# Patient Record
Sex: MALE | Race: WHITE | URBAN - METROPOLITAN AREA
[De-identification: names, ages, dates, MRNs, and addresses within clinical notes are randomized per-mention and may not be internally consistent; named-entity substitution may affect disease eponyms.]

---

## 2023-01-09 ENCOUNTER — OFFICE VISIT (OUTPATIENT)
Dept: FAMILY MEDICINE CLINIC | Age: 63
End: 2023-01-09
Payer: MEDICAID

## 2023-01-09 VITALS
SYSTOLIC BLOOD PRESSURE: 130 MMHG | RESPIRATION RATE: 17 BRPM | OXYGEN SATURATION: 97 % | HEART RATE: 62 BPM | HEIGHT: 72 IN | BODY MASS INDEX: 41.66 KG/M2 | DIASTOLIC BLOOD PRESSURE: 78 MMHG | WEIGHT: 307.6 LBS | TEMPERATURE: 98.2 F

## 2023-01-09 DIAGNOSIS — I48.0 PAF (PAROXYSMAL ATRIAL FIBRILLATION) (HCC): ICD-10-CM

## 2023-01-09 DIAGNOSIS — F51.01 PRIMARY INSOMNIA: ICD-10-CM

## 2023-01-09 DIAGNOSIS — Z76.89 ENCOUNTER TO ESTABLISH CARE: Primary | ICD-10-CM

## 2023-01-09 DIAGNOSIS — L97.509 TYPE 2 DIABETES MELLITUS WITH FOOT ULCER, WITHOUT LONG-TERM CURRENT USE OF INSULIN (HCC): ICD-10-CM

## 2023-01-09 DIAGNOSIS — I10 PRIMARY HYPERTENSION: ICD-10-CM

## 2023-01-09 DIAGNOSIS — E78.2 MIXED HYPERLIPIDEMIA: ICD-10-CM

## 2023-01-09 DIAGNOSIS — R26.9 ABNORMALITY OF GAIT AND MOBILITY: ICD-10-CM

## 2023-01-09 DIAGNOSIS — G62.9 NEUROPATHY: ICD-10-CM

## 2023-01-09 DIAGNOSIS — I50.33 ACUTE ON CHRONIC DIASTOLIC CONGESTIVE HEART FAILURE (HCC): ICD-10-CM

## 2023-01-09 DIAGNOSIS — E11.621 TYPE 2 DIABETES MELLITUS WITH FOOT ULCER, WITHOUT LONG-TERM CURRENT USE OF INSULIN (HCC): ICD-10-CM

## 2023-01-09 DIAGNOSIS — Z12.5 PROSTATE CANCER SCREENING: ICD-10-CM

## 2023-01-09 DIAGNOSIS — Z11.59 ENCOUNTER FOR HEPATITIS C SCREENING TEST FOR LOW RISK PATIENT: ICD-10-CM

## 2023-01-09 DIAGNOSIS — F10.10 ALCOHOL ABUSE: ICD-10-CM

## 2023-01-09 LAB — HBA1C MFR BLD HPLC: 7.5 %

## 2023-01-09 PROCEDURE — 99205 OFFICE O/P NEW HI 60 MIN: CPT

## 2023-01-09 PROCEDURE — 36415 COLL VENOUS BLD VENIPUNCTURE: CPT

## 2023-01-09 PROCEDURE — 3051F HG A1C>EQUAL 7.0%<8.0%: CPT

## 2023-01-09 PROCEDURE — 3078F DIAST BP <80 MM HG: CPT

## 2023-01-09 PROCEDURE — 83036 HEMOGLOBIN GLYCOSYLATED A1C: CPT

## 2023-01-09 PROCEDURE — 3075F SYST BP GE 130 - 139MM HG: CPT

## 2023-01-09 RX ORDER — PANTOPRAZOLE SODIUM 40 MG/1
40 TABLET, DELAYED RELEASE ORAL DAILY
COMMUNITY

## 2023-01-09 RX ORDER — ATORVASTATIN CALCIUM 40 MG/1
40 TABLET, FILM COATED ORAL DAILY
Qty: 90 TABLET | Refills: 0 | Status: SHIPPED | OUTPATIENT
Start: 2023-01-09

## 2023-01-09 RX ORDER — GABAPENTIN 300 MG/1
300 CAPSULE ORAL 3 TIMES DAILY
COMMUNITY
End: 2023-01-09 | Stop reason: SDUPTHER

## 2023-01-09 RX ORDER — GUAIFENESIN 600 MG/1
600 TABLET, EXTENDED RELEASE ORAL 2 TIMES DAILY
COMMUNITY

## 2023-01-09 RX ORDER — GABAPENTIN 300 MG/1
600 CAPSULE ORAL 3 TIMES DAILY
Qty: 540 CAPSULE | Refills: 0 | Status: SHIPPED | OUTPATIENT
Start: 2023-01-09

## 2023-01-09 RX ORDER — AMIODARONE HYDROCHLORIDE 200 MG/1
TABLET ORAL
COMMUNITY
End: 2023-01-09 | Stop reason: SDUPTHER

## 2023-01-09 RX ORDER — LORATADINE 10 MG/1
10 TABLET ORAL
COMMUNITY

## 2023-01-09 RX ORDER — LANOLIN ALCOHOL/MO/W.PET/CERES
CREAM (GRAM) TOPICAL DAILY
COMMUNITY

## 2023-01-09 RX ORDER — FLUTICASONE FUROATE AND VILANTEROL 200; 25 UG/1; UG/1
1 POWDER RESPIRATORY (INHALATION) DAILY
COMMUNITY

## 2023-01-09 RX ORDER — BENZONATATE 100 MG/1
100 CAPSULE ORAL
COMMUNITY

## 2023-01-09 RX ORDER — FOLIC ACID 1 MG/1
1 TABLET ORAL DAILY
Qty: 90 TABLET | Refills: 0 | Status: SHIPPED | OUTPATIENT
Start: 2023-01-09

## 2023-01-09 RX ORDER — FUROSEMIDE 40 MG/1
TABLET ORAL DAILY
COMMUNITY
End: 2023-01-09 | Stop reason: SDUPTHER

## 2023-01-09 RX ORDER — FOLIC ACID 1 MG/1
TABLET ORAL DAILY
COMMUNITY
End: 2023-01-09 | Stop reason: SDUPTHER

## 2023-01-09 RX ORDER — FUROSEMIDE 40 MG/1
40 TABLET ORAL DAILY
Qty: 90 TABLET | Refills: 0 | Status: SHIPPED | OUTPATIENT
Start: 2023-01-09

## 2023-01-09 RX ORDER — TRAZODONE HYDROCHLORIDE 50 MG/1
50 TABLET ORAL
Qty: 90 TABLET | Refills: 0 | Status: SHIPPED | OUTPATIENT
Start: 2023-01-09

## 2023-01-09 RX ORDER — LANOLIN ALCOHOL/MO/W.PET/CERES
400 CREAM (GRAM) TOPICAL DAILY
COMMUNITY

## 2023-01-09 RX ORDER — SACUBITRIL AND VALSARTAN 24; 26 MG/1; MG/1
1 TABLET, FILM COATED ORAL 2 TIMES DAILY
COMMUNITY

## 2023-01-09 RX ORDER — ATORVASTATIN CALCIUM 40 MG/1
TABLET, FILM COATED ORAL DAILY
COMMUNITY
End: 2023-01-09 | Stop reason: SDUPTHER

## 2023-01-09 RX ORDER — AMIODARONE HYDROCHLORIDE 200 MG/1
200 TABLET ORAL DAILY
Qty: 90 TABLET | Refills: 0 | Status: SHIPPED | OUTPATIENT
Start: 2023-01-09

## 2023-01-09 NOTE — LETTER
1/9/2023 4:57 PM    Mr. Sylvia Jones [address] [place] [address]      RE:  Nicky Barron   1960      Dear Mandy Short is a patient of [facility] 50 with Minh Kumar NP. Please fax this patient's most recent H&P, labs, discharge summary, consult notes so that we may update the patient's records for continuity of care. Our office number is [phone] if you should have any additional questions.      Our fax number: [phone]                    Sincerely,      Diana Green NP

## 2023-01-09 NOTE — PROGRESS NOTES
1. \"Have you been to the ER, urgent care clinic since your last visit? Hospitalized since your last visit? \" Yes Where: [facility]    2. \"Have you seen or consulted any other health care providers outside of the [address] [place] [address] since your last visit? \" Yes Where: [facility]      3. For patients aged 39-70: Has the patient had a colonoscopy / FIT/ Cologuard? No      If the patient is female:    4. For patients aged 41-77: Has the patient had a mammogram within the past 2 years? No      5. For patients aged 21-65: Has the patient had a pap smear?  NA - based on age or sex

## 2023-01-09 NOTE — PROGRESS NOTES
Chief Complaint   Patient presents with    [facility]       HPI:     is a 57 y.o. male who presents to establish care. He moved to this area to live with his son following hospitalization at [facility] in [place], and then later [address] [place] [address] in [place]. PAF:  Rate controlled on amiodarone, on Eliquis; denies palpitations. HF:  Acute on chronic diastolic HF; TTE 81/3237 with EF 55-60% and severe aortic stenosis. Is on 2 liter fluid restriction, daily weights and monitoring per [address] [place] [address] cardiology. Has appt in 2 weeks for cardiac cath; he was told that he may need TAVR. HLD:  Compliant with atorvastatin; per records, recent LDL 36. History of bilateral ICA stenosis S/P left endarterectomy. Hyperglycemia:  Requiring insulin during recent admission, but no diagnosis of DM. A1C 5.3%. GERD:  EGD 11/2022 showing non-bleeding gastric ulcers; remains on daily pantoprazole. Neuropathy issues:  Longtime difficulty with ambulation due to idiopathic neuropathy; seems worse over the last several months. Legs are very painful, especially at night; has some difficulty falling asleep which is exacerbated by this pain. Has been taking extra of his gabapentin to help with pain at bedtime. No Known Allergies    Current Outpatient Medications   Medication Sig    benzonatate (TESSALON) 100 mg capsule Take 100 mg by mouth three (3) times daily as needed for Cough. fluticasone furoate-vilanteroL (BREO ELLIPTA) 200-25 mcg/dose inhaler Take 1 Puff by inhalation daily. guaiFENesin ER (Mucinex) 600 mg ER tablet Take 600 mg by mouth two (2) times a day. loratadine (CLARITIN) 10 mg tablet Take 10 mg by mouth.    magnesium oxide (MAG-OX) 400 mg tablet Take 400 mg by mouth daily. pantoprazole (PROTONIX) 40 mg tablet Take 40 mg by mouth daily. thiamine HCL (B-1) 100 mg tablet Take  by mouth daily. sacubitriL-valsartan (Entresto) 24-26 mg tablet Take 1 Tablet by mouth two (2) times a day. thiamine HCL (Vitamin B-1) 100 mg tablet Take  by mouth daily. gabapentin (NEURONTIN) 300 mg capsule Take 2 Capsules by mouth three (3) times daily. Max Daily Amount: 1,800 mg.    amiodarone (CORDARONE) 200 mg tablet Take 1 Tablet by mouth daily. apixaban (Eliquis) 5 mg tablet Take 1 Tablet by mouth two (2) times a day. folic acid (FOLVITE) 1 mg tablet Take 1 Tablet by mouth daily. atorvastatin (LIPITOR) 40 mg tablet Take 1 Tablet by mouth daily. furosemide (LASIX) 40 mg tablet Take 1 Tablet by mouth daily. traZODone (DESYREL) 50 mg tablet Take 1 Tablet by mouth nightly. No current facility-administered medications for this visit. Past Medical History:   Diagnosis Date    Diabetes ([facility] [address]     Hypertension        Family History   Problem Relation Age of Onset    Cancer Mother     Cancer Sister     Cancer Brother        Review of Systems   Constitutional: Negative. Negative for chills, fever and malaise/fatigue. HENT: Negative. Eyes: Negative. Respiratory: Negative. Negative for cough and shortness of breath. Cardiovascular: Negative. Negative for chest pain, palpitations and leg swelling. Gastrointestinal: Negative. Negative for abdominal pain, nausea and vomiting. Genitourinary: Negative. Musculoskeletal: Negative. Skin: Negative. Neurological:  Positive for sensory change. Negative for dizziness and headaches. Endo/Heme/Allergies: Negative. Psychiatric/Behavioral:  Negative for depression. The patient has insomnia. The patient is not nervous/anxious.        /78 (BP 1 Location: Right arm, BP Patient Position: Sitting, BP Cuff Size: Large adult)   Pulse 62   Temp 98.2 °F (36.8 °C) (Temporal)   Resp 17   Ht 6' (1.829 m)   Wt 307 lb 9.6 oz (139.5 kg)   SpO2 97%   BMI 41.72 kg/m²     Wt Readings from Last 3 Encounters:   01/09/23 307 lb 9.6 oz (139.5 kg)       3 most recent PHQ Screens 1/9/2023   Little interest or pleasure in doing things Not at all   Feeling down, depressed, irritable, or hopeless Not at all   Total Score PHQ 2 0       Physical Exam  Vitals and nursing note reviewed. Constitutional:       General: He is not in acute distress. Appearance: Normal appearance. He is morbidly obese. HENT:      Head: Normocephalic and atraumatic. Mouth/Throat:      Mouth: Mucous membranes are moist.      Pharynx: Oropharynx is clear. Eyes:      Extraocular Movements: Extraocular movements intact. Conjunctiva/sclera: Conjunctivae normal.      Pupils: Pupils are equal, round, and reactive to light. Neck:      Vascular: No carotid bruit. Cardiovascular:      Rate and Rhythm: Normal rate and regular rhythm. Pulses: Normal pulses. Heart sounds: Murmur heard. Systolic murmur is present with a grade of 3/6. No friction rub. No gallop. Pulmonary:      Effort: Pulmonary effort is normal.      Breath sounds: Normal breath sounds. No wheezing, rhonchi or rales. Abdominal:      General: Bowel sounds are normal.      Palpations: Abdomen is soft. Musculoskeletal:         General: Normal range of motion. Cervical back: Normal range of motion and neck supple. Right lower le+ Pitting Edema present. Left lower le+ Pitting Edema present. Lymphadenopathy:      Cervical: No cervical adenopathy. Skin:     General: Skin is warm and dry. Comments: Pretibial stasis dermatitis of BLE   Neurological:      General: No focal deficit present. Mental Status: He is alert and oriented to person, place, and time. Psychiatric:         Mood and Affect: Mood normal.         Behavior: Behavior normal.         Thought Content: Thought content normal.         Judgment: Judgment normal.       ASSESSMENT AND PLAN:       ICD-10-CM ICD-9-CM    1. Encounter to establish care  Z76.89 V65.8       2.  Primary hypertension  I10 401.9 [place] COLLECTION VENOUS BLOOD VENIPUNCTURE      CBC WITH AUTOMATED DIFF      METABOLIC PANEL, COMPREHENSIVE      TSH 3RD GENERATION      furosemide (LASIX) 40 mg tablet      TSH 3RD GENERATION      METABOLIC PANEL, COMPREHENSIVE      CBC WITH AUTOMATED DIFF      3. Mixed hyperlipidemia  E78.2 272.2 W[place] COLLECTION VENOUS BLOOD VENIPUNCTURE      LIPID PANEL      atorvastatin (LIPITOR) 40 mg tablet      LIPID PANEL      4. Acute on chronic diastolic congestive heart failure (HCC)  I50.33 428.33 W[facility] COLLECTION VENOUS BLOOD VENIPUNCTURE     428.0 furosemide (LASIX) 40 mg tablet      5. PAF (paroxysmal atrial fibrillation) (HCC)  I48.0 427.31 W[place] COLLECTION VENOUS BLOOD VENIPUNCTURE      CBC WITH AUTOMATED DIFF      METABOLIC PANEL, COMPREHENSIVE      amiodarone (CORDARONE) 200 mg tablet      apixaban (Eliquis) 5 mg tablet      METABOLIC PANEL, COMPREHENSIVE      CBC WITH AUTOMATED DIFF      6. Type 2 diabetes mellitus with foot ulcer, without long-term current use of insulin ([facility])  E11.621 250.80 W[place] COLLECTION VENOUS BLOOD VENIPUNCTURE    L97.509 707.15 AMB POC HEMOGLOBIN A1C      COMPLIANCE DRUG SCREEN/PRESCRIPTION MONITORING      gabapentin (NEURONTIN) 300 mg capsule      COMPLIANCE DRUG SCREEN/PRESCRIPTION MONITORING      HEMOGLOBIN A1C WITH EAG      7. Prostate cancer screening  Z12.5 V76.44 [facility] COLLECTION VENOUS BLOOD VENIPUNCTURE      PSA SCREENING (SCREENING)      PSA SCREENING (SCREENING)      8. Encounter for hepatitis C screening test for low risk patient  Z11.59 V73.89 W[facility] COLLECTION VENOUS BLOOD VENIPUNCTURE      HEPATITIS PANEL, ACUTE      HEPATITIS PANEL, ACUTE      9. Neuropathy  G62.9 355.9 COMPLIANCE DRUG SCREEN/PRESCRIPTION MONITORING      gabapentin (NEURONTIN) 300 mg capsule      COMPLIANCE DRUG SCREEN/PRESCRIPTION MONITORING      10. Alcohol abuse  P13.08 359.00 folic acid (FOLVITE) 1 mg tablet      11. Primary insomnia  F51.01 307.42 traZODone (DESYREL) 50 mg tablet      12.  Abnormality of gait and mobility  R26.9 781.2 REFERRAL TO PHYSICAL THERAPY          Unable to view records from recent admissions or recent cardiology appt; will attempt to obtain these records. Normotensive today; continue current regimen. Discussed use of gabapentin, must take as prescribed; increase dosage and add trazodone for sleep. Controlled substance agreement reviewed and signed by patient; compliance drug screen collected today. Medication Side Effects and Warnings were discussed with patient:  yes  Patient Labs were reviewed:  yes  Patient Past Records were reviewed:  yes      Patient aware of plan of care and verbalized understanding. Questions answered. RTC 4 weeks or sooner if needed. On this date 01/09/2023 I have spent 45 minutes reviewing previous notes, test results and face to face with the patient discussing the diagnosis and importance of compliance with the treatment plan as well as documenting on the day of the visit.     Linda Tellez, NP

## 2023-01-11 DIAGNOSIS — I10 PRIMARY HYPERTENSION: ICD-10-CM

## 2023-01-11 DIAGNOSIS — N18.32 STAGE 3B CHRONIC KIDNEY DISEASE (HCC): Primary | ICD-10-CM

## 2023-01-11 LAB
ALBUMIN SERPL-MCNC: 3.6 G/DL (ref 3.5–5)
ALBUMIN/GLOB SERPL: 0.9 (ref 1.1–2.2)
ALP SERPL-CCNC: 110 U/L (ref 45–117)
ALT SERPL-CCNC: 10 U/L (ref 12–78)
ANION GAP SERPL CALC-SCNC: 7 MMOL/L (ref 5–15)
AST SERPL-CCNC: 16 U/L (ref 15–37)
BASOPHILS # BLD: 0.1 K/UL (ref 0–0.1)
BASOPHILS NFR BLD: 1 % (ref 0–1)
BILIRUB SERPL-MCNC: 0.4 MG/DL (ref 0.2–1)
BUN SERPL-MCNC: 44 MG/DL (ref 6–20)
BUN/CREAT SERPL: 21 (ref 12–20)
CALCIUM SERPL-MCNC: 9.1 MG/DL (ref 8.5–10.1)
CHLORIDE SERPL-SCNC: 104 MMOL/L (ref 97–108)
CHOLEST SERPL-MCNC: 95 MG/DL
CO2 SERPL-SCNC: 27 MMOL/L (ref 21–32)
CREAT SERPL-MCNC: 2.08 MG/DL (ref 0.7–1.3)
DIFFERENTIAL METHOD BLD: ABNORMAL
EOSINOPHIL # BLD: 0.2 K/UL (ref 0–0.4)
EOSINOPHIL NFR BLD: 2 % (ref 0–7)
ERYTHROCYTE [DISTWIDTH] IN BLOOD BY AUTOMATED COUNT: 17.1 % (ref 11.5–14.5)
GLOBULIN SER CALC-MCNC: 4.2 G/DL (ref 2–4)
GLUCOSE SERPL-MCNC: 159 MG/DL (ref 65–100)
HAV IGM SER QL: NONREACTIVE
HBV CORE IGM SER QL: NONREACTIVE
HBV SURFACE AG SER QL: <0.1 INDEX
HBV SURFACE AG SER QL: NEGATIVE
HCT VFR BLD AUTO: 37 % (ref 36.6–50.3)
HCV AB SERPL QL IA: NONREACTIVE
HDLC SERPL-MCNC: 35 MG/DL
HDLC SERPL: 2.7 (ref 0–5)
HGB BLD-MCNC: 10.7 G/DL (ref 12.1–17)
IMM GRANULOCYTES # BLD AUTO: 0 K/UL (ref 0–0.04)
IMM GRANULOCYTES NFR BLD AUTO: 0 % (ref 0–0.5)
LDLC SERPL CALC-MCNC: 39.6 MG/DL (ref 0–100)
LYMPHOCYTES # BLD: 1.5 K/UL (ref 0.8–3.5)
LYMPHOCYTES NFR BLD: 19 % (ref 12–49)
MCH RBC QN AUTO: 29.6 PG (ref 26–34)
MCHC RBC AUTO-ENTMCNC: 28.9 G/DL (ref 30–36.5)
MCV RBC AUTO: 102.2 FL (ref 80–99)
MONOCYTES # BLD: 0.5 K/UL (ref 0–1)
MONOCYTES NFR BLD: 7 % (ref 5–13)
NEUTS SEG # BLD: 5.4 K/UL (ref 1.8–8)
NEUTS SEG NFR BLD: 71 % (ref 32–75)
NRBC # BLD: 0 K/UL (ref 0–0.01)
NRBC BLD-RTO: 0 PER 100 WBC
PLATELET # BLD AUTO: 202 K/UL (ref 150–400)
PMV BLD AUTO: 12.2 FL (ref 8.9–12.9)
POTASSIUM SERPL-SCNC: 4.2 MMOL/L (ref 3.5–5.1)
PROT SERPL-MCNC: 7.8 G/DL (ref 6.4–8.2)
PSA SERPL-MCNC: 0.2 NG/ML (ref 0.01–4)
RBC # BLD AUTO: 3.62 M/UL (ref 4.1–5.7)
RBC MORPH BLD: ABNORMAL
SODIUM SERPL-SCNC: 138 MMOL/L (ref 136–145)
SP1: NORMAL
SP2: NORMAL
SP3: NORMAL
TRIGL SERPL-MCNC: 102 MG/DL (ref ?–150)
TSH SERPL DL<=0.05 MIU/L-ACNC: 3.11 UIU/ML (ref 0.36–3.74)
VLDLC SERPL CALC-MCNC: 20.4 MG/DL
WBC # BLD AUTO: 7.7 K/UL (ref 4.1–11.1)

## 2023-01-11 NOTE — PROGRESS NOTES
Spoke to son and gave results.  He does want a referral to a Kidney specialist. His phone is [phone] to use for referral.

## 2023-01-11 NOTE — PROGRESS NOTES
Your kidney function is decreased; I am going to refer you to a kidney specialist for further assessment and to prevent further damage. Your glucose is also a little elevated; be sure to follow a diet low in concentrated sweets and we will continue to evaluate this. Your other labs are stable.

## 2023-01-14 LAB — DRUGS UR: NORMAL

## 2023-01-17 ENCOUNTER — TELEPHONE (OUTPATIENT)
Dept: FAMILY MEDICINE CLINIC | Age: 63
End: 2023-01-17

## 2023-01-17 NOTE — TELEPHONE ENCOUNTER
MANAV Zuluaga S to confirm if patient will have to have the lab redrawn. He stated, the specimen was not on site the day of the request for the add on. They did get in touch with client services regarding it. The specimen would be too old at this point, but would been [id number] [address] on the original add on date. P[facility]ha was informed, stated OK and thanks.

## 2023-02-02 ENCOUNTER — TRANSCRIBE ORDER (OUTPATIENT)
Dept: SCHEDULING | Age: 63
End: 2023-02-02

## 2023-02-02 DIAGNOSIS — N18.32 CHRONIC KIDNEY DISEASE, STAGE 3B (HCC): Primary | ICD-10-CM

## 2023-02-05 DIAGNOSIS — I10 PRIMARY HYPERTENSION: ICD-10-CM

## 2023-02-05 DIAGNOSIS — I50.33 ACUTE ON CHRONIC DIASTOLIC CONGESTIVE HEART FAILURE (HCC): ICD-10-CM

## 2023-02-06 RX ORDER — FUROSEMIDE 40 MG/1
TABLET ORAL
Qty: 90 TABLET | Refills: 0 | Status: SHIPPED | OUTPATIENT
Start: 2023-02-06

## 2023-02-17 RX ORDER — LANOLIN ALCOHOL/MO/W.PET/CERES
100 CREAM (GRAM) TOPICAL DAILY
Qty: 90 TABLET | Refills: 3 | Status: SHIPPED | OUTPATIENT
Start: 2023-02-17

## 2023-03-30 DIAGNOSIS — F51.01 PRIMARY INSOMNIA: ICD-10-CM

## 2023-03-31 RX ORDER — TRAZODONE HYDROCHLORIDE 50 MG/1
50 TABLET ORAL
Qty: 30 TABLET | Refills: 2 | Status: SHIPPED | OUTPATIENT
Start: 2023-03-31

## 2023-04-03 ENCOUNTER — TELEPHONE (OUTPATIENT)
Dept: FAMILY MEDICINE CLINIC | Age: 63
End: 2023-04-03

## 2023-04-03 NOTE — TELEPHONE ENCOUNTER
----- Message from Belem Huynh sent at 3/31/2023  4:58 PM EDT -----  Subject: Message to Provider    QUESTIONS  Information for Provider? Randall from [employer] calling on behalf of   patient regarding multiple prescriptions. They are sending a request for   these prescriptions to be filled and would like for the doctor to review   and fax them back. Their contact number is [phone]. Their fax number   is [phone]  ---------------------------------------------------------------------------  --------------  [address]  [phone]; OK to leave message on voicemail  ---------------------------------------------------------------------------  --------------  SCRIPT ANSWERS  Relationship to Patient?  Self

## 2023-04-04 RX ORDER — AMIODARONE HYDROCHLORIDE 200 MG/1
200 TABLET ORAL DAILY
Qty: 90 TABLET | Refills: 0 | Status: SHIPPED
Start: 2023-04-04

## 2023-04-04 RX ORDER — FOLIC ACID 1 MG/1
1 TABLET ORAL DAILY
Qty: 90 TABLET | Refills: 0 | Status: SHIPPED
Start: 2023-04-04

## 2023-04-04 RX ORDER — GABAPENTIN 300 MG/1
600 CAPSULE ORAL 3 TIMES DAILY
Qty: 540 CAPSULE | Refills: 0 | Status: SHIPPED
Start: 2023-04-04

## 2023-04-04 RX ORDER — ATORVASTATIN CALCIUM 40 MG/1
40 TABLET, FILM COATED ORAL DAILY
Qty: 90 TABLET | Refills: 0 | Status: SHIPPED
Start: 2023-04-04

## 2023-04-04 NOTE — TELEPHONE ENCOUNTER
ADELIA Williamson to confirm if trazodone refill 03/31/2023 had be picked up by pt, request from other pharmacy? Per [facility] pt has already picked up, so request to soon.

## 2023-04-04 NOTE — TELEPHONE ENCOUNTER
Form sent to P[facility]ha confirming pt has changed pharmacy & refills requested. Per Nellie Graves and thanks.

## 2023-04-22 DIAGNOSIS — N18.32 CHRONIC KIDNEY DISEASE, STAGE 3B (HCC): Primary | ICD-10-CM

## 2023-06-27 ENCOUNTER — TELEPHONE (OUTPATIENT)
Age: 63
End: 2023-06-27

## 2023-06-27 DIAGNOSIS — E11.621 TYPE 2 DIABETES MELLITUS WITH FOOT ULCER (CODE) (HCC): ICD-10-CM

## 2023-06-27 DIAGNOSIS — G62.9 POLYNEUROPATHY, UNSPECIFIED: ICD-10-CM

## 2023-06-27 DIAGNOSIS — E78.2 MIXED HYPERLIPIDEMIA: ICD-10-CM

## 2023-06-27 DIAGNOSIS — F10.10 ALCOHOL ABUSE, UNCOMPLICATED: ICD-10-CM

## 2023-06-27 RX ORDER — GABAPENTIN 300 MG/1
CAPSULE ORAL
Qty: 540 CAPSULE | Refills: 0 | OUTPATIENT
Start: 2023-06-27

## 2023-06-27 RX ORDER — FOLIC ACID 1 MG/1
TABLET ORAL
Qty: 90 TABLET | Refills: 0 | OUTPATIENT
Start: 2023-06-27

## 2023-06-27 RX ORDER — ATORVASTATIN CALCIUM 40 MG/1
TABLET, FILM COATED ORAL
Qty: 90 TABLET | Refills: 0 | OUTPATIENT
Start: 2023-06-27

## 2023-07-05 DIAGNOSIS — F51.01 PRIMARY INSOMNIA: ICD-10-CM

## 2023-07-05 RX ORDER — TRAZODONE HYDROCHLORIDE 50 MG/1
TABLET ORAL
Qty: 30 TABLET | Refills: 2 | OUTPATIENT
Start: 2023-07-05

## 2023-07-10 DIAGNOSIS — I48.0 PAROXYSMAL ATRIAL FIBRILLATION (HCC): ICD-10-CM

## 2023-07-10 RX ORDER — AMIODARONE HYDROCHLORIDE 200 MG/1
TABLET ORAL
Qty: 90 TABLET | Refills: 3 | Status: SHIPPED | OUTPATIENT
Start: 2023-07-10

## 2023-07-10 NOTE — TELEPHONE ENCOUNTER
Medication Refill Request    Craig Tammi Jessenia is requesting a refill of the following medication(s):   gabapentin (NEURONTIN) 300 MG capsule  Please send refill to:      [facility]/PHARMACY [address] Jayne Kumar, [address] [place] [address] [phone] - F [phone]

## 2023-07-10 NOTE — TELEPHONE ENCOUNTER
Patient requesting refill on     Requested Prescriptions     Pending Prescriptions Disp Refills    amiodarone (CORDARONE) 200 MG tablet [Pharmacy Med Name: amiodarone 200 mg tablet] 90 tablet 11     Sig: TAKE ONE TABLET BY MOUTH DAILY AT 9AM         Last OV 1/9/23

## 2023-07-11 RX ORDER — GABAPENTIN 300 MG/1
600 CAPSULE ORAL 3 TIMES DAILY
Qty: 90 CAPSULE | Refills: 3 | OUTPATIENT
Start: 2023-07-11 | End: 2023-10-09

## 2023-07-18 ENCOUNTER — OFFICE VISIT (OUTPATIENT)
Age: 63
End: 2023-07-18
Payer: MEDICAID

## 2023-07-18 VITALS
SYSTOLIC BLOOD PRESSURE: 118 MMHG | HEART RATE: 68 BPM | HEIGHT: 72 IN | BODY MASS INDEX: 38.28 KG/M2 | WEIGHT: 282.6 LBS | OXYGEN SATURATION: 97 % | DIASTOLIC BLOOD PRESSURE: 68 MMHG | RESPIRATION RATE: 16 BRPM

## 2023-07-18 DIAGNOSIS — E66.01 CLASS 2 SEVERE OBESITY DUE TO EXCESS CALORIES WITH SERIOUS COMORBIDITY AND BODY MASS INDEX (BMI) OF 38.0 TO 38.9 IN ADULT (HCC): ICD-10-CM

## 2023-07-18 DIAGNOSIS — G62.9 POLYNEUROPATHY, UNSPECIFIED: ICD-10-CM

## 2023-07-18 DIAGNOSIS — I50.32 CHRONIC HEART FAILURE WITH PRESERVED EJECTION FRACTION (HCC): ICD-10-CM

## 2023-07-18 DIAGNOSIS — I35.0 SEVERE AORTIC STENOSIS: ICD-10-CM

## 2023-07-18 DIAGNOSIS — N18.32 CHRONIC KIDNEY DISEASE, STAGE 3B (HCC): ICD-10-CM

## 2023-07-18 DIAGNOSIS — Z12.11 COLON CANCER SCREENING: ICD-10-CM

## 2023-07-18 DIAGNOSIS — E11.621 TYPE 2 DIABETES MELLITUS WITH FOOT ULCER (CODE) (HCC): Primary | ICD-10-CM

## 2023-07-18 DIAGNOSIS — I10 ESSENTIAL (PRIMARY) HYPERTENSION: ICD-10-CM

## 2023-07-18 LAB — HBA1C MFR BLD: 6.3 %

## 2023-07-18 PROCEDURE — 3078F DIAST BP <80 MM HG: CPT

## 2023-07-18 PROCEDURE — 99214 OFFICE O/P EST MOD 30 MIN: CPT

## 2023-07-18 PROCEDURE — 1123F ACP DISCUSS/DSCN MKR DOCD: CPT

## 2023-07-18 PROCEDURE — 36415 COLL VENOUS BLD VENIPUNCTURE: CPT

## 2023-07-18 PROCEDURE — 83036 HEMOGLOBIN GLYCOSYLATED A1C: CPT

## 2023-07-18 PROCEDURE — 3074F SYST BP LT 130 MM HG: CPT

## 2023-07-18 RX ORDER — GABAPENTIN 300 MG/1
600 CAPSULE ORAL 3 TIMES DAILY
Qty: 540 CAPSULE | Refills: 1 | Status: SHIPPED | OUTPATIENT
Start: 2023-07-18 | End: 2024-01-14

## 2023-07-18 SDOH — ECONOMIC STABILITY: INCOME INSECURITY: IN THE LAST 12 MONTHS, WAS THERE A TIME WHEN YOU WERE NOT ABLE TO PAY THE MORTGAGE OR RENT ON TIME?: NO

## 2023-07-18 SDOH — ECONOMIC STABILITY: HOUSING INSECURITY
IN THE LAST 12 MONTHS, WAS THERE A TIME WHEN YOU DID NOT HAVE A STEADY PLACE TO SLEEP OR SLEPT IN A SHELTER (INCLUDING NOW)?: NO

## 2023-07-18 SDOH — ECONOMIC STABILITY: TRANSPORTATION INSECURITY
IN THE PAST 12 MONTHS, HAS LACK OF TRANSPORTATION KEPT YOU FROM MEETINGS, WORK, OR FROM GETTING THINGS NEEDED FOR DAILY LIVING?: NO

## 2023-07-18 SDOH — ECONOMIC STABILITY: TRANSPORTATION INSECURITY
IN THE PAST 12 MONTHS, HAS THE LACK OF TRANSPORTATION KEPT YOU FROM MEDICAL APPOINTMENTS OR FROM GETTING MEDICATIONS?: NO

## 2023-07-18 SDOH — HEALTH STABILITY: PHYSICAL HEALTH: ON AVERAGE, HOW MANY DAYS PER WEEK DO YOU ENGAGE IN MODERATE TO STRENUOUS EXERCISE (LIKE A BRISK WALK)?: 7 DAYS

## 2023-07-18 SDOH — HEALTH STABILITY: PHYSICAL HEALTH: ON AVERAGE, HOW MANY MINUTES DO YOU ENGAGE IN EXERCISE AT THIS LEVEL?: 120 MIN

## 2023-07-18 ASSESSMENT — ENCOUNTER SYMPTOMS
EYES NEGATIVE: 1
CONSTIPATION: 0
RESPIRATORY NEGATIVE: 1
WHEEZING: 0
ALLERGIC/IMMUNOLOGIC NEGATIVE: 1
DIARRHEA: 0
BLOOD IN STOOL: 0
SHORTNESS OF BREATH: 0
COUGH: 0

## 2023-07-18 ASSESSMENT — PATIENT HEALTH QUESTIONNAIRE - PHQ9
SUM OF ALL RESPONSES TO PHQ9 QUESTIONS 1 & 2: 0
SUM OF ALL RESPONSES TO PHQ QUESTIONS 1-9: 0
SUM OF ALL RESPONSES TO PHQ QUESTIONS 1-9: 0
2. FEELING DOWN, DEPRESSED OR HOPELESS: 0
SUM OF ALL RESPONSES TO PHQ QUESTIONS 1-9: 0
1. LITTLE INTEREST OR PLEASURE IN DOING THINGS: 0
SUM OF ALL RESPONSES TO PHQ QUESTIONS 1-9: 0

## 2023-07-18 ASSESSMENT — LIFESTYLE VARIABLES
HOW OFTEN DO YOU HAVE A DRINK CONTAINING ALCOHOL: NEVER
HOW MANY STANDARD DRINKS CONTAINING ALCOHOL DO YOU HAVE ON A TYPICAL DAY: PATIENT DOES NOT DRINK

## 2023-07-18 NOTE — PROGRESS NOTES
Chief Complaint   Patient presents with    Follow-up     gabapentin    Shortness of Breath     2-3 weeks comes and goes       HPI:     is a 70 y.o. male who presents for chronic follow-up. PAF:  Rate controlled on amiodarone, on Eliquis; denies palpitations. HF:  TTE 12/19/22:  Severe concentric left ventricular hypertrophy with normal cavity size and systolic function. LV ejection fraction = 55-60%. Thickened, calcified aortic valve with severe stenosis. Mitral annular calcification. The mitral valve leaflets are not well visualized. There is likely mild mitral stenosis and trivial mitral regurgitation. According to cardiology notes, TAVR is indicated but it is unclear as to why this has not yet been scheduled; Mr. Shameka Zhong has f/u scheduled with Dr. Enrique Pickard next month. HLD:  Compliant with atorvastatin; LDL 39. History of bilateral ICA stenosis S/P left endarterectomy. Hyperglycemia:  A1C 7.5%; weight is down 25 pounds in the past 6 months. GERD:  EGD 11/2022 showing non-bleeding gastric ulcers; remains on daily pantoprazole. Neuropathy issues:   Legs are very painful, especially at night; has some difficulty falling asleep which is exacerbated by this pain. Gabapentin is helping with this. Alcoholism:  Was a daily drinker until about 8 months ago, up to 1/5 liquor and 6-8 beers daily. No Known Allergies    Current Outpatient Medications   Medication Sig Dispense Refill    gabapentin (NEURONTIN) 300 MG capsule Take 2 capsules by mouth 3 times daily for 180 days.  Max Daily Amount: 1,800 mg 540 capsule 1    amiodarone (CORDARONE) 200 MG tablet TAKE ONE TABLET BY MOUTH DAILY AT 9AM 90 tablet 3    apixaban (ELIQUIS) 5 MG TABS tablet Take 1 tablet by mouth 2 times daily      atorvastatin (LIPITOR) 40 MG tablet Take 1 tablet by mouth daily      benzonatate (TESSALON) 100 MG capsule Take 1 capsule by mouth 3 times daily as needed      fluticasone furoate-vilanterol (BREO ELLIPTA) 200-25

## 2023-07-18 NOTE — PROGRESS NOTES
1. \"Have you been to the ER, urgent care clinic since your last visit? Hospitalized since your last visit? \" No    2. \"Have you seen or consulted any other health care providers outside of the [address] [place] [address] since your last visit? \" No     3. For patients aged 43-73: Has the patient had a colonoscopy / FIT/ Cologuard? No       If the patient is female:    4. For patients aged 43-66: Has the patient had a mammogram within the past 2 years? NA - based on age    11. For patients aged 21-65: Has the patient had a pap smear? NA - based on age    Chief Complaint   Patient presents with    Follow-up     gabapentin    Shortness of Breath     2-3 weeks comes and goes       Vitals:    07/18/23 1400   BP: 118/68   Pulse: 68   Resp: 16   SpO2: 97%     Labs drawn in left hand per Rayna's orders. Patient tolerated well.

## 2023-07-19 LAB
ANION GAP SERPL CALC-SCNC: 8 MMOL/L (ref 5–15)
BASOPHILS # BLD: 0.1 K/UL (ref 0–0.1)
BASOPHILS NFR BLD: 1 % (ref 0–1)
BUN SERPL-MCNC: 28 MG/DL (ref 6–20)
BUN/CREAT SERPL: 17 (ref 12–20)
CALCIUM SERPL-MCNC: 9.2 MG/DL (ref 8.5–10.1)
CHLORIDE SERPL-SCNC: 100 MMOL/L (ref 97–108)
CO2 SERPL-SCNC: 28 MMOL/L (ref 21–32)
COMMENT:: NORMAL
CREAT SERPL-MCNC: 1.64 MG/DL (ref 0.7–1.3)
CREAT UR-MCNC: 66.1 MG/DL
DIFFERENTIAL METHOD BLD: ABNORMAL
EOSINOPHIL # BLD: 0.1 K/UL (ref 0–0.4)
EOSINOPHIL NFR BLD: 1 % (ref 0–7)
ERYTHROCYTE [DISTWIDTH] IN BLOOD BY AUTOMATED COUNT: 15.1 % (ref 11.5–14.5)
GLUCOSE SERPL-MCNC: 109 MG/DL (ref 65–100)
HCT VFR BLD AUTO: 44.2 % (ref 36.6–50.3)
HGB BLD-MCNC: 13.9 G/DL (ref 12.1–17)
IMM GRANULOCYTES # BLD AUTO: 0 K/UL (ref 0–0.04)
IMM GRANULOCYTES NFR BLD AUTO: 0 % (ref 0–0.5)
LYMPHOCYTES # BLD: 1.6 K/UL (ref 0.8–3.5)
LYMPHOCYTES NFR BLD: 19 % (ref 12–49)
MCH RBC QN AUTO: 30 PG (ref 26–34)
MCHC RBC AUTO-ENTMCNC: 31.4 G/DL (ref 30–36.5)
MCV RBC AUTO: 95.5 FL (ref 80–99)
MICROALBUMIN UR-MCNC: 4.28 MG/DL
MICROALBUMIN/CREAT UR-RTO: 65 MG/G (ref 0–30)
MONOCYTES # BLD: 0.6 K/UL (ref 0–1)
MONOCYTES NFR BLD: 6 % (ref 5–13)
NEUTS SEG # BLD: 6.3 K/UL (ref 1.8–8)
NEUTS SEG NFR BLD: 73 % (ref 32–75)
NRBC # BLD: 0 K/UL (ref 0–0.01)
NRBC BLD-RTO: 0 PER 100 WBC
PLATELET # BLD AUTO: 165 K/UL (ref 150–400)
PMV BLD AUTO: 11.1 FL (ref 8.9–12.9)
POTASSIUM SERPL-SCNC: 3.7 MMOL/L (ref 3.5–5.1)
RBC # BLD AUTO: 4.63 M/UL (ref 4.1–5.7)
SODIUM SERPL-SCNC: 136 MMOL/L (ref 136–145)
SPECIMEN HOLD: NORMAL
WBC # BLD AUTO: 8.7 K/UL (ref 4.1–11.1)

## 2023-07-28 DIAGNOSIS — F51.01 PRIMARY INSOMNIA: ICD-10-CM

## 2023-07-28 DIAGNOSIS — F10.10 ALCOHOL ABUSE, UNCOMPLICATED: ICD-10-CM

## 2023-07-28 DIAGNOSIS — G62.9 POLYNEUROPATHY, UNSPECIFIED: ICD-10-CM

## 2023-07-28 DIAGNOSIS — E78.2 MIXED HYPERLIPIDEMIA: ICD-10-CM

## 2023-07-28 RX ORDER — TRAZODONE HYDROCHLORIDE 50 MG/1
TABLET ORAL
Qty: 90 TABLET | Refills: 1 | Status: SHIPPED | OUTPATIENT
Start: 2023-07-28

## 2023-07-28 RX ORDER — FOLIC ACID 1 MG/1
TABLET ORAL
Qty: 90 TABLET | Refills: 1 | Status: SHIPPED | OUTPATIENT
Start: 2023-07-28

## 2023-07-28 RX ORDER — ATORVASTATIN CALCIUM 40 MG/1
TABLET, FILM COATED ORAL
Qty: 90 TABLET | Refills: 1 | Status: SHIPPED | OUTPATIENT
Start: 2023-07-28

## 2023-07-28 RX ORDER — GABAPENTIN 300 MG/1
CAPSULE ORAL
Qty: 540 CAPSULE | Refills: 0 | OUTPATIENT
Start: 2023-07-28

## 2023-08-21 ENCOUNTER — HOSPITAL ENCOUNTER (EMERGENCY)
Facility: HOSPITAL | Age: 63
Discharge: ANOTHER HOSPITAL | End: 2023-08-21
Attending: EMERGENCY MEDICINE
Payer: MEDICARE

## 2023-08-21 ENCOUNTER — APPOINTMENT (OUTPATIENT)
Facility: HOSPITAL | Age: 63
End: 2023-08-21
Payer: MEDICARE

## 2023-08-21 VITALS
RESPIRATION RATE: 14 BRPM | SYSTOLIC BLOOD PRESSURE: 156 MMHG | HEIGHT: 72 IN | OXYGEN SATURATION: 96 % | TEMPERATURE: 98 F | HEART RATE: 65 BPM | DIASTOLIC BLOOD PRESSURE: 69 MMHG | WEIGHT: 280 LBS | BODY MASS INDEX: 37.93 KG/M2

## 2023-08-21 DIAGNOSIS — R07.9 CHEST PAIN, UNSPECIFIED TYPE: Primary | ICD-10-CM

## 2023-08-21 LAB
ALBUMIN SERPL-MCNC: 4 G/DL (ref 3.5–5)
ALBUMIN/GLOB SERPL: 1.2 (ref 1.1–2.2)
ALP SERPL-CCNC: 89 U/L (ref 45–117)
ALT SERPL-CCNC: 16 U/L (ref 12–78)
ANION GAP SERPL CALC-SCNC: 9 MMOL/L (ref 5–15)
AST SERPL-CCNC: 19 U/L (ref 15–37)
BASOPHILS # BLD: 0 K/UL (ref 0–0.1)
BASOPHILS NFR BLD: 0 % (ref 0–1)
BILIRUB SERPL-MCNC: 1.1 MG/DL (ref 0.2–1)
BUN SERPL-MCNC: 26 MG/DL (ref 6–20)
BUN/CREAT SERPL: 15 (ref 12–20)
CALCIUM SERPL-MCNC: 9.1 MG/DL (ref 8.5–10.1)
CHLORIDE SERPL-SCNC: 102 MMOL/L (ref 97–108)
CO2 SERPL-SCNC: 29 MMOL/L (ref 21–32)
CREAT SERPL-MCNC: 1.76 MG/DL (ref 0.7–1.3)
D DIMER PPP FEU-MCNC: 0.42 MG/L FEU (ref 0–0.65)
DIFFERENTIAL METHOD BLD: ABNORMAL
EOSINOPHIL # BLD: 0.1 K/UL (ref 0–0.4)
EOSINOPHIL NFR BLD: 1 % (ref 0–7)
ERYTHROCYTE [DISTWIDTH] IN BLOOD BY AUTOMATED COUNT: 15.1 % (ref 11.5–14.5)
FLUAV RNA SPEC QL NAA+PROBE: NOT DETECTED
FLUBV RNA SPEC QL NAA+PROBE: NOT DETECTED
GLOBULIN SER CALC-MCNC: 3.4 G/DL (ref 2–4)
GLUCOSE SERPL-MCNC: 124 MG/DL (ref 65–100)
HCT VFR BLD AUTO: 40.6 % (ref 36.6–50.3)
HGB BLD-MCNC: 13.2 G/DL (ref 12.1–17)
IMM GRANULOCYTES # BLD AUTO: 0 K/UL (ref 0–0.04)
IMM GRANULOCYTES NFR BLD AUTO: 1 % (ref 0–0.5)
LIPASE SERPL-CCNC: 47 U/L (ref 73–393)
LYMPHOCYTES # BLD: 1.1 K/UL (ref 0.8–3.5)
LYMPHOCYTES NFR BLD: 14 % (ref 12–49)
MAGNESIUM SERPL-MCNC: 1.9 MG/DL (ref 1.6–2.4)
MCH RBC QN AUTO: 30.3 PG (ref 26–34)
MCHC RBC AUTO-ENTMCNC: 32.5 G/DL (ref 30–36.5)
MCV RBC AUTO: 93.3 FL (ref 80–99)
MONOCYTES # BLD: 0.4 K/UL (ref 0–1)
MONOCYTES NFR BLD: 5 % (ref 5–13)
NEUTS SEG # BLD: 6.3 K/UL (ref 1.8–8)
NEUTS SEG NFR BLD: 79 % (ref 32–75)
NRBC # BLD: 0 K/UL (ref 0–0.01)
NRBC BLD-RTO: 0 PER 100 WBC
PLATELET # BLD AUTO: 174 K/UL (ref 150–400)
PMV BLD AUTO: 10.5 FL (ref 8.9–12.9)
POTASSIUM SERPL-SCNC: 4.5 MMOL/L (ref 3.5–5.1)
PROT SERPL-MCNC: 7.4 G/DL (ref 6.4–8.2)
RBC # BLD AUTO: 4.35 M/UL (ref 4.1–5.7)
SARS-COV-2 RNA RESP QL NAA+PROBE: NOT DETECTED
SODIUM SERPL-SCNC: 140 MMOL/L (ref 136–145)
TROPONIN I SERPL HS-MCNC: 101 NG/L (ref 0–76)
TROPONIN I SERPL HS-MCNC: 95 NG/L (ref 0–76)
WBC # BLD AUTO: 7.9 K/UL (ref 4.1–11.1)

## 2023-08-21 PROCEDURE — 6360000004 HC RX CONTRAST MEDICATION: Performed by: EMERGENCY MEDICINE

## 2023-08-21 PROCEDURE — 80053 COMPREHEN METABOLIC PANEL: CPT

## 2023-08-21 PROCEDURE — 6360000002 HC RX W HCPCS: Performed by: EMERGENCY MEDICINE

## 2023-08-21 PROCEDURE — 99285 EMERGENCY DEPT VISIT HI MDM: CPT

## 2023-08-21 PROCEDURE — 85379 FIBRIN DEGRADATION QUANT: CPT

## 2023-08-21 PROCEDURE — 83735 ASSAY OF MAGNESIUM: CPT

## 2023-08-21 PROCEDURE — 96374 THER/PROPH/DIAG INJ IV PUSH: CPT

## 2023-08-21 PROCEDURE — 96372 THER/PROPH/DIAG INJ SC/IM: CPT

## 2023-08-21 PROCEDURE — 36415 COLL VENOUS BLD VENIPUNCTURE: CPT

## 2023-08-21 PROCEDURE — 6370000000 HC RX 637 (ALT 250 FOR IP): Performed by: EMERGENCY MEDICINE

## 2023-08-21 PROCEDURE — 84484 ASSAY OF TROPONIN QUANT: CPT

## 2023-08-21 PROCEDURE — 83690 ASSAY OF LIPASE: CPT

## 2023-08-21 PROCEDURE — 71046 X-RAY EXAM CHEST 2 VIEWS: CPT

## 2023-08-21 PROCEDURE — 85025 COMPLETE CBC W/AUTO DIFF WBC: CPT

## 2023-08-21 PROCEDURE — 87636 SARSCOV2 & INF A&B AMP PRB: CPT

## 2023-08-21 PROCEDURE — 93005 ELECTROCARDIOGRAM TRACING: CPT | Performed by: EMERGENCY MEDICINE

## 2023-08-21 PROCEDURE — 71275 CT ANGIOGRAPHY CHEST: CPT

## 2023-08-21 RX ORDER — LOSARTAN POTASSIUM 25 MG/1
50 TABLET ORAL
Status: COMPLETED | OUTPATIENT
Start: 2023-08-21 | End: 2023-08-21

## 2023-08-21 RX ORDER — GABAPENTIN 300 MG/1
600 CAPSULE ORAL
Status: COMPLETED | OUTPATIENT
Start: 2023-08-21 | End: 2023-08-21

## 2023-08-21 RX ORDER — AMIODARONE HYDROCHLORIDE 200 MG/1
400 TABLET ORAL DAILY
Status: DISCONTINUED | OUTPATIENT
Start: 2023-08-22 | End: 2023-08-22 | Stop reason: HOSPADM

## 2023-08-21 RX ORDER — GABAPENTIN 300 MG/1
300 CAPSULE ORAL 3 TIMES DAILY
Status: DISCONTINUED | OUTPATIENT
Start: 2023-08-21 | End: 2023-08-21

## 2023-08-21 RX ORDER — TRAZODONE HYDROCHLORIDE 50 MG/1
50 TABLET ORAL
Status: DISCONTINUED | OUTPATIENT
Start: 2023-08-21 | End: 2023-08-22 | Stop reason: HOSPADM

## 2023-08-21 RX ORDER — PANTOPRAZOLE SODIUM 40 MG/1
40 TABLET, DELAYED RELEASE ORAL
Status: DISCONTINUED | OUTPATIENT
Start: 2023-08-21 | End: 2023-08-22 | Stop reason: HOSPADM

## 2023-08-21 RX ORDER — GABAPENTIN 300 MG/1
600 CAPSULE ORAL 3 TIMES DAILY
Status: DISCONTINUED | OUTPATIENT
Start: 2023-08-21 | End: 2023-08-21

## 2023-08-21 RX ORDER — TRAZODONE HYDROCHLORIDE 50 MG/1
50 TABLET ORAL NIGHTLY
Status: DISCONTINUED | OUTPATIENT
Start: 2023-08-21 | End: 2023-08-21

## 2023-08-21 RX ORDER — LIDOCAINE HYDROCHLORIDE 20 MG/ML
15 SOLUTION OROPHARYNGEAL
Status: COMPLETED | OUTPATIENT
Start: 2023-08-21 | End: 2023-08-21

## 2023-08-21 RX ORDER — ENOXAPARIN SODIUM 150 MG/ML
1 INJECTION SUBCUTANEOUS
Status: COMPLETED | OUTPATIENT
Start: 2023-08-21 | End: 2023-08-21

## 2023-08-21 RX ORDER — GABAPENTIN 300 MG/1
300 CAPSULE ORAL ONCE
Status: COMPLETED | OUTPATIENT
Start: 2023-08-21 | End: 2023-08-21

## 2023-08-21 RX ORDER — FENTANYL CITRATE 0.05 MG/ML
50 INJECTION, SOLUTION INTRAMUSCULAR; INTRAVENOUS ONCE
Status: COMPLETED | OUTPATIENT
Start: 2023-08-21 | End: 2023-08-21

## 2023-08-21 RX ORDER — MAGNESIUM HYDROXIDE/ALUMINUM HYDROXICE/SIMETHICONE 120; 1200; 1200 MG/30ML; MG/30ML; MG/30ML
30 SUSPENSION ORAL EVERY 6 HOURS PRN
Status: DISCONTINUED | OUTPATIENT
Start: 2023-08-21 | End: 2023-08-22 | Stop reason: HOSPADM

## 2023-08-21 RX ADMIN — ENOXAPARIN SODIUM 120 MG: 150 INJECTION SUBCUTANEOUS at 15:32

## 2023-08-21 RX ADMIN — GABAPENTIN 300 MG: 300 CAPSULE ORAL at 19:44

## 2023-08-21 RX ADMIN — LOSARTAN POTASSIUM 50 MG: 25 TABLET, FILM COATED ORAL at 19:53

## 2023-08-21 RX ADMIN — LIDOCAINE HYDROCHLORIDE 15 ML: 20 SOLUTION ORAL; TOPICAL at 09:23

## 2023-08-21 RX ADMIN — GABAPENTIN 300 MG: 300 CAPSULE ORAL at 19:25

## 2023-08-21 RX ADMIN — ALUMINUM HYDROXIDE, MAGNESIUM HYDROXIDE, AND SIMETHICONE 30 ML: 200; 200; 20 SUSPENSION ORAL at 09:23

## 2023-08-21 RX ADMIN — IOPAMIDOL 100 ML: 755 INJECTION, SOLUTION INTRAVENOUS at 11:33

## 2023-08-21 RX ADMIN — FENTANYL CITRATE 50 MCG: 0.05 INJECTION, SOLUTION INTRAMUSCULAR; INTRAVENOUS at 09:23

## 2023-08-21 ASSESSMENT — PAIN DESCRIPTION - LOCATION
LOCATION: CHEST
LOCATION: CHEST

## 2023-08-21 ASSESSMENT — PAIN DESCRIPTION - DESCRIPTORS: DESCRIPTORS: ACHING

## 2023-08-21 ASSESSMENT — PAIN - FUNCTIONAL ASSESSMENT: PAIN_FUNCTIONAL_ASSESSMENT: 0-10

## 2023-08-21 ASSESSMENT — PAIN SCALES - GENERAL
PAINLEVEL_OUTOF10: 7
PAINLEVEL_OUTOF10: 7

## 2023-08-21 ASSESSMENT — PAIN DESCRIPTION - PAIN TYPE: TYPE: ACUTE PAIN

## 2023-08-21 NOTE — ED NOTES
Patient educated on the medication(s) he/she is going to receive, allergies reviewed/verified and patient medicated as ordered. Side rails up and call light within reach. Will continue to monitor.   Pt to xray at this time     Ernst Fish RN  08/21/23 0817

## 2023-08-21 NOTE — ED PROVIDER NOTES
disease. Normal aorta. XR CHEST (2 VW)   Final Result   No acute cardiopulmonary abnormalities. Medical Decision Making       I reviewed the vital signs, available nursing notes, past medical history, past surgical history, family history and social history. Vital Signs-Reviewed the patient's vital signs. No data found. EKG interpretation: (Preliminary)  Rhythm: Sinus rhythm with first-degree block. Rate (approx.): 76; Axis: normal; P wave: normal; QRS interval: normal ; ST/T wave: normal;  was interpreted by Jan Wynn MD,ED Provider. Provider Notes (Medical Decision Making): On presentation the patient is well appearing, in no acute distress with reassurnig vital signs. Based on the history and exam the differential diagnosis for this patient includes  STEMI, NSTEMI, Angina, PE, Aortic Pathology, Chest Wall Pain, Pleurisy, Pneumonia, GERD/esophagitis, Anxiety. Work-up in the ED was pertinent for negative troponin x2, EKG without any acute ischemic changes, CT angiogram with no acute findings on my preliminary interpretation. Patient's work-up thus far has been reassuring he does have a concerning history for unstable angina, additionally has critical aortic stenosis and reports multiple episodes over the last 2 days of previous syncope so feel the patient will be admitted, for continuity of care and surgical planning feel this is best done at [facility]. Patient does agree with this and is requesting transfer to [facility]. ED Course:   Initial assessment performed. The patients presenting problems have been discussed, and they are in agreement with the care plan formulated and outlined with them. I have encouraged them to ask questions as they arise throughout their visit.     Patient was given the following medications:  Medications   fentaNYL (SUBLIMAZE) injection 50 mcg (50 mcg IntraVENous Given 8/21/23 0923)   lidocaine viscous hcl (XYLOCAINE) 2 % solution 15 mL (15 mLs Mouth/Throat Given 8/21/23 0923)   iopamidol (ISOVUE-370) 76 % injection 100 mL (100 mLs IntraVENous Given 8/21/23 1133)   enoxaparin (LOVENOX) injection 120 mg (120 mg SubCUTAneous Given 8/21/23 1532)   gabapentin (NEURONTIN) capsule 300 mg (300 mg Oral Given 8/21/23 1925)   gabapentin (NEURONTIN) capsule 600 mg (300 mg Oral Given 8/21/23 1944)   losartan (COZAAR) tablet 50 mg (50 mg Oral Given 8/21/23 1953)       ED Course as of 08/26/23 1447   Mon Aug 21, 2023   0915 Pt signed out to me by  Dr. Loida Jenkins. [facility] [address] [place] [address]. [VG]      ED Course User Index  [VG] Vivian Sainz MD             PROGRESS  Job Jessenia's  results have been reviewed with him. He has been counseled regarding his diagnosis. Critical Care Note      NOTES   :  I have provided a total of 35 minutes of critical time not including time spent on separately documented procedures. The reason for providing this level of medical care for this critically ill patient was due to a critical illness that impaired one or more vital organ systems such that there was a high probability of imminent or life threatening deterioration in the patients condition. This care involved high complexity decision making to assess, manipulate, and support vital system functions,  lab review, consultations with specialist, family decision- making, bedside attention and documentation. During this entire length of time I was immediately available to the patient    Disposition:  transfer    PLAN:  1. transfer      Diagnosis     Clinical Impression:   1. Chest pain, unspecified type          I, Delgado Barahona MD am the first provider for this patient and am the attending of record for this patient encounter. Delgado Barahona MD        Please note that this dictation was completed with Dragon, computer voice recognition software.   Quite often unanticipated grammatical, syntax, homophones, and other interpretive errors are

## 2023-08-21 NOTE — ED NOTES
Pt accepted to [address] [place] [address]. ([phone]). NS made aware to make ACLS transport.       Roberto Coaets RN  08/21/23 2020

## 2023-08-21 NOTE — ED TRIAGE NOTES
Pt arrived by POV for chest pain. Per EMS pt complains of chest pain with radiation down right arm and into shoulder blades and nausea for 3 days. Pt reports increased pain with coughing and deep breath. Pt was given  mg and Nitro X 2 with pain from 9/10 down to 6/10. Pt arrived awake alert and oriented X4,  pt educated on ER fl ow.   No IV established or blood sugar obtained

## 2023-08-22 NOTE — CARE COORDINATION
TRANSPORTATION AUTH FOR SERVICE DATE: 8/21/23      PATIENT TRANSFERRED TO HIGHER LEVEL OF CARE VIA [facility]. PRIOR AUTH DOES NOT HAVE TO BE OBTAINED.

## 2023-08-23 LAB
EKG ATRIAL RATE: 76 BPM
EKG DIAGNOSIS: NORMAL
EKG P AXIS: -21 DEGREES
EKG P-R INTERVAL: 216 MS
EKG Q-T INTERVAL: 422 MS
EKG QRS DURATION: 108 MS
EKG QTC CALCULATION (BAZETT): 474 MS
EKG R AXIS: -31 DEGREES
EKG T AXIS: 45 DEGREES
EKG VENTRICULAR RATE: 76 BPM

## 2023-08-26 DIAGNOSIS — G62.9 POLYNEUROPATHY, UNSPECIFIED: ICD-10-CM

## 2023-08-28 RX ORDER — GABAPENTIN 300 MG/1
600 CAPSULE ORAL 3 TIMES DAILY
Qty: 540 CAPSULE | Refills: 0 | Status: SHIPPED | OUTPATIENT
Start: 2023-09-17 | End: 2023-12-16

## 2023-08-30 ENCOUNTER — APPOINTMENT (OUTPATIENT)
Facility: HOSPITAL | Age: 63
End: 2023-08-30
Payer: MEDICARE

## 2023-08-30 ENCOUNTER — HOSPITAL ENCOUNTER (EMERGENCY)
Facility: HOSPITAL | Age: 63
Discharge: HOME | End: 2023-08-30
Attending: EMERGENCY MEDICINE
Payer: MEDICARE

## 2023-08-30 VITALS
DIASTOLIC BLOOD PRESSURE: 61 MMHG | OXYGEN SATURATION: 97 % | HEART RATE: 66 BPM | WEIGHT: 294 LBS | TEMPERATURE: 99.2 F | HEIGHT: 72 IN | RESPIRATION RATE: 15 BRPM | SYSTOLIC BLOOD PRESSURE: 112 MMHG | BODY MASS INDEX: 39.82 KG/M2

## 2023-08-30 DIAGNOSIS — U07.1 COVID-19: Primary | ICD-10-CM

## 2023-08-30 LAB
FLUAV RNA SPEC QL NAA+PROBE: NOT DETECTED
FLUBV RNA SPEC QL NAA+PROBE: NOT DETECTED
SARS-COV-2 RNA RESP QL NAA+PROBE: DETECTED

## 2023-08-30 PROCEDURE — 87636 SARSCOV2 & INF A&B AMP PRB: CPT

## 2023-08-30 PROCEDURE — 99285 EMERGENCY DEPT VISIT HI MDM: CPT

## 2023-08-30 PROCEDURE — 71045 X-RAY EXAM CHEST 1 VIEW: CPT

## 2023-08-30 RX ORDER — BENZONATATE 100 MG/1
100 CAPSULE ORAL 3 TIMES DAILY PRN
Qty: 30 CAPSULE | Refills: 0 | Status: SHIPPED | OUTPATIENT
Start: 2023-08-30 | End: 2023-09-09

## 2023-08-30 RX ORDER — GUAIFENESIN 600 MG/1
600 TABLET, EXTENDED RELEASE ORAL 2 TIMES DAILY
Qty: 30 TABLET | Refills: 0 | Status: SHIPPED | OUTPATIENT
Start: 2023-08-30 | End: 2023-09-14

## 2023-08-30 ASSESSMENT — LIFESTYLE VARIABLES
HOW MANY STANDARD DRINKS CONTAINING ALCOHOL DO YOU HAVE ON A TYPICAL DAY: PATIENT DOES NOT DRINK
HOW OFTEN DO YOU HAVE A DRINK CONTAINING ALCOHOL: NEVER

## 2023-08-30 ASSESSMENT — PAIN DESCRIPTION - LOCATION: LOCATION: RIB CAGE

## 2023-08-30 ASSESSMENT — PAIN - FUNCTIONAL ASSESSMENT: PAIN_FUNCTIONAL_ASSESSMENT: 0-10

## 2023-08-30 ASSESSMENT — PAIN DESCRIPTION - ORIENTATION: ORIENTATION: RIGHT;LEFT

## 2023-08-30 ASSESSMENT — PAIN SCALES - GENERAL
PAINLEVEL_OUTOF10: 8
PAINLEVEL_OUTOF10: 0

## 2023-08-30 NOTE — ED NOTES
Patients family refusing to come  patient due to COVID diagnosis. Pt and family member on phone educated scarcity of transportation options in area. Patient and family member both verbally angry with this writer. NS made aware of conversation.        Anitra Davila RN  08/30/23 1404

## 2023-08-30 NOTE — ED NOTES
Discharge instructions and medications reviewed with Pt. Pt states he will call medicare taxi and attempt to arrange transport.       Kristina Fraire RN  08/30/23 0027

## 2023-08-30 NOTE — ED TRIAGE NOTES
Pt arrived with c/o cough, congestion, h/a fever that started 2 days ago. Pt family at home have all tested positive for covid.

## 2023-08-30 NOTE — ED NOTES
Medicare taxi arranged for Pt to be picked up at [address] [place] [address].       Arline Frey RN  08/30/23 0291

## 2023-09-02 LAB
EKG ATRIAL RATE: 67 BPM
EKG DIAGNOSIS: NORMAL
EKG P-R INTERVAL: 216 MS
EKG Q-T INTERVAL: 434 MS
EKG QRS DURATION: 106 MS
EKG QTC CALCULATION (BAZETT): 458 MS
EKG R AXIS: -10 DEGREES
EKG T AXIS: 33 DEGREES
EKG VENTRICULAR RATE: 67 BPM

## 2023-09-06 ENCOUNTER — OFFICE VISIT (OUTPATIENT)
Age: 63
End: 2023-09-06
Payer: MEDICAID

## 2023-09-06 VITALS
WEIGHT: 289.8 LBS | TEMPERATURE: 98.6 F | DIASTOLIC BLOOD PRESSURE: 60 MMHG | OXYGEN SATURATION: 96 % | HEIGHT: 72 IN | HEART RATE: 67 BPM | RESPIRATION RATE: 17 BRPM | SYSTOLIC BLOOD PRESSURE: 108 MMHG | BODY MASS INDEX: 39.25 KG/M2

## 2023-09-06 DIAGNOSIS — U07.1 COVID-19: Primary | ICD-10-CM

## 2023-09-06 LAB
LOT EXPIRE DATE: ABNORMAL
LOT KIT NUMBER: ABNORMAL
SARS-COV-2, POC: DETECTED
VALID INTERNAL CONTROL: YES
VENDOR AND KIT NAME POC: ABNORMAL

## 2023-09-06 PROCEDURE — G8417 CALC BMI ABV UP PARAM F/U: HCPCS

## 2023-09-06 PROCEDURE — 87426 SARSCOV CORONAVIRUS AG IA: CPT

## 2023-09-06 PROCEDURE — 99213 OFFICE O/P EST LOW 20 MIN: CPT

## 2023-09-06 PROCEDURE — 1123F ACP DISCUSS/DSCN MKR DOCD: CPT

## 2023-09-06 PROCEDURE — 1036F TOBACCO NON-USER: CPT

## 2023-09-06 PROCEDURE — G8427 DOCREV CUR MEDS BY ELIG CLIN: HCPCS

## 2023-09-06 PROCEDURE — 3017F COLORECTAL CA SCREEN DOC REV: CPT

## 2023-09-06 ASSESSMENT — ENCOUNTER SYMPTOMS
COUGH: 1
SHORTNESS OF BREATH: 0
WHEEZING: 0

## 2023-09-06 ASSESSMENT — PATIENT HEALTH QUESTIONNAIRE - PHQ9
SUM OF ALL RESPONSES TO PHQ QUESTIONS 1-9: 0
1. LITTLE INTEREST OR PLEASURE IN DOING THINGS: 0
2. FEELING DOWN, DEPRESSED OR HOPELESS: 0
SUM OF ALL RESPONSES TO PHQ QUESTIONS 1-9: 0
SUM OF ALL RESPONSES TO PHQ9 QUESTIONS 1 & 2: 0

## 2023-09-06 NOTE — PROGRESS NOTES
Chief Complaint   Patient presents with    Post-COVID Symptoms     Wants tested to make such he's not covid positive       HPI:     is a 70 y.o. male who presents for an acute visit. He endorses nasal congestion, cough, and chest tightness that began about 10 days ago; he was seen on 8/30 for these symptoms an tested positive for COVID. His symptoms have improved and nearly resolved, though he continues to have occasional cough. He is scheduled for TAVR on 9/13 and requests repeat COVID testing to determine if he can procedure with surgery. Allergies   Allergen Reactions    Ceftriaxone Dermatitis    Vancomycin Dermatitis       Current Outpatient Medications   Medication Sig Dispense Refill    benzonatate (TESSALON) 100 MG capsule Take 1 capsule by mouth 3 times daily as needed for Cough 30 capsule 0    guaiFENesin (MUCINEX) 600 MG extended release tablet Take 1 tablet by mouth 2 times daily for 15 days 30 tablet 0    [START ON 9/17/2023] gabapentin (NEURONTIN) 300 MG capsule Take 2 capsules by mouth 3 times daily for 90 days.  Max Daily Amount: 1,800 mg 540 capsule 0    atorvastatin (LIPITOR) 40 MG tablet TAKE ONE TABLET BY MOUTH DAILY AT 5PM 90 tablet 1    traZODone (DESYREL) 50 MG tablet TAKE ONE TABLET BY MOUTH DAILY AT 9PM NIGHTLY 90 tablet 1    amiodarone (CORDARONE) 200 MG tablet TAKE ONE TABLET BY MOUTH DAILY AT 9AM 90 tablet 3    apixaban (ELIQUIS) 5 MG TABS tablet Take 1 tablet by mouth 2 times daily      fluticasone furoate-vilanterol (BREO ELLIPTA) 200-25 MCG/ACT AEPB inhaler Inhale 1 puff into the lungs daily      furosemide (LASIX) 40 MG tablet Take 1 tablet by mouth daily      loratadine (CLARITIN) 10 MG tablet Take 1 tablet by mouth      magnesium oxide (MAG-OX) 400 MG tablet Take 1 tablet by mouth daily      pantoprazole (PROTONIX) 40 MG tablet Take 1 tablet by mouth daily      sacubitril-valsartan (ENTRESTO) 24-26 MG per tablet Take 1 tablet by mouth 2 times daily      thiamine 100

## 2023-09-11 ENCOUNTER — NURSE ONLY (OUTPATIENT)
Age: 63
End: 2023-09-11
Payer: MEDICAID

## 2023-09-11 DIAGNOSIS — Z13.9 SCREENING DUE: Primary | ICD-10-CM

## 2023-09-11 LAB
EXP DATE SOLUTION: ABNORMAL
EXP DATE SWAB: ABNORMAL
EXPIRATION DATE: ABNORMAL
LOT NUMBER POC: ABNORMAL
LOT NUMBER SOLUTION: ABNORMAL
LOT NUMBER SWAB: ABNORMAL
SARS-COV-2 RNA, POC: NEGATIVE

## 2023-09-11 PROCEDURE — 87635 SARS-COV-2 COVID-19 AMP PRB: CPT

## 2023-09-14 ENCOUNTER — TELEPHONE (OUTPATIENT)
Age: 63
End: 2023-09-14

## 2023-09-14 NOTE — TELEPHONE ENCOUNTER
Confirmed with patient their upcoming appt for 09/18/2023. He also states he has no symptoms of COVID.

## 2023-10-20 ENCOUNTER — OFFICE VISIT (OUTPATIENT)
Age: 63
End: 2023-10-20

## 2023-10-20 VITALS
SYSTOLIC BLOOD PRESSURE: 135 MMHG | OXYGEN SATURATION: 96 % | WEIGHT: 290.13 LBS | RESPIRATION RATE: 24 BRPM | BODY MASS INDEX: 39.3 KG/M2 | HEIGHT: 72 IN | DIASTOLIC BLOOD PRESSURE: 82 MMHG | HEART RATE: 70 BPM | TEMPERATURE: 98.4 F

## 2023-10-20 DIAGNOSIS — R42 DIZZINESS: ICD-10-CM

## 2023-10-20 DIAGNOSIS — Z95.2 S/P TAVR (TRANSCATHETER AORTIC VALVE REPLACEMENT): ICD-10-CM

## 2023-10-20 DIAGNOSIS — G89.29 CHRONIC RIGHT SHOULDER PAIN: ICD-10-CM

## 2023-10-20 DIAGNOSIS — Z09 HOSPITAL DISCHARGE FOLLOW-UP: Primary | ICD-10-CM

## 2023-10-20 DIAGNOSIS — Z23 FLU VACCINE NEED: ICD-10-CM

## 2023-10-20 DIAGNOSIS — I10 ESSENTIAL (PRIMARY) HYPERTENSION: ICD-10-CM

## 2023-10-20 DIAGNOSIS — M72.0 DUPUYTREN'S CONTRACTURE OF BOTH HANDS: ICD-10-CM

## 2023-10-20 DIAGNOSIS — Z91.81 AT HIGH RISK FOR FALLS: ICD-10-CM

## 2023-10-20 DIAGNOSIS — I48.0 PAF (PAROXYSMAL ATRIAL FIBRILLATION) (HCC): ICD-10-CM

## 2023-10-20 DIAGNOSIS — M54.50 CHRONIC MIDLINE LOW BACK PAIN WITHOUT SCIATICA: ICD-10-CM

## 2023-10-20 DIAGNOSIS — G89.29 CHRONIC MIDLINE LOW BACK PAIN WITHOUT SCIATICA: ICD-10-CM

## 2023-10-20 DIAGNOSIS — M25.511 CHRONIC RIGHT SHOULDER PAIN: ICD-10-CM

## 2023-10-20 RX ORDER — CLINDAMYCIN HYDROCHLORIDE 300 MG/1
CAPSULE ORAL
COMMUNITY
Start: 2023-10-14

## 2023-10-20 SDOH — ECONOMIC STABILITY: FOOD INSECURITY: WITHIN THE PAST 12 MONTHS, YOU WORRIED THAT YOUR FOOD WOULD RUN OUT BEFORE YOU GOT MONEY TO BUY MORE.: NEVER TRUE

## 2023-10-20 SDOH — ECONOMIC STABILITY: INCOME INSECURITY: HOW HARD IS IT FOR YOU TO PAY FOR THE VERY BASICS LIKE FOOD, HOUSING, MEDICAL CARE, AND HEATING?: NOT HARD AT ALL

## 2023-10-20 SDOH — ECONOMIC STABILITY: FOOD INSECURITY: WITHIN THE PAST 12 MONTHS, THE FOOD YOU BOUGHT JUST DIDN'T LAST AND YOU DIDN'T HAVE MONEY TO GET MORE.: NEVER TRUE

## 2023-10-20 ASSESSMENT — ENCOUNTER SYMPTOMS
BLOOD IN STOOL: 0
EYES NEGATIVE: 1
SHORTNESS OF BREATH: 0
COUGH: 0
RESPIRATORY NEGATIVE: 1
CONSTIPATION: 0
DIARRHEA: 0
WHEEZING: 0
ALLERGIC/IMMUNOLOGIC NEGATIVE: 1

## 2023-10-20 ASSESSMENT — PATIENT HEALTH QUESTIONNAIRE - PHQ9
SUM OF ALL RESPONSES TO PHQ QUESTIONS 1-9: 0
SUM OF ALL RESPONSES TO PHQ QUESTIONS 1-9: 0
1. LITTLE INTEREST OR PLEASURE IN DOING THINGS: 0
SUM OF ALL RESPONSES TO PHQ QUESTIONS 1-9: 0
2. FEELING DOWN, DEPRESSED OR HOPELESS: 0
SUM OF ALL RESPONSES TO PHQ9 QUESTIONS 1 & 2: 0
SUM OF ALL RESPONSES TO PHQ QUESTIONS 1-9: 0

## 2023-11-01 DIAGNOSIS — I48.0 PAROXYSMAL ATRIAL FIBRILLATION (HCC): ICD-10-CM

## 2023-11-01 RX ORDER — AMIODARONE HYDROCHLORIDE 200 MG/1
TABLET ORAL
Qty: 90 TABLET | Refills: 1 | Status: SHIPPED | OUTPATIENT
Start: 2023-11-01

## 2023-11-01 NOTE — TELEPHONE ENCOUNTER
Medication Refill Request    Ayn Irbyen Jessenia is requesting a refill of the following medication(s):   amiodarone (CORDARONE) 200 MG tablet  Please send refill to:     [facility] [place] - [address], [place] - [address] [facility] [phone]  [address] [address]  Phone: [phone] Fax: [phone]

## 2023-11-13 ENCOUNTER — NURSE TRIAGE (OUTPATIENT)
Dept: OTHER | Facility: CLINIC | Age: 63
End: 2023-11-13

## 2023-11-13 ENCOUNTER — OFFICE VISIT (OUTPATIENT)
Age: 63
End: 2023-11-13
Payer: MEDICAID

## 2023-11-13 VITALS
HEART RATE: 106 BPM | SYSTOLIC BLOOD PRESSURE: 110 MMHG | TEMPERATURE: 98.2 F | BODY MASS INDEX: 39.35 KG/M2 | HEIGHT: 72 IN | DIASTOLIC BLOOD PRESSURE: 70 MMHG | RESPIRATION RATE: 24 BRPM

## 2023-11-13 DIAGNOSIS — J18.9 COMMUNITY ACQUIRED PNEUMONIA OF RIGHT UPPER LOBE OF LUNG: Primary | ICD-10-CM

## 2023-11-13 LAB
RSV RNA, POC: NEGATIVE
VALID INTERNAL CONTROL, POC: YES

## 2023-11-13 PROCEDURE — 87634 RSV DNA/RNA AMP PROBE: CPT

## 2023-11-13 PROCEDURE — 99214 OFFICE O/P EST MOD 30 MIN: CPT

## 2023-11-13 PROCEDURE — 1123F ACP DISCUSS/DSCN MKR DOCD: CPT

## 2023-11-13 RX ORDER — BENZONATATE 100 MG/1
100 CAPSULE ORAL 3 TIMES DAILY PRN
Qty: 30 CAPSULE | Refills: 0 | Status: SHIPPED | OUTPATIENT
Start: 2023-11-13 | End: 2023-11-23

## 2023-11-13 RX ORDER — AZITHROMYCIN 250 MG/1
250 TABLET, FILM COATED ORAL SEE ADMIN INSTRUCTIONS
Qty: 6 TABLET | Refills: 0 | Status: SHIPPED | OUTPATIENT
Start: 2023-11-13 | End: 2023-11-18

## 2023-11-13 RX ORDER — METHYLPREDNISOLONE 4 MG/1
TABLET ORAL
Qty: 1 KIT | Refills: 0 | Status: SHIPPED | OUTPATIENT
Start: 2023-11-13 | End: 2023-11-19

## 2023-11-13 RX ORDER — ALBUTEROL SULFATE 90 UG/1
2 AEROSOL, METERED RESPIRATORY (INHALATION) EVERY 6 HOURS PRN
Qty: 18 G | Refills: 3 | Status: SHIPPED | OUTPATIENT
Start: 2023-11-13

## 2023-11-13 RX ORDER — AMOXICILLIN AND CLAVULANATE POTASSIUM 875; 125 MG/1; MG/1
1 TABLET, FILM COATED ORAL 2 TIMES DAILY
Qty: 20 TABLET | Refills: 0 | Status: SHIPPED | OUTPATIENT
Start: 2023-11-13 | End: 2023-11-23

## 2023-11-13 ASSESSMENT — PATIENT HEALTH QUESTIONNAIRE - PHQ9
SUM OF ALL RESPONSES TO PHQ QUESTIONS 1-9: 0
SUM OF ALL RESPONSES TO PHQ QUESTIONS 1-9: 0
SUM OF ALL RESPONSES TO PHQ9 QUESTIONS 1 & 2: 0
SUM OF ALL RESPONSES TO PHQ QUESTIONS 1-9: 0
2. FEELING DOWN, DEPRESSED OR HOPELESS: 0
SUM OF ALL RESPONSES TO PHQ QUESTIONS 1-9: 0
1. LITTLE INTEREST OR PLEASURE IN DOING THINGS: 0

## 2023-11-13 ASSESSMENT — ENCOUNTER SYMPTOMS
WHEEZING: 1
GASTROINTESTINAL NEGATIVE: 1
COUGH: 1
SHORTNESS OF BREATH: 1
CHEST TIGHTNESS: 1
SORE THROAT: 0

## 2023-11-13 NOTE — PROGRESS NOTES
Chief Complaint   Patient presents with    Cough     And chest congestion    Nasal Congestion     Started Saturday       HPI:     is a 70 y.o. male who presents for an acute visit. He endorses cough, SOB, wheezing that began about three days ago; he also notes some right anterior chest tightness when taking a deep breath. He denies fever, chills, dizziness, syncope. Allergies   Allergen Reactions    Soap Hives     Patient is allergic to Sween body lotion, applied on face twice during hospital stay, with extensive erythema on face. Improving with topical steroids. Patient is allergic to Sween body lotion, applied on face twice during hospital stay, with extensive erythema on face. Improving with topical steroids. Sulfamethoxazole-Trimethoprim Rash     Pt states rash on left arm developed, \"pretty severe\". Pt states Dr told him not to take this anymore. Ceftriaxone Dermatitis    Vancomycin Dermatitis       Current Outpatient Medications   Medication Sig Dispense Refill    azithromycin (ZITHROMAX) 250 MG tablet Take 1 tablet by mouth See Admin Instructions for 5 days 500mg on day 1 followed by 250mg on days 2 - 5 6 tablet 0    amoxicillin-clavulanate (AUGMENTIN) 875-125 MG per tablet Take 1 tablet by mouth 2 times daily for 10 days 20 tablet 0    albuterol sulfate HFA (PROVENTIL HFA) 108 (90 Base) MCG/ACT inhaler Inhale 2 puffs into the lungs every 6 hours as needed for Wheezing 18 g 3    methylPREDNISolone (MEDROL DOSEPACK) 4 MG tablet Take by mouth. 1 kit 0    benzonatate (TESSALON) 100 MG capsule Take 1 capsule by mouth 3 times daily as needed for Cough 30 capsule 0    amiodarone (CORDARONE) 200 MG tablet TAKE ONE TABLET BY MOUTH DAILY AT 9AM 90 tablet 1    gabapentin (NEURONTIN) 300 MG capsule Take 2 capsules by mouth 3 times daily for 90 days.  Max Daily Amount: 1,800 mg 540 capsule 0    atorvastatin (LIPITOR) 40 MG tablet TAKE ONE TABLET BY MOUTH DAILY AT 5PM 90 tablet 1    traZODone

## 2023-11-13 NOTE — TELEPHONE ENCOUNTER
[place]    Received call from [address] at [facility] with [employer] Complaint. Subjective: Caller states \"I have sinus congestion, wheezing and chest discomfort from cough\"   I feel my heart out of rhythm. Recently diagnosed with afib. Current Symptoms:     Onset: 1 day ago;     Associated Symptoms:     Pain Severity: 2/10; aching; with deep breath and cough    Temperature:      What has been tried: nothing    LMP: NA Pregnant: NA    Recommended disposition: Go to office now    Care advice provided, patient verbalizes understanding; denies any other questions or concerns; instructed to call back for any new or worsening symptoms. Patient/Caller agrees with recommended disposition; writer provided warm transfer to [facility] at [facility] for appointment scheduling    Attention Provider: Thank you for allowing me to participate in the care of your patient. The patient was connected to triage in response to information provided to the ECC/PSC. Please do not respond through this encounter as the response is not directed to a shared pool.     Reason for Disposition   Patient wants to be seen   Patient wants to be seen   Wheezing is present    Protocols used: Common Cold-ADULT-[place], Breathing Difficulty-ADULT-[place], Cough-ADULT-[place]

## 2024-02-06 DIAGNOSIS — G62.9 POLYNEUROPATHY, UNSPECIFIED: ICD-10-CM

## 2024-02-06 RX ORDER — GABAPENTIN 300 MG/1
600 CAPSULE ORAL 3 TIMES DAILY
Qty: 540 CAPSULE | Refills: 0 | Status: SHIPPED | OUTPATIENT
Start: 2024-02-06 | End: 2024-05-06

## 2024-02-06 NOTE — TELEPHONE ENCOUNTER
Medication Refill Request    Job Jessenia is requesting a refill of the following medication(s):   gabapentin (NEURONTIN) 300 MG capsule   Please send refill to:     [facility]/pharmacy #[id number] - [place], [place] - [address] [facility] [phone] - F [phone]  [address] [facility] [address]  Phone: [phone] Fax: [phone]    Has appointment on 2/9

## 2024-02-09 ENCOUNTER — OFFICE VISIT (OUTPATIENT)
Age: 64
End: 2024-02-09
Payer: MEDICAID

## 2024-02-09 VITALS
HEIGHT: 72 IN | WEIGHT: 281.6 LBS | HEART RATE: 78 BPM | OXYGEN SATURATION: 99 % | DIASTOLIC BLOOD PRESSURE: 64 MMHG | TEMPERATURE: 98 F | BODY MASS INDEX: 38.14 KG/M2 | RESPIRATION RATE: 20 BRPM | SYSTOLIC BLOOD PRESSURE: 118 MMHG

## 2024-02-09 DIAGNOSIS — E78.2 MIXED HYPERLIPIDEMIA: ICD-10-CM

## 2024-02-09 DIAGNOSIS — B86 SCABIES: ICD-10-CM

## 2024-02-09 DIAGNOSIS — G89.29 CHRONIC RIGHT SHOULDER PAIN: ICD-10-CM

## 2024-02-09 DIAGNOSIS — I48.0 PAF (PAROXYSMAL ATRIAL FIBRILLATION) (HCC): ICD-10-CM

## 2024-02-09 DIAGNOSIS — R07.89 CHEST TIGHTNESS: Primary | ICD-10-CM

## 2024-02-09 DIAGNOSIS — Z95.2 S/P TAVR (TRANSCATHETER AORTIC VALVE REPLACEMENT): ICD-10-CM

## 2024-02-09 DIAGNOSIS — Z12.5 ENCOUNTER FOR SCREENING FOR MALIGNANT NEOPLASM OF PROSTATE: ICD-10-CM

## 2024-02-09 DIAGNOSIS — E11.42 TYPE 2 DIABETES MELLITUS WITH DIABETIC POLYNEUROPATHY, WITHOUT LONG-TERM CURRENT USE OF INSULIN (HCC): ICD-10-CM

## 2024-02-09 DIAGNOSIS — M25.511 CHRONIC RIGHT SHOULDER PAIN: ICD-10-CM

## 2024-02-09 LAB — HBA1C MFR BLD: 8.7 %

## 2024-02-09 PROCEDURE — 36415 COLL VENOUS BLD VENIPUNCTURE: CPT

## 2024-02-09 PROCEDURE — 99214 OFFICE O/P EST MOD 30 MIN: CPT

## 2024-02-09 PROCEDURE — 1123F ACP DISCUSS/DSCN MKR DOCD: CPT

## 2024-02-09 PROCEDURE — 93000 ELECTROCARDIOGRAM COMPLETE: CPT

## 2024-02-09 PROCEDURE — 83036 HEMOGLOBIN GLYCOSYLATED A1C: CPT

## 2024-02-09 RX ORDER — PERMETHRIN 50 MG/G
CREAM TOPICAL
Qty: 60 G | Refills: 0 | Status: SHIPPED | OUTPATIENT
Start: 2024-02-09

## 2024-02-09 RX ORDER — VALSARTAN 80 MG/1
80 TABLET ORAL 2 TIMES DAILY
COMMUNITY
Start: 2024-01-25 | End: 2025-01-24

## 2024-02-09 NOTE — PROGRESS NOTES
\"Have you been to the ER, urgent care clinic since your last visit?  Hospitalized since your last visit?\"    NO    “Have you seen or consulted any other health care providers outside of [facility] since your last visit?”    NO    “Have you had a colorectal cancer screening such as a colonoscopy/FIT/Cologuard?    NO       Chief Complaint   Patient presents with    Fatigue     X 2 wks     Chest Tightness     X 2 wks     Rash     Itching from \"mange\"       Vitals:    02/09/24 1512   BP: 118/64   Pulse: 78   Resp: 20   Temp: 98 °F (36.7 °C)   SpO2: 99%       Labs drawn from right hand  per Rayna Trimble NP's orders. Patient tolerated well.

## 2024-02-10 LAB
ANION GAP SERPL CALC-SCNC: 3 MMOL/L (ref 5–15)
BUN SERPL-MCNC: 34 MG/DL (ref 6–20)
BUN/CREAT SERPL: 15 (ref 12–20)
CALCIUM SERPL-MCNC: 9.3 MG/DL (ref 8.5–10.1)
CHLORIDE SERPL-SCNC: 101 MMOL/L (ref 97–108)
CO2 SERPL-SCNC: 30 MMOL/L (ref 21–32)
CREAT SERPL-MCNC: 2.32 MG/DL (ref 0.7–1.3)
GLUCOSE SERPL-MCNC: 221 MG/DL (ref 65–100)
MAGNESIUM SERPL-MCNC: 2.2 MG/DL (ref 1.6–2.4)
NT PRO BNP: 148 PG/ML
POTASSIUM SERPL-SCNC: 4.3 MMOL/L (ref 3.5–5.1)
SODIUM SERPL-SCNC: 134 MMOL/L (ref 136–145)

## 2024-02-12 NOTE — PROGRESS NOTES
Chief Complaint   Patient presents with    Fatigue     X 2 wks     Chest Tightness     X 2 wks     Rash     Itching from \"mange\"       HPI:     is a 63 y.o. male who presents for chronic follow-up.      Cardiac: PAF, rate controlled on amiodarone, on Eliquis; denies palpitations.  Severe aortic stenosis, s/p TAVR 10/4/23.  Cath 08/2023 showed severe nonobstructive CAD; HFpEF, compliant with Lasix with minimal lower extremity edema, notes a significant drop in weight in the past few weeks.  Carotid stenosis s/p left endarterectomy, not currently on antiplatelet therapy.  He endorses occasional chest pain over the past couple of months for which he saw his cardiologist last month.     HLD:  Compliant with atorvastatin; LDL 39.  History of bilateral ICA stenosis S/P left endarterectomy.     DM:  Diet controlled; A1C 7.5-->6.2%.     GERD:  EGD 11/2022 showing non-bleeding gastric ulcers; remains on daily pantoprazole.      New issues:  He got a new dog in the home who had mange and developed red, itchy rash; this was treated with topical permethrin but he continues to notice new rash.  He has some right shoulder pain that is chronic, but wonders what he can take for discomfort.    Allergies   Allergen Reactions    Soap Hives     Patient is allergic to Sween body lotion, applied on face twice during hospital stay, with extensive erythema on face. Improving with topical steroids.   Patient is allergic to Sween body lotion, applied on face twice during hospital stay, with extensive erythema on face. Improving with topical steroids.     Sulfamethoxazole-Trimethoprim Rash     Pt states rash on left arm developed, \"pretty severe\". Pt states Dr told him not to take this anymore.    Ceftriaxone Dermatitis    Vancomycin Dermatitis       Current Outpatient Medications   Medication Sig Dispense Refill    empagliflozin (JARDIANCE) 25 MG tablet Take 1 tablet by mouth daily 90 tablet 0    valsartan (DIOVAN) 80 MG tablet Take 1

## 2024-02-29 DIAGNOSIS — G62.9 POLYNEUROPATHY, UNSPECIFIED: ICD-10-CM

## 2024-03-01 RX ORDER — GABAPENTIN 300 MG/1
600 CAPSULE ORAL 3 TIMES DAILY
Qty: 540 CAPSULE | Refills: 0 | OUTPATIENT
Start: 2024-03-01 | End: 2024-05-30

## 2024-04-02 DIAGNOSIS — F51.01 PRIMARY INSOMNIA: ICD-10-CM

## 2024-04-02 NOTE — TELEPHONE ENCOUNTER
Patient requesting refill on     Requested Prescriptions     Pending Prescriptions Disp Refills    traZODone (DESYREL) 50 MG tablet [Pharmacy Med Name: trazodone 50 mg tablet] 30 tablet 11     Sig: TAKE ONE TABLET BY MOUTH DAILY AT 9PM NIGHTLY        Last OV 2/9/2024

## 2024-04-03 RX ORDER — TRAZODONE HYDROCHLORIDE 50 MG/1
TABLET ORAL
Qty: 30 TABLET | Refills: 11 | Status: SHIPPED | OUTPATIENT
Start: 2024-04-03

## 2024-04-23 DIAGNOSIS — E78.2 MIXED HYPERLIPIDEMIA: ICD-10-CM

## 2024-04-23 RX ORDER — ATORVASTATIN CALCIUM 40 MG/1
TABLET, FILM COATED ORAL
Qty: 90 TABLET | Refills: 1 | Status: SHIPPED | OUTPATIENT
Start: 2024-04-23

## 2024-04-23 NOTE — TELEPHONE ENCOUNTER
Patient requesting refill on     Requested Prescriptions     Pending Prescriptions Disp Refills    atorvastatin (LIPITOR) 40 MG tablet [Pharmacy Med Name: atorvastatin 40 mg tablet] 90 tablet 3     Sig: TAKE ONE TABLET BY MOUTH DAILY AT 5PM        Last OV 2/9/2024

## 2024-04-26 ENCOUNTER — HOSPITAL ENCOUNTER (EMERGENCY)
Facility: HOSPITAL | Age: 64
Discharge: HOME | End: 2024-04-26
Attending: EMERGENCY MEDICINE
Payer: MEDICARE

## 2024-04-26 VITALS
DIASTOLIC BLOOD PRESSURE: 68 MMHG | HEART RATE: 64 BPM | WEIGHT: 283 LBS | TEMPERATURE: 98.1 F | SYSTOLIC BLOOD PRESSURE: 156 MMHG | BODY MASS INDEX: 38.33 KG/M2 | RESPIRATION RATE: 18 BRPM | HEIGHT: 72 IN | OXYGEN SATURATION: 99 %

## 2024-04-26 DIAGNOSIS — I73.9 PVD (PERIPHERAL VASCULAR DISEASE) (HCC): Primary | ICD-10-CM

## 2024-04-26 PROCEDURE — 99283 EMERGENCY DEPT VISIT LOW MDM: CPT

## 2024-04-26 ASSESSMENT — PAIN - FUNCTIONAL ASSESSMENT: PAIN_FUNCTIONAL_ASSESSMENT: NONE - DENIES PAIN

## 2024-04-26 ASSESSMENT — ENCOUNTER SYMPTOMS
NAUSEA: 0
EYE REDNESS: 0
VOMITING: 0
ABDOMINAL PAIN: 0
DIARRHEA: 0
SHORTNESS OF BREATH: 0
COUGH: 0
SORE THROAT: 0

## 2024-04-26 NOTE — FLOWSHEET NOTE
Written and verbal discharge instructions reviewed with patient. All discharge medications reviewed and explained. Understanding verbalized, all questions answered. To lobby in  to wait for ride home

## 2024-04-26 NOTE — ED PROVIDER NOTES
EMERGENCY DEPARTMENT HISTORY AND PHYSICAL EXAM      Date: (Not on file)  Patient Name: Job Ruelas    History of Presenting Illness     No chief complaint on file.      History Provided By: Patient    HPI: Job Ruelas, 63 y.o. male with past medical history listed below, presents via private vehicle to the ED with cc of poor circulation in his legs.  Patient has known PVD with hyperpigmentation in his lower extremities.  He reports over the past month the skin changes of risen up his lower legs up to his knee area.  He denies any pain.  He has been ambulating without difficulty.  He reports he called his insurance company today and they recommended he come to the ER for evaluation.  He is otherwise without complaints.  No chest pain or shortness of breath.        There are no other complaints, changes, or physical findings at this time.    PCP: Rayna Trimble APRN - NP    No current facility-administered medications on file prior to encounter.     Current Outpatient Medications on File Prior to Encounter   Medication Sig Dispense Refill    atorvastatin (LIPITOR) 40 MG tablet TAKE ONE TABLET BY MOUTH DAILY AT 5PM 90 tablet 1    traZODone (DESYREL) 50 MG tablet TAKE ONE TABLET BY MOUTH DAILY AT 9PM NIGHTLY 30 tablet 11    empagliflozin (JARDIANCE) 25 MG tablet Take 1 tablet by mouth daily 90 tablet 0    valsartan (DIOVAN) 80 MG tablet Take 1 tablet by mouth 2 times daily      permethrin (ELIMITE) 5 % cream Apply topically as directed 60 g 0    diclofenac sodium (VOLTAREN) 1 % GEL Apply 2 g topically 4 times daily 350 g 1    gabapentin (NEURONTIN) 300 MG capsule Take 2 capsules by mouth 3 times daily for 90 days. Max Daily Amount: 1,800 mg 540 capsule 0    albuterol sulfate HFA (PROVENTIL HFA) 108 (90 Base) MCG/ACT inhaler Inhale 2 puffs into the lungs every 6 hours as needed for Wheezing 18 g 3    amiodarone (CORDARONE) 200 MG tablet TAKE ONE TABLET BY MOUTH DAILY AT 9AM 90 tablet 1    clindamycin (CLEOCIN) 300 MG

## 2024-05-06 DIAGNOSIS — G62.9 POLYNEUROPATHY, UNSPECIFIED: ICD-10-CM

## 2024-05-06 NOTE — TELEPHONE ENCOUNTER
----- Message from Imelda Henry sent at 5/6/2024 11:00 AM EDT -----  Subject: Refill Request    QUESTIONS  Name of Medication? gabapentin (NEURONTIN) 300 MG capsule  Patient-reported dosage and instructions? 300 mg  How many days do you have left? 2  Preferred Pharmacy? [id number]  Pharmacy phone number (if available)? [phone]  ---------------------------------------------------------------------------  --------------  CALL BACK INFO  What is the best way for the office to contact you? OK to leave message on   voicemail  Preferred Call Back Phone Number? [phone]  ---------------------------------------------------------------------------  --------------  SCRIPT ANSWERS  Relationship to Patient? Self

## 2024-05-07 DIAGNOSIS — G62.9 POLYNEUROPATHY, UNSPECIFIED: ICD-10-CM

## 2024-05-07 NOTE — TELEPHONE ENCOUNTER
Patient requesting refill on     Requested Prescriptions     Pending Prescriptions Disp Refills    gabapentin (NEURONTIN) 300 MG capsule 540 capsule 0     Sig: Take 2 capsules by mouth 3 times daily for 90 days. Max Daily Amount: 1,800 mg        Last OV 2/9/2024

## 2024-05-07 NOTE — TELEPHONE ENCOUNTER
Pt calling to check on the states of his gabapentin refill. Please advise. Pt would like call back.

## 2024-05-08 NOTE — TELEPHONE ENCOUNTER
Patient requesting refill on     Requested Prescriptions     Pending Prescriptions Disp Refills    gabapentin (NEURONTIN) 300 MG capsule [Pharmacy Med Name: GABAPENTIN 300 MG CAPSULE] 540 capsule 0     Sig: Take 2 capsules by mouth 3 times daily for 90 days. Max Daily Amount: 1,800 mg        Last OV 2/9/2024

## 2024-05-09 RX ORDER — GABAPENTIN 300 MG/1
600 CAPSULE ORAL 3 TIMES DAILY
Qty: 540 CAPSULE | Refills: 0 | Status: SHIPPED | OUTPATIENT
Start: 2024-05-09 | End: 2024-08-07

## 2024-05-09 RX ORDER — GABAPENTIN 300 MG/1
600 CAPSULE ORAL 3 TIMES DAILY
Qty: 540 CAPSULE | Refills: 0 | OUTPATIENT
Start: 2024-05-09 | End: 2024-08-07

## 2024-06-04 ENCOUNTER — TELEPHONE (OUTPATIENT)
Age: 64
End: 2024-06-04

## 2024-06-04 DIAGNOSIS — E11.42 TYPE 2 DIABETES MELLITUS WITH DIABETIC POLYNEUROPATHY, WITHOUT LONG-TERM CURRENT USE OF INSULIN (HCC): ICD-10-CM

## 2024-06-04 NOTE — TELEPHONE ENCOUNTER
Medication Refill Request    Job Ruelas is requesting a refill of the following medication(s):   empagliflozin (JARDIANCE) 25 MG tablet   Please send refill to:     [facility]/pharmacy #[id number] - [place], [place] - [address] [facility] [phone] - F [phone]  [address] [facility] [address]  Phone: [phone] Fax: [phone]

## 2024-06-04 NOTE — TELEPHONE ENCOUNTER
Patient requesting refill on     Requested Prescriptions     Pending Prescriptions Disp Refills    empagliflozin (JARDIANCE) 25 MG tablet 90 tablet 0     Sig: Take 1 tablet by mouth daily        Last OV 2/9/2024

## 2024-06-04 NOTE — TELEPHONE ENCOUNTER
LM for pt to call the office. The RX for Jardiance was sent in. He needs to make an appt for an OV and check A1C.

## 2024-07-01 DIAGNOSIS — I48.0 PAROXYSMAL ATRIAL FIBRILLATION (HCC): ICD-10-CM

## 2024-07-01 DIAGNOSIS — E11.42 TYPE 2 DIABETES MELLITUS WITH DIABETIC POLYNEUROPATHY, WITHOUT LONG-TERM CURRENT USE OF INSULIN (HCC): ICD-10-CM

## 2024-07-01 DIAGNOSIS — E78.2 MIXED HYPERLIPIDEMIA: ICD-10-CM

## 2024-07-01 RX ORDER — EMPAGLIFLOZIN 25 MG/1
25 TABLET, FILM COATED ORAL DAILY
Qty: 90 TABLET | Refills: 0 | Status: SHIPPED | OUTPATIENT
Start: 2024-07-01

## 2024-07-01 RX ORDER — ATORVASTATIN CALCIUM 40 MG/1
40 TABLET, FILM COATED ORAL DAILY
Qty: 90 TABLET | Refills: 1 | Status: SHIPPED | OUTPATIENT
Start: 2024-07-01

## 2024-07-01 RX ORDER — AMIODARONE HYDROCHLORIDE 200 MG/1
TABLET ORAL
Qty: 90 TABLET | Refills: 1 | Status: SHIPPED | OUTPATIENT
Start: 2024-07-01

## 2024-07-01 NOTE — TELEPHONE ENCOUNTER
Patient requesting refill on     Requested Prescriptions     Pending Prescriptions Disp Refills    atorvastatin (LIPITOR) 40 MG tablet [Pharmacy Med Name: ATORVASTATIN 40 MG TABLET] 90 tablet 1     Sig: TAKE 1 TABLET BY MOUTH EVERY DAY    JARDIANCE 25 MG tablet [Pharmacy Med Name: JARDIANCE 25 MG TABLET] 90 tablet 0     Sig: TAKE 1 TABLET BY MOUTH EVERY DAY        Last OV 2/9/2024

## 2024-07-01 NOTE — TELEPHONE ENCOUNTER
Medication Refill Request    Job Jessenia is requesting a refill of the following medication(s):   amiodarone (CORDARONE) 200 MG tablet   Please send refill to:     [facility]/pharmacy #[id number] - [place], [place] - [address] [facility] [phone] - F [phone]  [address] [facility] [address]  Phone: [phone] Fax: [phone]

## 2024-07-09 ENCOUNTER — TELEPHONE (OUTPATIENT)
Age: 64
End: 2024-07-09

## 2024-07-09 NOTE — TELEPHONE ENCOUNTER
Job needs a colonoscopy done and wants to know if Rayna can put in a referral to anyone or would he need to come in and see her first. Please advise.

## 2024-07-22 DIAGNOSIS — I48.0 PAROXYSMAL ATRIAL FIBRILLATION (HCC): ICD-10-CM

## 2024-07-22 RX ORDER — AMIODARONE HYDROCHLORIDE 200 MG/1
TABLET ORAL
Qty: 90 TABLET | Refills: 3 | OUTPATIENT
Start: 2024-07-22

## 2024-07-31 ENCOUNTER — APPOINTMENT (OUTPATIENT)
Facility: HOSPITAL | Age: 64
End: 2024-07-31
Payer: MEDICARE

## 2024-07-31 ENCOUNTER — HOSPITAL ENCOUNTER (EMERGENCY)
Facility: HOSPITAL | Age: 64
Discharge: HOME | End: 2024-08-01
Attending: FAMILY MEDICINE | Admitting: FAMILY MEDICINE
Payer: MEDICARE

## 2024-07-31 DIAGNOSIS — I95.9 HYPOTENSION, UNSPECIFIED HYPOTENSION TYPE: Primary | ICD-10-CM

## 2024-07-31 PROCEDURE — 71045 X-RAY EXAM CHEST 1 VIEW: CPT

## 2024-07-31 PROCEDURE — 99285 EMERGENCY DEPT VISIT HI MDM: CPT

## 2024-07-31 ASSESSMENT — PAIN - FUNCTIONAL ASSESSMENT: PAIN_FUNCTIONAL_ASSESSMENT: NONE - DENIES PAIN

## 2024-07-31 ASSESSMENT — LIFESTYLE VARIABLES
HOW MANY STANDARD DRINKS CONTAINING ALCOHOL DO YOU HAVE ON A TYPICAL DAY: 1 OR 2
HOW OFTEN DO YOU HAVE A DRINK CONTAINING ALCOHOL: MONTHLY OR LESS

## 2024-08-01 VITALS
BODY MASS INDEX: 38.06 KG/M2 | WEIGHT: 281 LBS | TEMPERATURE: 98.2 F | DIASTOLIC BLOOD PRESSURE: 64 MMHG | HEIGHT: 72 IN | RESPIRATION RATE: 15 BRPM | HEART RATE: 70 BPM | OXYGEN SATURATION: 96 % | SYSTOLIC BLOOD PRESSURE: 123 MMHG

## 2024-08-01 LAB
ALBUMIN SERPL-MCNC: 3.9 G/DL (ref 3.5–5)
ALBUMIN/GLOB SERPL: 1.3 (ref 1.1–2.2)
ALP SERPL-CCNC: 109 U/L (ref 45–117)
ALT SERPL-CCNC: 19 U/L (ref 12–78)
ANION GAP SERPL CALC-SCNC: 9 MMOL/L (ref 5–15)
AST SERPL-CCNC: 11 U/L (ref 15–37)
BASOPHILS # BLD: 0.1 K/UL (ref 0–0.1)
BASOPHILS NFR BLD: 1 % (ref 0–1)
BILIRUB SERPL-MCNC: 1.1 MG/DL (ref 0.2–1)
BUN SERPL-MCNC: 37 MG/DL (ref 6–20)
BUN/CREAT SERPL: 12 (ref 12–20)
CALCIUM SERPL-MCNC: 8.6 MG/DL (ref 8.5–10.1)
CHLORIDE SERPL-SCNC: 102 MMOL/L (ref 97–108)
CO2 SERPL-SCNC: 30 MMOL/L (ref 21–32)
CREAT SERPL-MCNC: 3.07 MG/DL (ref 0.7–1.3)
DIFFERENTIAL METHOD BLD: ABNORMAL
EOSINOPHIL # BLD: 0.1 K/UL (ref 0–0.4)
EOSINOPHIL NFR BLD: 1 % (ref 0–7)
ERYTHROCYTE [DISTWIDTH] IN BLOOD BY AUTOMATED COUNT: 15.5 % (ref 11.5–14.5)
GLOBULIN SER CALC-MCNC: 2.9 G/DL (ref 2–4)
GLUCOSE SERPL-MCNC: 151 MG/DL (ref 65–100)
HCT VFR BLD AUTO: 42.2 % (ref 36.6–50.3)
HGB BLD-MCNC: 13.6 G/DL (ref 12.1–17)
IMM GRANULOCYTES # BLD AUTO: 0.1 K/UL (ref 0–0.04)
IMM GRANULOCYTES NFR BLD AUTO: 1 % (ref 0–0.5)
LYMPHOCYTES # BLD: 1.5 K/UL (ref 0.8–3.5)
LYMPHOCYTES NFR BLD: 14 % (ref 12–49)
MCH RBC QN AUTO: 30.9 PG (ref 26–34)
MCHC RBC AUTO-ENTMCNC: 32.2 G/DL (ref 30–36.5)
MCV RBC AUTO: 95.9 FL (ref 80–99)
MONOCYTES # BLD: 0.7 K/UL (ref 0–1)
MONOCYTES NFR BLD: 7 % (ref 5–13)
NEUTS SEG # BLD: 8 K/UL (ref 1.8–8)
NEUTS SEG NFR BLD: 77 % (ref 32–75)
NRBC # BLD: 0 K/UL (ref 0–0.01)
NRBC BLD-RTO: 0 PER 100 WBC
NT PRO BNP: 839 PG/ML (ref 0–125)
PLATELET # BLD AUTO: 134 K/UL (ref 150–400)
PMV BLD AUTO: 10.9 FL (ref 8.9–12.9)
POTASSIUM SERPL-SCNC: 4.2 MMOL/L (ref 3.5–5.1)
PROT SERPL-MCNC: 6.8 G/DL (ref 6.4–8.2)
RBC # BLD AUTO: 4.4 M/UL (ref 4.1–5.7)
SODIUM SERPL-SCNC: 141 MMOL/L (ref 136–145)
TROPONIN I SERPL HS-MCNC: 43 NG/L (ref 0–76)
TROPONIN I SERPL HS-MCNC: 45 NG/L (ref 0–76)
WBC # BLD AUTO: 10.4 K/UL (ref 4.1–11.1)

## 2024-08-01 PROCEDURE — 36415 COLL VENOUS BLD VENIPUNCTURE: CPT

## 2024-08-01 PROCEDURE — 84484 ASSAY OF TROPONIN QUANT: CPT

## 2024-08-01 PROCEDURE — 80053 COMPREHEN METABOLIC PANEL: CPT

## 2024-08-01 PROCEDURE — 83880 ASSAY OF NATRIURETIC PEPTIDE: CPT

## 2024-08-01 PROCEDURE — 85025 COMPLETE CBC W/AUTO DIFF WBC: CPT

## 2024-08-01 NOTE — DISCHARGE INSTRUCTIONS
--Take your blood pressure at least twice daily for the next few days.  --Talk to Ms Trimble before you take your blood pressure medications.

## 2024-08-01 NOTE — ED TRIAGE NOTES
Patient came in with c/o hypotension. He states that hi BP was low at his PCP and when he got home it went even lower. (84/46) Patient denies any Chest pain or SOB

## 2024-08-01 NOTE — ED PROVIDER NOTES
[facility] EMERGENCY DEP  EMERGENCY DEPARTMENT ENCOUNTER       Pt Name: Job Ruelas  MRN: [id number]  Birthdate 1960  Date of evaluation: 7/31/2024  Provider: Lay Lopez MD   PCP: Rayna Trimble APRN - NP  Note Started: 11:27 PM EDT 7/31/24     CHIEF COMPLAINT       Chief Complaint   Patient presents with    Hypotension        HISTORY OF PRESENT ILLNESS: 1 or more elements      History From: Patient  HPI Limitations: None     Job Ruelas is a 64 y.o. male who presents to the ED with low blood pressure that he noted this afternoon. When he was at his pain management doctor, his blood pressure was in the 80's systolic, and he was urged to come to the ED. He has maybe some intermittent pain in his left anterior chest, lasting perhaps 30 seconds every 10 minutes or so. No dyspnea. He has had no fevers, chills, cough, headaches, or abdominal pain.     Nursing Notes were all reviewed and agreed with or any disagreements were addressed in the HPI.     REVIEW OF SYSTEMS      Review of Systems     Positives and Pertinent negatives as per HPI.    PAST HISTORY     Past Medical History:  Past Medical History:   Diagnosis Date    Diabetes (HCC)     Hypertension          Past Surgical History:  Past Surgical History:   Procedure Laterality Date    AMPUTATION      CARDIAC SURGERY         Family History:  Family History   Problem Relation Age of Onset    Cancer Mother     Cancer Sister     Cancer Brother        Social History:  Social History     Tobacco Use    Smoking status: Never    Smokeless tobacco: Never   Vaping Use    Vaping Use: Never used   Substance Use Topics    Alcohol use: Not Currently    Drug use: Never       Allergies:  Allergies   Allergen Reactions    Soap Hives     Patient is allergic to Sween body lotion, applied on face twice during hospital stay, with extensive erythema on face. Improving with topical steroids.   Patient is allergic to Sween body lotion, applied on face twice during hospital stay, with

## 2024-08-04 LAB
EKG ATRIAL RATE: 76 BPM
EKG DIAGNOSIS: NORMAL
EKG P AXIS: -58 DEGREES
EKG P-R INTERVAL: 248 MS
EKG Q-T INTERVAL: 448 MS
EKG QRS DURATION: 124 MS
EKG QTC CALCULATION (BAZETT): 504 MS
EKG R AXIS: -28 DEGREES
EKG T AXIS: 26 DEGREES
EKG VENTRICULAR RATE: 76 BPM

## 2024-08-05 ENCOUNTER — OFFICE VISIT (OUTPATIENT)
Age: 64
End: 2024-08-05
Payer: MEDICARE

## 2024-08-05 VITALS
HEART RATE: 69 BPM | DIASTOLIC BLOOD PRESSURE: 62 MMHG | RESPIRATION RATE: 18 BRPM | BODY MASS INDEX: 37.43 KG/M2 | SYSTOLIC BLOOD PRESSURE: 108 MMHG | TEMPERATURE: 97.5 F | WEIGHT: 276 LBS | OXYGEN SATURATION: 98 %

## 2024-08-05 DIAGNOSIS — I10 ESSENTIAL (PRIMARY) HYPERTENSION: ICD-10-CM

## 2024-08-05 DIAGNOSIS — N18.32 CHRONIC KIDNEY DISEASE, STAGE 3B (HCC): ICD-10-CM

## 2024-08-05 DIAGNOSIS — G62.9 POLYNEUROPATHY, UNSPECIFIED: ICD-10-CM

## 2024-08-05 DIAGNOSIS — Z95.2 S/P TAVR (TRANSCATHETER AORTIC VALVE REPLACEMENT): ICD-10-CM

## 2024-08-05 DIAGNOSIS — Z12.11 COLON CANCER SCREENING: ICD-10-CM

## 2024-08-05 DIAGNOSIS — E55.9 VITAMIN D DEFICIENCY: ICD-10-CM

## 2024-08-05 DIAGNOSIS — E11.42 TYPE 2 DIABETES MELLITUS WITH DIABETIC POLYNEUROPATHY, WITHOUT LONG-TERM CURRENT USE OF INSULIN (HCC): Primary | ICD-10-CM

## 2024-08-05 DIAGNOSIS — F11.90 OPIOID USE: ICD-10-CM

## 2024-08-05 DIAGNOSIS — I48.0 PAROXYSMAL ATRIAL FIBRILLATION (HCC): ICD-10-CM

## 2024-08-05 DIAGNOSIS — Z09 HOSPITAL DISCHARGE FOLLOW-UP: ICD-10-CM

## 2024-08-05 DIAGNOSIS — Z12.5 PROSTATE CANCER SCREENING: ICD-10-CM

## 2024-08-05 DIAGNOSIS — I50.32 CHRONIC HEART FAILURE WITH PRESERVED EJECTION FRACTION (HCC): ICD-10-CM

## 2024-08-05 DIAGNOSIS — E78.2 MIXED HYPERLIPIDEMIA: ICD-10-CM

## 2024-08-05 LAB — HBA1C MFR BLD: 6.1 %

## 2024-08-05 PROCEDURE — 36415 COLL VENOUS BLD VENIPUNCTURE: CPT

## 2024-08-05 PROCEDURE — 3017F COLORECTAL CA SCREEN DOC REV: CPT

## 2024-08-05 PROCEDURE — 83036 HEMOGLOBIN GLYCOSYLATED A1C: CPT

## 2024-08-05 PROCEDURE — 1036F TOBACCO NON-USER: CPT

## 2024-08-05 PROCEDURE — 1111F DSCHRG MED/CURRENT MED MERGE: CPT

## 2024-08-05 PROCEDURE — 2022F DILAT RTA XM EVC RTNOPTHY: CPT

## 2024-08-05 PROCEDURE — G8427 DOCREV CUR MEDS BY ELIG CLIN: HCPCS

## 2024-08-05 PROCEDURE — 3046F HEMOGLOBIN A1C LEVEL >9.0%: CPT

## 2024-08-05 PROCEDURE — 3074F SYST BP LT 130 MM HG: CPT

## 2024-08-05 PROCEDURE — G8417 CALC BMI ABV UP PARAM F/U: HCPCS

## 2024-08-05 PROCEDURE — 1123F ACP DISCUSS/DSCN MKR DOCD: CPT

## 2024-08-05 PROCEDURE — 3078F DIAST BP <80 MM HG: CPT

## 2024-08-05 PROCEDURE — 99215 OFFICE O/P EST HI 40 MIN: CPT

## 2024-08-05 ASSESSMENT — PATIENT HEALTH QUESTIONNAIRE - PHQ9
7. TROUBLE CONCENTRATING ON THINGS, SUCH AS READING THE NEWSPAPER OR WATCHING TELEVISION: NOT AT ALL
10. IF YOU CHECKED OFF ANY PROBLEMS, HOW DIFFICULT HAVE THESE PROBLEMS MADE IT FOR YOU TO DO YOUR WORK, TAKE CARE OF THINGS AT HOME, OR GET ALONG WITH OTHER PEOPLE: NOT DIFFICULT AT ALL
3. TROUBLE FALLING OR STAYING ASLEEP: NEARLY EVERY DAY
8. MOVING OR SPEAKING SO SLOWLY THAT OTHER PEOPLE COULD HAVE NOTICED. OR THE OPPOSITE, BEING SO FIGETY OR RESTLESS THAT YOU HAVE BEEN MOVING AROUND A LOT MORE THAN USUAL: NOT AT ALL
2. FEELING DOWN, DEPRESSED OR HOPELESS: MORE THAN HALF THE DAYS
SUM OF ALL RESPONSES TO PHQ QUESTIONS 1-9: 10
SUM OF ALL RESPONSES TO PHQ9 QUESTIONS 1 & 2: 4
9. THOUGHTS THAT YOU WOULD BE BETTER OFF DEAD, OR OF HURTING YOURSELF: NOT AT ALL
SUM OF ALL RESPONSES TO PHQ QUESTIONS 1-9: 10
1. LITTLE INTEREST OR PLEASURE IN DOING THINGS: MORE THAN HALF THE DAYS
4. FEELING TIRED OR HAVING LITTLE ENERGY: NEARLY EVERY DAY
5. POOR APPETITE OR OVEREATING: NOT AT ALL
SUM OF ALL RESPONSES TO PHQ QUESTIONS 1-9: 10
SUM OF ALL RESPONSES TO PHQ QUESTIONS 1-9: 10
6. FEELING BAD ABOUT YOURSELF - OR THAT YOU ARE A FAILURE OR HAVE LET YOURSELF OR YOUR FAMILY DOWN: NOT AT ALL

## 2024-08-05 ASSESSMENT — ENCOUNTER SYMPTOMS
BLOOD IN STOOL: 0
RESPIRATORY NEGATIVE: 1
EYES NEGATIVE: 1
DIARRHEA: 0
ALLERGIC/IMMUNOLOGIC NEGATIVE: 1
CONSTIPATION: 0
WHEEZING: 0
COUGH: 0
SHORTNESS OF BREATH: 0

## 2024-08-05 ASSESSMENT — LIFESTYLE VARIABLES: HOW OFTEN DO YOU HAVE A DRINK CONTAINING ALCOHOL: PATIENT DECLINED

## 2024-08-05 NOTE — PROGRESS NOTES
Chief Complaint   Patient presents with    Follow-Up from Hospital    Weight Loss    Memory Loss    Fatigue       HPI:     is a 64 y.o. male who presents for hospital follow-up.      He reports episodes of low blood pressure over the past two weeks for which he was seen at [facility] ER; he notes some fatigue for this period of time, but denies dizziness, syncope, confusion, or other related symptoms.  Workup at [facility] was significant for elevated , CRISTINA creatinine 3.07 (2.32 on 2/9/24), normal CXR; his BP was normal throughout the visit and he was discharged home without changes to his medication.  He notes that BP this morning before medicine was elevated 160s/90s.    He also notes 10 pound weight loss over the past week; has been working with his son out in the head and feels tired when the day is done.  He is being treated neck pain with recent MRI showing severe multilevel cervical DDD and neuroforaminal narrowing.  This is managed by Amanda Villarreal NP who is prescribing hydrocodone 7.5mg; Mr Ruelas tells me that he is taking only one of these a day and that he has only had this prescription filled \"once\", though PDMP shows that he has received 270 hydrocodone pills since 6/4/24, most recently #90 filled on 7/31.  In addition, he has received gabapentin 600mg #90 prescribed by NANCY Armas in addition to his regularly prescribed gabapentin dose.    Allergies   Allergen Reactions    Soap Hives     Patient is allergic to Sween body lotion, applied on face twice during hospital stay, with extensive erythema on face. Improving with topical steroids.   Patient is allergic to Sween body lotion, applied on face twice during hospital stay, with extensive erythema on face. Improving with topical steroids.     Sulfamethoxazole-Trimethoprim Rash     Pt states rash on left arm developed, \"pretty severe\". Pt states Dr told him not to take this anymore.    Ceftriaxone Dermatitis    Vancomycin Dermatitis       Current

## 2024-08-05 NOTE — PROGRESS NOTES
\"Have you been to the ER, urgent care clinic since your last visit?  Hospitalized since your last visit?\"    NO    “Have you seen or consulted any other health care providers outside of [facility] since your last visit?”    NO    “Have you had a colorectal cancer screening such as a colonoscopy/FIT/Cologuard?    NO    No colonoscopy on file  No cologuard on file  No FIT/FOBT on file   No flexible sigmoidoscopy on file       Chief Complaint   Patient presents with    Follow-Up from Hospital    Weight Loss    Memory Loss    Fatigue       Vitals:    08/05/24 1319   BP: 108/62   Pulse: 69   Resp: 18   Temp: 97.5 °F (36.4 °C)   SpO2: 98%     Click Here for Release of Records Request    Labs drawn from right arm per Rayna Trimble NP's orders. Patient tolerated well.

## 2024-08-06 LAB
25(OH)D3 SERPL-MCNC: 30.3 NG/ML (ref 30–100)
ANION GAP SERPL CALC-SCNC: 2 MMOL/L (ref 5–15)
BUN SERPL-MCNC: 25 MG/DL (ref 6–20)
BUN/CREAT SERPL: 16 (ref 12–20)
CALCIUM SERPL-MCNC: 9.2 MG/DL (ref 8.5–10.1)
CHLORIDE SERPL-SCNC: 109 MMOL/L (ref 97–108)
CHOLEST SERPL-MCNC: 113 MG/DL
CO2 SERPL-SCNC: 30 MMOL/L (ref 21–32)
CREAT SERPL-MCNC: 1.53 MG/DL (ref 0.7–1.3)
CREAT UR-MCNC: 143 MG/DL
GLUCOSE SERPL-MCNC: 91 MG/DL (ref 65–100)
HDLC SERPL-MCNC: 44 MG/DL
HDLC SERPL: 2.6 (ref 0–5)
LDLC SERPL CALC-MCNC: 52.4 MG/DL (ref 0–100)
MICROALBUMIN UR-MCNC: 10.7 MG/DL
MICROALBUMIN/CREAT UR-RTO: 75 MG/G (ref 0–30)
POTASSIUM SERPL-SCNC: 4.8 MMOL/L (ref 3.5–5.1)
PSA SERPL-MCNC: 0.2 NG/ML (ref 0.01–4)
SODIUM SERPL-SCNC: 141 MMOL/L (ref 136–145)
TRIGL SERPL-MCNC: 83 MG/DL
TSH SERPL DL<=0.05 MIU/L-ACNC: 3.35 UIU/ML (ref 0.36–3.74)
VLDLC SERPL CALC-MCNC: 16.6 MG/DL

## 2024-09-03 DIAGNOSIS — I48.0 PAROXYSMAL ATRIAL FIBRILLATION (HCC): ICD-10-CM

## 2024-09-03 DIAGNOSIS — M25.511 CHRONIC RIGHT SHOULDER PAIN: ICD-10-CM

## 2024-09-03 DIAGNOSIS — G89.29 CHRONIC RIGHT SHOULDER PAIN: ICD-10-CM

## 2024-09-05 RX ORDER — AMIODARONE HYDROCHLORIDE 200 MG/1
TABLET ORAL
Qty: 90 TABLET | Refills: 0 | Status: SHIPPED | OUTPATIENT
Start: 2024-09-05

## 2024-10-02 DIAGNOSIS — M25.511 CHRONIC RIGHT SHOULDER PAIN: ICD-10-CM

## 2024-10-02 DIAGNOSIS — G89.29 CHRONIC RIGHT SHOULDER PAIN: ICD-10-CM

## 2024-10-02 DIAGNOSIS — E78.2 MIXED HYPERLIPIDEMIA: ICD-10-CM

## 2024-10-02 RX ORDER — ATORVASTATIN CALCIUM 40 MG/1
40 TABLET, FILM COATED ORAL DAILY
Qty: 90 TABLET | Refills: 1 | Status: SHIPPED | OUTPATIENT
Start: 2024-10-02

## 2024-10-03 DIAGNOSIS — I48.0 PAROXYSMAL ATRIAL FIBRILLATION (HCC): ICD-10-CM

## 2024-10-03 DIAGNOSIS — E11.42 TYPE 2 DIABETES MELLITUS WITH DIABETIC POLYNEUROPATHY, WITHOUT LONG-TERM CURRENT USE OF INSULIN (HCC): ICD-10-CM

## 2024-10-03 RX ORDER — AMIODARONE HYDROCHLORIDE 200 MG/1
TABLET ORAL
Qty: 90 TABLET | Refills: 0 | OUTPATIENT
Start: 2024-10-03

## 2024-10-03 NOTE — TELEPHONE ENCOUNTER
Medication Refill Request    Job Ruelas is requesting a refill of the following medication(s):     amiodarone (CORDARONE) 200 MG tablet      JARDIANCE 25 MG tablet     Please send refill to:       [facility] - [address] [facility] [address] - P [phone] - F [phone] [address] [place] [address]  [place] [address]  Phone: [phone] Fax: [phone]

## 2024-10-18 ENCOUNTER — OFFICE VISIT (OUTPATIENT)
Age: 64
End: 2024-10-18

## 2024-10-18 VITALS
WEIGHT: 267 LBS | SYSTOLIC BLOOD PRESSURE: 118 MMHG | HEART RATE: 76 BPM | BODY MASS INDEX: 36.21 KG/M2 | TEMPERATURE: 97.9 F | DIASTOLIC BLOOD PRESSURE: 80 MMHG | OXYGEN SATURATION: 100 % | RESPIRATION RATE: 20 BRPM

## 2024-10-18 DIAGNOSIS — E11.621 DIABETIC ULCER OF RIGHT MIDFOOT ASSOCIATED WITH TYPE 2 DIABETES MELLITUS, LIMITED TO BREAKDOWN OF SKIN (HCC): Primary | ICD-10-CM

## 2024-10-18 DIAGNOSIS — Z23 NEEDS FLU SHOT: ICD-10-CM

## 2024-10-18 DIAGNOSIS — L97.411 DIABETIC ULCER OF RIGHT MIDFOOT ASSOCIATED WITH TYPE 2 DIABETES MELLITUS, LIMITED TO BREAKDOWN OF SKIN (HCC): Primary | ICD-10-CM

## 2024-10-18 RX ORDER — HYDROCODONE BITARTRATE AND ACETAMINOPHEN 10; 325 MG/1; MG/1
TABLET ORAL
COMMUNITY
Start: 2024-10-02

## 2024-10-18 NOTE — PROGRESS NOTES
\"Have you been to the ER, urgent care clinic since your last visit?  Hospitalized since your last visit?\"    NO    “Have you seen or consulted any other health care providers outside our system since your last visit?”    [facility]       “Have you had a colorectal cancer screening such as a colonoscopy/FIT/Cologuard?    NO    No colonoscopy on file  No cologuard on file  No FIT/FOBT on file   No flexible sigmoidoscopy on file     “Have you had a diabetic eye exam?”    NO     No diabetic eye exam on file     Chief Complaint   Patient presents with    Wound Check     Bottom of left foot noticed 3 days ago        Vitals:    10/18/24 1405   BP: 118/80   Pulse: 76   Resp: 20   Temp: 97.9 °F (36.6 °C)   SpO2: 100%       Patient was administered Flu shot in left deltoid via IM.  Patient tolerated Flu shot well.  Medication information reviewed with patient, patient states understanding. Patient to resume routine medications at home.  Patient given copy of AVS and VIIS with medication information and instructions for home. VIIS reviewed with patient and patient states understanding.

## 2024-10-19 ASSESSMENT — ENCOUNTER SYMPTOMS
SHORTNESS OF BREATH: 0
EYES NEGATIVE: 1
BLOOD IN STOOL: 0
WHEEZING: 0
COUGH: 0
DIARRHEA: 0
CONSTIPATION: 0
RESPIRATORY NEGATIVE: 1
ALLERGIC/IMMUNOLOGIC NEGATIVE: 1

## 2024-11-03 DIAGNOSIS — M25.511 CHRONIC RIGHT SHOULDER PAIN: ICD-10-CM

## 2024-11-03 DIAGNOSIS — G89.29 CHRONIC RIGHT SHOULDER PAIN: ICD-10-CM

## 2024-11-04 NOTE — TELEPHONE ENCOUNTER
Patient requesting refill on     Requested Prescriptions     Pending Prescriptions Disp Refills    diclofenac sodium (VOLTAREN) 1 % GEL [Pharmacy Med Name: DICLOFENAC SODIUM 1% GEL] 300 g 0     Sig: APPLY 2 G TOPICALLY 4 TIMES A DAY        Last OV 10/18/2024

## 2024-11-19 DIAGNOSIS — I48.0 PAROXYSMAL ATRIAL FIBRILLATION (HCC): ICD-10-CM

## 2024-11-19 RX ORDER — AMIODARONE HYDROCHLORIDE 200 MG/1
TABLET ORAL
Qty: 90 TABLET | Refills: 0 | Status: SHIPPED | OUTPATIENT
Start: 2024-11-19

## 2024-11-25 ENCOUNTER — OFFICE VISIT (OUTPATIENT)
Age: 64
End: 2024-11-25
Payer: MEDICARE

## 2024-11-25 VITALS
BODY MASS INDEX: 36.48 KG/M2 | HEART RATE: 73 BPM | OXYGEN SATURATION: 100 % | RESPIRATION RATE: 18 BRPM | TEMPERATURE: 97.6 F | DIASTOLIC BLOOD PRESSURE: 66 MMHG | WEIGHT: 269 LBS | SYSTOLIC BLOOD PRESSURE: 128 MMHG

## 2024-11-25 DIAGNOSIS — L85.3 DRY SKIN: ICD-10-CM

## 2024-11-25 DIAGNOSIS — R05.2 SUBACUTE COUGH: Primary | ICD-10-CM

## 2024-11-25 PROCEDURE — G8427 DOCREV CUR MEDS BY ELIG CLIN: HCPCS

## 2024-11-25 PROCEDURE — G8417 CALC BMI ABV UP PARAM F/U: HCPCS

## 2024-11-25 PROCEDURE — 3017F COLORECTAL CA SCREEN DOC REV: CPT

## 2024-11-25 PROCEDURE — G8482 FLU IMMUNIZE ORDER/ADMIN: HCPCS

## 2024-11-25 PROCEDURE — 1036F TOBACCO NON-USER: CPT

## 2024-11-25 PROCEDURE — 1123F ACP DISCUSS/DSCN MKR DOCD: CPT

## 2024-11-25 PROCEDURE — 99213 OFFICE O/P EST LOW 20 MIN: CPT

## 2024-11-25 RX ORDER — BENZONATATE 100 MG/1
100 CAPSULE ORAL 3 TIMES DAILY PRN
Qty: 30 CAPSULE | Refills: 0 | Status: SHIPPED | OUTPATIENT
Start: 2024-11-25 | End: 2024-12-05

## 2024-11-25 SDOH — ECONOMIC STABILITY: FOOD INSECURITY: WITHIN THE PAST 12 MONTHS, THE FOOD YOU BOUGHT JUST DIDN'T LAST AND YOU DIDN'T HAVE MONEY TO GET MORE.: NEVER TRUE

## 2024-11-25 SDOH — ECONOMIC STABILITY: FOOD INSECURITY: WITHIN THE PAST 12 MONTHS, YOU WORRIED THAT YOUR FOOD WOULD RUN OUT BEFORE YOU GOT MONEY TO BUY MORE.: NEVER TRUE

## 2024-11-25 SDOH — ECONOMIC STABILITY: INCOME INSECURITY: HOW HARD IS IT FOR YOU TO PAY FOR THE VERY BASICS LIKE FOOD, HOUSING, MEDICAL CARE, AND HEATING?: NOT HARD AT ALL

## 2024-11-25 ASSESSMENT — ENCOUNTER SYMPTOMS
SHORTNESS OF BREATH: 0
COUGH: 1
WHEEZING: 0
CHEST TIGHTNESS: 0

## 2024-11-25 NOTE — PROGRESS NOTES
Chief Complaint   Patient presents with    Cough     Chest congestion x 3 wks        HPI:     is a 64 y.o. male who presents for an acute visit.      He endorses cough that began about three weeks ago; initially also had cold symptoms, along with the rest of his family, but these symptoms resolved.  He denies SOB, wheezing, fever, chills, leg swelling.      He also notes dry, itchy skin over the past few weeks and wonders if he has scabies again.    Allergies   Allergen Reactions    Soap Hives     Patient is allergic to Sween body lotion, applied on face twice during hospital stay, with extensive erythema on face. Improving with topical steroids.   Patient is allergic to Sween body lotion, applied on face twice during hospital stay, with extensive erythema on face. Improving with topical steroids.     Sulfamethoxazole-Trimethoprim Rash     Pt states rash on left arm developed, \"pretty severe\". Pt states Dr told him not to take this anymore.    Ceftriaxone Dermatitis    Vancomycin Dermatitis       Current Outpatient Medications   Medication Sig Dispense Refill    benzonatate (TESSALON) 100 MG capsule Take 1 capsule by mouth 3 times daily as needed for Cough 30 capsule 0    amiodarone (CORDARONE) 200 MG tablet TAKE ONE TABLET BY MOUTH DAILY AT 9AM 90 tablet 0    diclofenac sodium (VOLTAREN) 1 % GEL APPLY 2 G TOPICALLY 4 TIMES A  g 0    HYDROcodone-acetaminophen (NORCO)  MG per tablet TAKE 1/2 PILL EVERY 6 HOURS FOR PAIN RELIEF. DO NOT EXCEED 2 WHOLE PILLS A DAY      empagliflozin (JARDIANCE) 25 MG tablet Take 1 tablet by mouth daily 90 tablet 0    atorvastatin (LIPITOR) 40 MG tablet TAKE 1 TABLET BY MOUTH EVERY DAY 90 tablet 1    gabapentin (NEURONTIN) 300 MG capsule Take 2 capsules by mouth 3 times daily for 90 days. Max Daily Amount: 1,800 mg 540 capsule 0    traZODone (DESYREL) 50 MG tablet TAKE ONE TABLET BY MOUTH DAILY AT 9PM NIGHTLY 30 tablet 11    valsartan (DIOVAN) 80 MG tablet Take 1

## 2024-11-25 NOTE — PROGRESS NOTES
\"Have you been to the ER, urgent care clinic since your last visit?  Hospitalized since your last visit?\"    NO    “Have you seen or consulted any other health care providers outside our system since your last visit?”    Podiatrist Dr Barcenas   Spine Dr Schofield       “Have you had a colorectal cancer screening such as a colonoscopy/FIT/Cologuard?    NO    No colonoscopy on file  No cologuard on file  No FIT/FOBT on file   No flexible sigmoidoscopy on file     “Have you had a diabetic eye exam?”    NO     No diabetic eye exam on file

## 2024-12-16 ENCOUNTER — OFFICE VISIT (OUTPATIENT)
Age: 64
End: 2024-12-16
Payer: MEDICARE

## 2024-12-16 VITALS
HEIGHT: 72 IN | WEIGHT: 270 LBS | OXYGEN SATURATION: 100 % | RESPIRATION RATE: 18 BRPM | DIASTOLIC BLOOD PRESSURE: 66 MMHG | BODY MASS INDEX: 36.57 KG/M2 | SYSTOLIC BLOOD PRESSURE: 130 MMHG | HEART RATE: 75 BPM

## 2024-12-16 DIAGNOSIS — Z00.00 MEDICARE ANNUAL WELLNESS VISIT, SUBSEQUENT: Primary | ICD-10-CM

## 2024-12-16 DIAGNOSIS — N18.32 CHRONIC KIDNEY DISEASE, STAGE 3B (HCC): ICD-10-CM

## 2024-12-16 DIAGNOSIS — I48.0 PAROXYSMAL ATRIAL FIBRILLATION (HCC): ICD-10-CM

## 2024-12-16 DIAGNOSIS — E11.42 TYPE 2 DIABETES MELLITUS WITH DIABETIC POLYNEUROPATHY, WITHOUT LONG-TERM CURRENT USE OF INSULIN (HCC): ICD-10-CM

## 2024-12-16 DIAGNOSIS — Z23 NEED FOR PROPHYLACTIC VACCINATION AGAINST STREPTOCOCCUS PNEUMONIAE (PNEUMOCOCCUS): ICD-10-CM

## 2024-12-16 DIAGNOSIS — G89.29 CHRONIC RIGHT SHOULDER PAIN: ICD-10-CM

## 2024-12-16 DIAGNOSIS — M25.511 CHRONIC RIGHT SHOULDER PAIN: ICD-10-CM

## 2024-12-16 DIAGNOSIS — E11.621 DIABETIC ULCER OF RIGHT MIDFOOT ASSOCIATED WITH TYPE 2 DIABETES MELLITUS, WITH MUSCLE INVOLVEMENT WITHOUT EVIDENCE OF NECROSIS (HCC): ICD-10-CM
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
## 2024-12-16 DIAGNOSIS — I10 ESSENTIAL (PRIMARY) HYPERTENSION: ICD-10-CM

## 2024-12-16 DIAGNOSIS — L97.415 DIABETIC ULCER OF RIGHT MIDFOOT ASSOCIATED WITH TYPE 2 DIABETES MELLITUS, WITH MUSCLE INVOLVEMENT WITHOUT EVIDENCE OF NECROSIS (HCC): ICD-10-CM

## 2024-12-16 DIAGNOSIS — Z95.2 S/P TAVR (TRANSCATHETER AORTIC VALVE REPLACEMENT): ICD-10-CM

## 2024-12-16 LAB
ANION GAP SERPL CALC-SCNC: 6 MMOL/L (ref 2–12)
BUN SERPL-MCNC: 37 MG/DL (ref 6–20)
BUN/CREAT SERPL: 18 (ref 12–20)
CALCIUM SERPL-MCNC: 8.7 MG/DL (ref 8.5–10.1)
CHLORIDE SERPL-SCNC: 107 MMOL/L (ref 97–108)
CO2 SERPL-SCNC: 26 MMOL/L (ref 21–32)
CREAT SERPL-MCNC: 2.08 MG/DL (ref 0.7–1.3)
GLUCOSE SERPL-MCNC: 107 MG/DL (ref 65–100)
HBA1C MFR BLD: 5.4 %
POTASSIUM SERPL-SCNC: 5.1 MMOL/L (ref 3.5–5.1)
SODIUM SERPL-SCNC: 139 MMOL/L (ref 136–145)

## 2024-12-16 PROCEDURE — G8427 DOCREV CUR MEDS BY ELIG CLIN: HCPCS

## 2024-12-16 PROCEDURE — 36415 COLL VENOUS BLD VENIPUNCTURE: CPT

## 2024-12-16 PROCEDURE — 3046F HEMOGLOBIN A1C LEVEL >9.0%: CPT

## 2024-12-16 PROCEDURE — 99213 OFFICE O/P EST LOW 20 MIN: CPT

## 2024-12-16 PROCEDURE — 2022F DILAT RTA XM EVC RTNOPTHY: CPT

## 2024-12-16 PROCEDURE — 83036 HEMOGLOBIN GLYCOSYLATED A1C: CPT

## 2024-12-16 PROCEDURE — 1123F ACP DISCUSS/DSCN MKR DOCD: CPT

## 2024-12-16 PROCEDURE — G8417 CALC BMI ABV UP PARAM F/U: HCPCS

## 2024-12-16 PROCEDURE — G0439 PPPS, SUBSEQ VISIT: HCPCS

## 2024-12-16 PROCEDURE — 1159F MED LIST DOCD IN RCRD: CPT

## 2024-12-16 PROCEDURE — 3078F DIAST BP <80 MM HG: CPT

## 2024-12-16 PROCEDURE — 3017F COLORECTAL CA SCREEN DOC REV: CPT

## 2024-12-16 PROCEDURE — 1036F TOBACCO NON-USER: CPT

## 2024-12-16 PROCEDURE — G0009 ADMIN PNEUMOCOCCAL VACCINE: HCPCS

## 2024-12-16 PROCEDURE — 1160F RVW MEDS BY RX/DR IN RCRD: CPT

## 2024-12-16 PROCEDURE — 3075F SYST BP GE 130 - 139MM HG: CPT

## 2024-12-16 PROCEDURE — 90677 PCV20 VACCINE IM: CPT

## 2024-12-16 PROCEDURE — 1125F AMNT PAIN NOTED PAIN PRSNT: CPT

## 2024-12-16 PROCEDURE — G8482 FLU IMMUNIZE ORDER/ADMIN: HCPCS

## 2024-12-16 ASSESSMENT — PATIENT HEALTH QUESTIONNAIRE - PHQ9
SUM OF ALL RESPONSES TO PHQ QUESTIONS 1-9: 0
2. FEELING DOWN, DEPRESSED OR HOPELESS: NOT AT ALL
1. LITTLE INTEREST OR PLEASURE IN DOING THINGS: NOT AT ALL
SUM OF ALL RESPONSES TO PHQ9 QUESTIONS 1 & 2: 0

## 2024-12-16 NOTE — PROGRESS NOTES
Job Ruelas is a 64 y.o. male presenting for/with:    Chief Complaint   Patient presents with    Medicare AWV    Shoulder Pain     C/O fall this AM with C/O (R) shoulder unable to lift against gravity    Foot Pain     States foot wound is getting worse.        There were no vitals filed for this visit.    Pain Scale: /10  Pain Location:     \"Have you been to the ER, urgent care clinic since your last visit?  Hospitalized since your last visit?\"    NO    “Have you seen or consulted any other health care providers outside of [facility] since your last visit?”    NO    “Have you had a colorectal cancer screening such as a colonoscopy/FIT/Cologuard?    NO    No colonoscopy on file  No cologuard on file  No FIT/FOBT on file   No flexible sigmoidoscopy on file                  12/16/2024    10:38 AM   PHQ-9    Little interest or pleasure in doing things 0   Feeling down, depressed, or hopeless 0   PHQ-2 Score 0   PHQ-9 Total Score 0           1/9/2023    12:00 AM   [facility] AMB LEARNING ASSESSMENT   Primary Learner Patient   level of education GRADUATED HIGH SCHOOL OR GED   Barriers Factors NONE   co-learner caregiver No   Primary Language ENGLISH   Learning Preference READING   Answered By Patient   Relationship to Learner SELF            12/16/2024    10:39 AM   Amb Fall Risk Assessment and TUG Test   Do you feel unsteady or are you worried about falling?  yes   2 or more falls in past year? no   Fall with injury in past year? no           12/16/2024    10:00 AM   ADL ASSESSMENT   Feeding yourself No Help Needed   Getting from bed to chair No Help Needed   Getting dressed No Help Needed   Bathing or showering No Help Needed   Walk across the room (includes cane/walker) No Help Needed   Using the telphone No Help Needed   Taking your medications No Help Needed   Preparing meals No Help Needed   Managing money (expenses/bills) No Help Needed   Moderately strenuous housework (laundry) No Help Needed   Shopping 
  Eyes:      Extraocular Movements: Extraocular movements intact.      Conjunctiva/sclera: Conjunctivae normal.      Pupils: Pupils are equal, round, and reactive to light.   Neck:      Vascular: No carotid bruit.   Cardiovascular:      Rate and Rhythm: Normal rate and regular rhythm.      Pulses: Normal pulses.      Heart sounds: Normal heart sounds. No murmur heard.     No friction rub. No gallop.   Pulmonary:      Effort: Pulmonary effort is normal.      Breath sounds: Normal breath sounds. No wheezing, rhonchi or rales.   Abdominal:      General: Bowel sounds are normal.      Palpations: Abdomen is soft.   Musculoskeletal:         General: Normal range of motion.      Cervical back: Normal range of motion and neck supple.        Feet:    Feet:      Comments: 1cm x 1cm x 4mm stage 1 ulcer with clean wound bed with superficial dermis involvement only; no erythema, drainage  Lymphadenopathy:      Cervical: No cervical adenopathy.   Skin:     General: Skin is warm and dry.   Neurological:      General: No focal deficit present.      Mental Status: He is alert and oriented to person, place, and time.   Psychiatric:         Mood and Affect: Mood normal.         Behavior: Behavior normal.         Thought Content: Thought content normal.         Judgment: Judgment normal.                 Allergies   Allergen Reactions    Soap Hives     Patient is allergic to Sween body lotion, applied on face twice during hospital stay, with extensive erythema on face. Improving with topical steroids.     Sulfamethoxazole-Trimethoprim Rash     Pt states rash on left arm developed, \"pretty severe\". Pt states Dr told him not to take this anymore.    Ceftriaxone Dermatitis and Rash    Vancomycin Dermatitis and Rash     Prior to Visit Medications    Medication Sig Taking? Authorizing Provider   amiodarone (CORDARONE) 200 MG tablet TAKE ONE TABLET BY MOUTH DAILY AT 9AM  Rochelle Delgadillo APRN - NP   diclofenac sodium (VOLTAREN) 1 % GEL

## 2024-12-19 ENCOUNTER — HOSPITAL ENCOUNTER (OUTPATIENT)
Facility: HOSPITAL | Age: 64
Discharge: HOME | End: 2024-12-22
Payer: MEDICARE

## 2024-12-19 ENCOUNTER — TRANSCRIBE ORDERS (OUTPATIENT)
Facility: HOSPITAL | Age: 64
End: 2024-12-19

## 2024-12-19 DIAGNOSIS — E11.42 DIABETIC SENSORIMOTOR NEUROPATHY (HCC): ICD-10-CM
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
## 2024-12-19 DIAGNOSIS — M21.6X2 ACQUIRED HEEL VARUS, LEFT: ICD-10-CM

## 2024-12-19 DIAGNOSIS — L97.522 ULCER OF LEFT FOOT, WITH FAT LAYER EXPOSED (HCC): ICD-10-CM

## 2024-12-19 DIAGNOSIS — M25.511 ACUTE PAIN OF RIGHT SHOULDER: ICD-10-CM

## 2024-12-19 DIAGNOSIS — I48.0 PAROXYSMAL ATRIAL FIBRILLATION (HCC): ICD-10-CM

## 2024-12-19 DIAGNOSIS — L97.522 ULCER OF LEFT FOOT, WITH FAT LAYER EXPOSED (HCC): Primary | ICD-10-CM

## 2024-12-19 PROCEDURE — 73030 X-RAY EXAM OF SHOULDER: CPT

## 2024-12-19 PROCEDURE — 73630 X-RAY EXAM OF FOOT: CPT

## 2024-12-19 RX ORDER — AMIODARONE HYDROCHLORIDE 200 MG/1
TABLET ORAL
Qty: 90 TABLET | Refills: 0 | Status: SHIPPED | OUTPATIENT
Start: 2024-12-19

## 2024-12-26 DIAGNOSIS — E11.42 TYPE 2 DIABETES MELLITUS WITH DIABETIC POLYNEUROPATHY, WITHOUT LONG-TERM CURRENT USE OF INSULIN (HCC): ICD-10-CM

## 2024-12-26 RX ORDER — EMPAGLIFLOZIN 25 MG/1
TABLET, FILM COATED ORAL
Qty: 90 TABLET | Refills: 0 | Status: SHIPPED | OUTPATIENT
Start: 2024-12-26

## 2025-01-21 ENCOUNTER — LAB (OUTPATIENT)
Age: 65
End: 2025-01-21

## 2025-01-21 DIAGNOSIS — N18.32 CHRONIC KIDNEY DISEASE, STAGE 3B (HCC): Primary | ICD-10-CM

## 2025-01-22 LAB
ANION GAP SERPL CALC-SCNC: 7 MMOL/L (ref 2–12)
BUN SERPL-MCNC: 23 MG/DL (ref 6–20)
BUN/CREAT SERPL: 13 (ref 12–20)
CALCIUM SERPL-MCNC: 9.8 MG/DL (ref 8.5–10.1)
CHLORIDE SERPL-SCNC: 100 MMOL/L (ref 97–108)
CO2 SERPL-SCNC: 28 MMOL/L (ref 21–32)
CREAT SERPL-MCNC: 1.72 MG/DL (ref 0.7–1.3)
GLUCOSE SERPL-MCNC: 76 MG/DL (ref 65–100)
POTASSIUM SERPL-SCNC: 4.8 MMOL/L (ref 3.5–5.1)
SODIUM SERPL-SCNC: 135 MMOL/L (ref 136–145)

## 2025-01-23 DIAGNOSIS — E11.42 TYPE 2 DIABETES MELLITUS WITH DIABETIC POLYNEUROPATHY, WITHOUT LONG-TERM CURRENT USE OF INSULIN (HCC): ICD-10-CM

## 2025-01-23 DIAGNOSIS — I10 ESSENTIAL (PRIMARY) HYPERTENSION: ICD-10-CM

## 2025-01-23 DIAGNOSIS — N18.32 CHRONIC KIDNEY DISEASE, STAGE 3B (HCC): Primary | ICD-10-CM

## 2025-01-24 ENCOUNTER — TELEPHONE (OUTPATIENT)
Age: 65
End: 2025-01-24

## 2025-01-24 NOTE — TELEPHONE ENCOUNTER
Job is returning a call about his lab results.   
Alert-The patient is alert, awake and responds to voice. The patient is oriented to time, place, and person. The triage nurse is able to obtain subjective information.

## 2025-01-28 ENCOUNTER — OFFICE VISIT (OUTPATIENT)
Age: 65
End: 2025-01-28

## 2025-01-28 VITALS
OXYGEN SATURATION: 98 % | WEIGHT: 262 LBS | SYSTOLIC BLOOD PRESSURE: 148 MMHG | RESPIRATION RATE: 18 BRPM | BODY MASS INDEX: 35.53 KG/M2 | DIASTOLIC BLOOD PRESSURE: 74 MMHG | HEART RATE: 70 BPM | TEMPERATURE: 97.7 F

## 2025-01-28 DIAGNOSIS — M19.011 PRIMARY OSTEOARTHRITIS OF RIGHT SHOULDER: ICD-10-CM

## 2025-01-28 DIAGNOSIS — E11.621 DIABETIC ULCER OF RIGHT MIDFOOT ASSOCIATED WITH TYPE 2 DIABETES MELLITUS, WITH MUSCLE INVOLVEMENT WITHOUT EVIDENCE OF NECROSIS (HCC): Primary | ICD-10-CM

## 2025-01-28 DIAGNOSIS — L97.415 DIABETIC ULCER OF RIGHT MIDFOOT ASSOCIATED WITH TYPE 2 DIABETES MELLITUS, WITH MUSCLE INVOLVEMENT WITHOUT EVIDENCE OF NECROSIS (HCC): Primary | ICD-10-CM

## 2025-01-28 SDOH — ECONOMIC STABILITY: FOOD INSECURITY: WITHIN THE PAST 12 MONTHS, THE FOOD YOU BOUGHT JUST DIDN'T LAST AND YOU DIDN'T HAVE MONEY TO GET MORE.: NEVER TRUE

## 2025-01-28 SDOH — ECONOMIC STABILITY: FOOD INSECURITY: WITHIN THE PAST 12 MONTHS, YOU WORRIED THAT YOUR FOOD WOULD RUN OUT BEFORE YOU GOT MONEY TO BUY MORE.: NEVER TRUE

## 2025-01-28 ASSESSMENT — PATIENT HEALTH QUESTIONNAIRE - PHQ9
SUM OF ALL RESPONSES TO PHQ QUESTIONS 1-9: 0
1. LITTLE INTEREST OR PLEASURE IN DOING THINGS: NOT AT ALL
SUM OF ALL RESPONSES TO PHQ9 QUESTIONS 1 & 2: 0
SUM OF ALL RESPONSES TO PHQ QUESTIONS 1-9: 0
SUM OF ALL RESPONSES TO PHQ QUESTIONS 1-9: 0
2. FEELING DOWN, DEPRESSED OR HOPELESS: NOT AT ALL
SUM OF ALL RESPONSES TO PHQ QUESTIONS 1-9: 0

## 2025-01-28 ASSESSMENT — ENCOUNTER SYMPTOMS
SHORTNESS OF BREATH: 0
EYES NEGATIVE: 1
ALLERGIC/IMMUNOLOGIC NEGATIVE: 1
WHEEZING: 0
BLOOD IN STOOL: 0
DIARRHEA: 0
COUGH: 0
CONSTIPATION: 0
RESPIRATORY NEGATIVE: 1

## 2025-01-28 NOTE — PATIENT INSTRUCTIONS
Dr. Key--orthopedist for your shoulder  [phone]    Wound Specialist  [phone]    Dr. Mccallum--kidney specialist

## 2025-01-28 NOTE — PROGRESS NOTES
\"Have you been to the ER, urgent care clinic since your last visit?  Hospitalized since your last visit?\"    NO    “Have you seen or consulted any other health care providers outside our system since your last visit?”    Dr [facility] neck and shoulder arthritis   Dr. Montenegro Podiatrist     “Have you had a colorectal cancer screening such as a colonoscopy/FIT/Cologuard?    NO    No colonoscopy on file  No cologuard on file  No FIT/FOBT on file   No flexible sigmoidoscopy on file     “Have you had a diabetic eye exam?”    NO     No diabetic eye exam on file            
depressed, or hopeless 0   PHQ-2 Score 0   PHQ-9 Total Score 0        Physical Exam  Vitals and nursing note reviewed.   Constitutional:       General: He is not in acute distress.     Appearance: Normal appearance.   HENT:      Head: Normocephalic and atraumatic.   Eyes:      Extraocular Movements: Extraocular movements intact.      Conjunctiva/sclera: Conjunctivae normal.      Pupils: Pupils are equal, round, and reactive to light.   Cardiovascular:      Rate and Rhythm: Normal rate and regular rhythm.      Pulses: Normal pulses.      Heart sounds: Normal heart sounds. No murmur heard.     No friction rub. No gallop.   Pulmonary:      Effort: Pulmonary effort is normal. No respiratory distress.      Breath sounds: Normal breath sounds. No wheezing, rhonchi or rales.   Skin:     General: Skin is warm and dry.   Neurological:      General: No focal deficit present.      Mental Status: He is alert and oriented to person, place, and time.   Psychiatric:         Mood and Affect: Mood normal.         Behavior: Behavior normal.         Thought Content: Thought content normal.         Judgment: Judgment normal.            Assessment & Plan  Diabetic ulcer of right midfoot associated with type 2 diabetes mellitus, with muscle involvement without evidence of necrosis (HCC)   Refer to wound  and home health for assistance with wound care.  [facility]    External Referral To Home Health    Primary osteoarthritis of right shoulder   Refer to ortho for E&M.  Orders:    [facility] - Derik Key MD, Orthopedic Surgery (sports medicine), [place]       Medication Side Effects and Warnings were discussed with patient: Yes  Patient Labs were reviewed:  Yes  Patient Past Records were reviewed:  Yes    Patient aware of plan of care and verbalized understanding. Questions answered. RTC PRN or sooner if needed.    On this date 1/28/2025 I have spent 30 minutes

## 2025-02-10 ENCOUNTER — HOSPITAL ENCOUNTER (OUTPATIENT)
Facility: HOSPITAL | Age: 65
Discharge: HOME | End: 2025-02-10
Attending: ORTHOPAEDIC SURGERY
Payer: MEDICARE

## 2025-02-10 VITALS
TEMPERATURE: 97.5 F | SYSTOLIC BLOOD PRESSURE: 173 MMHG | RESPIRATION RATE: 18 BRPM | HEART RATE: 65 BPM | DIASTOLIC BLOOD PRESSURE: 79 MMHG

## 2025-02-10 DIAGNOSIS — L97.522 DIABETIC ULCER OF TOE OF LEFT FOOT ASSOCIATED WITH DIABETES MELLITUS DUE TO UNDERLYING CONDITION, WITH FAT LAYER EXPOSED (HCC): Primary | ICD-10-CM

## 2025-02-10 DIAGNOSIS — E08.621 DIABETIC ULCER OF TOE OF LEFT FOOT ASSOCIATED WITH DIABETES MELLITUS DUE TO UNDERLYING CONDITION, WITH FAT LAYER EXPOSED (HCC): Primary | ICD-10-CM

## 2025-02-10 PROCEDURE — 99213 OFFICE O/P EST LOW 20 MIN: CPT

## 2025-02-10 PROCEDURE — 29445 APPL RIGID TOT CNTC LEG CAST: CPT

## 2025-02-10 RX ORDER — GINSENG 100 MG
CAPSULE ORAL PRN
Status: CANCELLED | OUTPATIENT
Start: 2025-02-10

## 2025-02-10 RX ORDER — CLOBETASOL PROPIONATE 0.5 MG/G
OINTMENT TOPICAL PRN
OUTPATIENT
Start: 2025-02-10

## 2025-02-10 RX ORDER — LIDOCAINE HYDROCHLORIDE 20 MG/ML
JELLY TOPICAL PRN
OUTPATIENT
Start: 2025-02-10

## 2025-02-10 RX ORDER — LIDOCAINE HYDROCHLORIDE 40 MG/ML
SOLUTION TOPICAL PRN
OUTPATIENT
Start: 2025-02-10

## 2025-02-10 RX ORDER — SODIUM CHLOR/HYPOCHLOROUS ACID 0.033 %
SOLUTION, IRRIGATION IRRIGATION PRN
Status: CANCELLED | OUTPATIENT
Start: 2025-02-10

## 2025-02-10 RX ORDER — NEOMYCIN/BACITRACIN/POLYMYXINB 3.5-400-5K
OINTMENT (GRAM) TOPICAL PRN
Status: CANCELLED | OUTPATIENT
Start: 2025-02-10

## 2025-02-10 RX ORDER — MUPIROCIN 20 MG/G
OINTMENT TOPICAL PRN
OUTPATIENT
Start: 2025-02-10

## 2025-02-10 RX ORDER — CLOBETASOL PROPIONATE 0.5 MG/G
OINTMENT TOPICAL PRN
Status: CANCELLED | OUTPATIENT
Start: 2025-02-10

## 2025-02-10 RX ORDER — BACITRACIN ZINC AND POLYMYXIN B SULFATE 500; 1000 [USP'U]/G; [USP'U]/G
OINTMENT TOPICAL PRN
OUTPATIENT
Start: 2025-02-10

## 2025-02-10 RX ORDER — NEOMYCIN/BACITRACIN/POLYMYXINB 3.5-400-5K
OINTMENT (GRAM) TOPICAL PRN
OUTPATIENT
Start: 2025-02-10

## 2025-02-10 RX ORDER — BETAMETHASONE DIPROPIONATE 0.5 MG/G
CREAM TOPICAL PRN
Status: CANCELLED | OUTPATIENT
Start: 2025-02-10

## 2025-02-10 RX ORDER — SODIUM CHLOR/HYPOCHLOROUS ACID 0.033 %
SOLUTION, IRRIGATION IRRIGATION PRN
OUTPATIENT
Start: 2025-02-10

## 2025-02-10 RX ORDER — GENTAMICIN SULFATE 1 MG/G
OINTMENT TOPICAL PRN
OUTPATIENT
Start: 2025-02-10

## 2025-02-10 RX ORDER — LIDOCAINE 50 MG/G
OINTMENT TOPICAL PRN
Status: CANCELLED | OUTPATIENT
Start: 2025-02-10

## 2025-02-10 RX ORDER — LIDOCAINE HYDROCHLORIDE 40 MG/ML
SOLUTION TOPICAL PRN
Status: CANCELLED | OUTPATIENT
Start: 2025-02-10

## 2025-02-10 RX ORDER — BACITRACIN ZINC AND POLYMYXIN B SULFATE 500; 1000 [USP'U]/G; [USP'U]/G
OINTMENT TOPICAL PRN
Status: CANCELLED | OUTPATIENT
Start: 2025-02-10

## 2025-02-10 RX ORDER — LIDOCAINE HYDROCHLORIDE 20 MG/ML
JELLY TOPICAL PRN
Status: CANCELLED | OUTPATIENT
Start: 2025-02-10

## 2025-02-10 RX ORDER — LIDOCAINE 50 MG/G
OINTMENT TOPICAL PRN
OUTPATIENT
Start: 2025-02-10

## 2025-02-10 RX ORDER — GENTAMICIN SULFATE 1 MG/G
OINTMENT TOPICAL PRN
Status: CANCELLED | OUTPATIENT
Start: 2025-02-10

## 2025-02-10 RX ORDER — BETAMETHASONE DIPROPIONATE 0.5 MG/G
CREAM TOPICAL PRN
OUTPATIENT
Start: 2025-02-10

## 2025-02-10 RX ORDER — GINSENG 100 MG
CAPSULE ORAL PRN
OUTPATIENT
Start: 2025-02-10

## 2025-02-10 RX ORDER — TRIAMCINOLONE ACETONIDE 1 MG/G
OINTMENT TOPICAL PRN
OUTPATIENT
Start: 2025-02-10

## 2025-02-10 RX ORDER — TRIAMCINOLONE ACETONIDE 1 MG/G
OINTMENT TOPICAL PRN
Status: CANCELLED | OUTPATIENT
Start: 2025-02-10

## 2025-02-10 RX ORDER — MUPIROCIN 20 MG/G
OINTMENT TOPICAL PRN
Status: CANCELLED | OUTPATIENT
Start: 2025-02-10

## 2025-02-10 RX ORDER — LIDOCAINE 40 MG/G
CREAM TOPICAL PRN
Status: CANCELLED | OUTPATIENT
Start: 2025-02-10

## 2025-02-10 RX ORDER — LIDOCAINE 40 MG/G
CREAM TOPICAL PRN
OUTPATIENT
Start: 2025-02-10

## 2025-02-10 ASSESSMENT — PAIN SCALES - GENERAL: PAINLEVEL_OUTOF10: 8

## 2025-02-10 NOTE — H&P
Wound Center  History and Physical    Date of Service: 2/10/25     Date of Initial Visit for Current Problem:  2/10/25    Subjective:     Chief Complaint:  Job Ruelas is a 64 y.o.  male who presents with Lt. foot plantar forefoot wound of 12 weeks duration.    Referred by:  Dr. Trimble    HPI:   Has lost some toes, does not want to lose more. Was being treated by a podiatrist, but did not seem to be making progress.  Wound caused by: chronic pressure/irritation due to neuropathy/diabetes.  Current wound care:  Offloading wound: no  Appetite: good  Wound associated pain: none  Diabetic: Yes - last A1c = 5.4.  Smoker: No    Past Medical History:   Diagnosis Date    Diabetes (HCC)     Hypertension       Past Surgical History:   Procedure Laterality Date    AMPUTATION      CARDIAC SURGERY       Family History   Problem Relation Age of Onset    Cancer Mother     Cancer Sister     Cancer Brother       Social History     Tobacco Use    Smoking status: Never    Smokeless tobacco: Never   Substance Use Topics    Alcohol use: Not Currently       Prior to Admission medications    Medication Sig Start Date End Date Taking? Authorizing Provider   empagliflozin (JARDIANCE) 25 MG tablet TAKE ONE TABLET BY MOUTH DAILY AT 9AM 12/26/24  Yes Rayna Trimble APRN - NP   amiodarone (CORDARONE) 200 MG tablet TAKE ONE TABLET BY MOUTH DAILY AT 9AM 12/19/24  Yes Rochelle Delgadillo APRN - NP   diclofenac sodium (VOLTAREN) 1 % GEL APPLY 2 G TOPICALLY 4 TIMES A DAY 11/4/24  Yes Rayna Trimble APRN - NP   HYDROcodone-acetaminophen (NORCO)  MG per tablet TAKE 1/2 PILL EVERY 6 HOURS FOR PAIN RELIEF. DO NOT EXCEED 2 WHOLE PILLS A DAY 10/2/24  Yes Provider, MD Eula   atorvastatin (LIPITOR) 40 MG tablet TAKE 1 TABLET BY MOUTH EVERY DAY 10/2/24  Yes Rayna Trimble APRN - NP   gabapentin (NEURONTIN) 300 MG capsule Take 2 capsules by mouth 3 times daily for 90 days. Max Daily Amount: 1,800 mg 5/9/24 2/10/25 Yes

## 2025-02-10 NOTE — FLOWSHEET NOTE
Total Contact Cast    NAME:  Job Ruelas  YOB: 1960  MEDICAL RECORD NUMBER:  [id number]  DATE:  2/10/2025     02/10/25 1533   Wound 02/10/25 Foot Plantar;Left   Date First Assessed/Time First Assessed: 02/10/25 1425   Wound Approximate Age at First Assessment (Weeks): 12 weeks  Primary Wound Type: Diabetic Ulcer  Location: Foot  Wound Location Orientation: Plantar;Left   Dressing Status New dressing applied   Dressing/Treatment Honey gel/honey paste;ABD   Offloading for Diabetic Foot Ulcers Total contact cast  (applied by MD)       Goal:  Patient will maintain integrity of cast, avoid mobility hazards, and report complications that may occur (foul odor, pain, numbness, cracked cast).    Applied ordered dressing.  Applied per MD order  Applied in clinic to left lower leg.  Allow cast to dry.   Instructed patient to report to health care provider, including wound care center, any back pain, hip pain, or leg pain, numbness of toes, or any odor coming from the cast.   Instructed patient not to stick any foreign objects down into cast.  Instructed patient to utilize assistive devices(crutches, cane or walker) as ordered.  Instructed patient to continue offloading as directed.       Applied cast per  Guidelines  Response to treatment: Well tolerated by patient    Electronically signed by JOSEFINA DAWKINS RN on 2/10/2025 at 3:35 PM

## 2025-02-10 NOTE — FLOWSHEET NOTE
02/10/25 1420   Wound 02/10/25 Foot Plantar;Left   Date First Assessed/Time First Assessed: 02/10/25 1425   Wound Approximate Age at First Assessment (Weeks): 12 weeks  Primary Wound Type: Diabetic Ulcer  Location: Foot  Wound Location Orientation: Plantar;Left   Wound Image    Wound Etiology Diabetic   Dressing Status Other (Comment)  (open to air)   Wound Cleansed Cleansed with saline   Offloading for Diabetic Foot Ulcers Offloading not ordered   Wound Length (cm) 2 cm   Wound Width (cm) 1.5 cm   Wound Depth (cm) 1 cm   Wound Surface Area (cm^2) 3 cm^2   Wound Volume (cm^3) 3 cm^3   Wound Assessment Pink/red   Drainage Amount Small (< 25%)   Drainage Description Serous   Odor None   Gianna-wound Assessment Hyperkeratosis (callous)   Margins Defined edges   Wound Thickness Description not for Pressure Injury Full thickness   Pain Assessment   Pain Assessment 0-10   Pain Level 8     BP (!) 173/79   Pulse 65   Temp 97.5 °F (36.4 °C) (Temporal)   Resp 18

## 2025-02-17 ENCOUNTER — HOSPITAL ENCOUNTER (OUTPATIENT)
Facility: HOSPITAL | Age: 65
Discharge: HOME | End: 2025-02-17
Attending: ORTHOPAEDIC SURGERY
Payer: MEDICARE

## 2025-02-17 VITALS
DIASTOLIC BLOOD PRESSURE: 71 MMHG | RESPIRATION RATE: 16 BRPM | TEMPERATURE: 98.6 F | HEART RATE: 87 BPM | SYSTOLIC BLOOD PRESSURE: 168 MMHG

## 2025-02-17 DIAGNOSIS — L97.522 DIABETIC ULCER OF TOE OF LEFT FOOT ASSOCIATED WITH DIABETES MELLITUS DUE TO UNDERLYING CONDITION, WITH FAT LAYER EXPOSED (HCC): Primary | ICD-10-CM

## 2025-02-17 DIAGNOSIS — E08.621 DIABETIC ULCER OF TOE OF LEFT FOOT ASSOCIATED WITH DIABETES MELLITUS DUE TO UNDERLYING CONDITION, WITH FAT LAYER EXPOSED (HCC): Primary | ICD-10-CM

## 2025-02-17 PROCEDURE — 29445 APPL RIGID TOT CNTC LEG CAST: CPT

## 2025-02-17 PROCEDURE — 11042 DBRDMT SUBQ TIS 1ST 20SQCM/<: CPT

## 2025-02-17 RX ORDER — LIDOCAINE HYDROCHLORIDE 20 MG/ML
15 SOLUTION OROPHARYNGEAL
Status: CANCELLED | OUTPATIENT
Start: 2025-02-17

## 2025-02-17 RX ORDER — TRIAMCINOLONE ACETONIDE 1 MG/G
OINTMENT TOPICAL PRN
OUTPATIENT
Start: 2025-02-17

## 2025-02-17 RX ORDER — LIDOCAINE 50 MG/G
OINTMENT TOPICAL PRN
OUTPATIENT
Start: 2025-02-17

## 2025-02-17 RX ORDER — NEOMYCIN/BACITRACIN/POLYMYXINB 3.5-400-5K
OINTMENT (GRAM) TOPICAL PRN
OUTPATIENT
Start: 2025-02-17

## 2025-02-17 RX ORDER — BACITRACIN ZINC AND POLYMYXIN B SULFATE 500; 1000 [USP'U]/G; [USP'U]/G
OINTMENT TOPICAL PRN
OUTPATIENT
Start: 2025-02-17

## 2025-02-17 RX ORDER — GENTAMICIN SULFATE 1 MG/G
OINTMENT TOPICAL PRN
OUTPATIENT
Start: 2025-02-17

## 2025-02-17 RX ORDER — GINSENG 100 MG
CAPSULE ORAL PRN
OUTPATIENT
Start: 2025-02-17

## 2025-02-17 RX ORDER — CLOBETASOL PROPIONATE 0.5 MG/G
OINTMENT TOPICAL PRN
OUTPATIENT
Start: 2025-02-17

## 2025-02-17 RX ORDER — LIDOCAINE HYDROCHLORIDE 20 MG/ML
JELLY TOPICAL PRN
OUTPATIENT
Start: 2025-02-17

## 2025-02-17 RX ORDER — BETAMETHASONE DIPROPIONATE 0.5 MG/G
CREAM TOPICAL PRN
OUTPATIENT
Start: 2025-02-17

## 2025-02-17 RX ORDER — LIDOCAINE 40 MG/G
CREAM TOPICAL PRN
OUTPATIENT
Start: 2025-02-17

## 2025-02-17 RX ORDER — SODIUM CHLOR/HYPOCHLOROUS ACID 0.033 %
SOLUTION, IRRIGATION IRRIGATION PRN
OUTPATIENT
Start: 2025-02-17

## 2025-02-17 RX ORDER — MUPIROCIN 20 MG/G
OINTMENT TOPICAL PRN
OUTPATIENT
Start: 2025-02-17

## 2025-02-17 RX ORDER — LIDOCAINE HYDROCHLORIDE 40 MG/ML
SOLUTION TOPICAL PRN
OUTPATIENT
Start: 2025-02-17

## 2025-02-17 ASSESSMENT — PAIN DESCRIPTION - ORIENTATION: ORIENTATION: LEFT

## 2025-02-17 ASSESSMENT — PAIN SCALES - GENERAL: PAINLEVEL_OUTOF10: 2

## 2025-02-17 ASSESSMENT — PAIN DESCRIPTION - DESCRIPTORS: DESCRIPTORS: ACHING

## 2025-02-17 ASSESSMENT — PAIN - FUNCTIONAL ASSESSMENT: PAIN_FUNCTIONAL_ASSESSMENT: PREVENTS OR INTERFERES SOME ACTIVE ACTIVITIES AND ADLS

## 2025-02-17 ASSESSMENT — PAIN DESCRIPTION - PAIN TYPE: TYPE: CHRONIC PAIN

## 2025-02-17 ASSESSMENT — PAIN DESCRIPTION - LOCATION: LOCATION: FOOT

## 2025-02-17 ASSESSMENT — PAIN DESCRIPTION - FREQUENCY: FREQUENCY: INTERMITTENT

## 2025-02-17 NOTE — PROGRESS NOTES
weekly     If not healing rapidly will order new X-ray.     Patient understood and agrees with plan. Questions answered.     Weekly visits and serial debridements also discussed.  Follow up with me in 1 week     Signed By: Arnulfo Gan MD

## 2025-02-17 NOTE — PATIENT INSTRUCTIONS
Discharge Instructions [facility]  [address]   [address]  [place] [address]   Telephone: [phone]     FAX [phone]    NAME:  Job Ruelas  YOB: 1960  MEDICAL RECORD NUMBER:  [id number]  DATE:  2/17/2025    CPT code:Total contact cast (75244)    WOUND CARE ORDERS:  Left foot, Plantar :Cleanse with soap and water or normal saline & gauze. Apply primary dressing- Silver [place], Cover with secondary dressing - ABD pad, followed by Apply total contact cast (cast applied by MD) Dressing to changed (freq) Once a week in clinic  and as needed for compromise. Follow up with provider in 1 Week(s).     TREATMENT ORDERS:    Elevate leg(s) above the level of the heart when sitting.   Avoid prolonged standing in one place.  Do not get dressing/wrap wet.  Follow Diet as prescribed:   [] Diet as tolerated: [x] Calorie Diabetic Diet: Low carb and no Sugar [] No Added Salt:no more then 2,000 mg daily  [] Increase Protein: [] Limit the amount of liquid you are drinking and avoid drinking in between meals (limit to 2 quarts daily)     Return Appointment:  [x] Return Appointment: With  in  1 Week(s)  [] Nurse Visit :   [] Ordered tests:    Electronically signed CHRISTIANO SHAH RN on 2/17/2025 at 1:55 PM     Wound Care Center Information: Should you experience any significant changes in your wound(s) or have questions about your wound care, please contact the [facility] at MONDAY - FRIDAY 8:00 am - 4:30.  If you need help with your wound outside these hours and cannot wait until we are again available, contact your PCP or go to the hospital emergency room.   PLEASE NOTE: IF YOU ARE UNABLE TO OBTAIN WOUND SUPPLIES, CONTINUE TO USE THE SUPPLIES YOU HAVE AVAILABLE UNTIL YOU ARE ABLE TO REACH US. IT IS MOST IMPORTANT TO KEEP THE WOUND COVERED AT ALL TIMES.     Physician Signature:_______________________    Date: ___________ Time:

## 2025-02-17 NOTE — FLOWSHEET NOTE
02/17/25 1319   Anesthetic   Anesthetic 4% Lidocaine Liquid Topical   LLE Neurovascular Assessment   Capillary Refill Less than/Equal to 3 seconds   Color Appropriate for Ethnicity   Temperature Warm   LLE Sensation  Decreased   L Pedal Pulse +1   Wound 02/10/25 Foot Plantar;Left   Date First Assessed/Time First Assessed: 02/10/25 1425   Wound Approximate Age at First Assessment (Weeks): 12 weeks  Primary Wound Type: Diabetic Ulcer  Location: Foot  Wound Location Orientation: Plantar;Left   Wound Image    Wound Etiology Diabetic   Dressing Status Old drainage noted   Wound Cleansed Soap and water   Wound Length (cm) 1.5 cm   Wound Width (cm) 1.3 cm   Wound Depth (cm) 0.4 cm   Wound Surface Area (cm^2) 1.95 cm^2   Change in Wound Size % (l*w) 35   Wound Volume (cm^3) 0.78 cm^3   Wound Healing % 74   Wound Assessment [place]/red;Slough   Drainage Amount Small (< 25%)   Drainage Description Serous   Odor None   Gianna-wound Assessment Hyperkeratosis (callous)   Margins Defined edges   Wound Thickness Description not for Pressure Injury Full thickness     BP (!) 168/71   Pulse 87   Temp 98.6 °F (37 °C) (Temporal)   Resp 16

## 2025-02-21 DIAGNOSIS — I48.0 PAROXYSMAL ATRIAL FIBRILLATION (HCC): ICD-10-CM

## 2025-02-21 DIAGNOSIS — E78.2 MIXED HYPERLIPIDEMIA: ICD-10-CM

## 2025-02-21 RX ORDER — ATORVASTATIN CALCIUM 40 MG/1
TABLET, FILM COATED ORAL
Qty: 90 TABLET | Refills: 0 | Status: SHIPPED | OUTPATIENT
Start: 2025-02-21

## 2025-02-21 RX ORDER — AMIODARONE HYDROCHLORIDE 200 MG/1
TABLET ORAL
Qty: 90 TABLET | Refills: 0 | Status: SHIPPED | OUTPATIENT
Start: 2025-02-21

## 2025-02-26 ENCOUNTER — HOSPITAL ENCOUNTER (OUTPATIENT)
Facility: HOSPITAL | Age: 65
Discharge: HOME | End: 2025-02-26
Attending: ORTHOPAEDIC SURGERY
Payer: MEDICARE

## 2025-02-26 VITALS
RESPIRATION RATE: 16 BRPM | HEART RATE: 69 BPM | TEMPERATURE: 98.1 F | DIASTOLIC BLOOD PRESSURE: 75 MMHG | SYSTOLIC BLOOD PRESSURE: 129 MMHG

## 2025-02-26 DIAGNOSIS — L97.522 DIABETIC ULCER OF TOE OF LEFT FOOT ASSOCIATED WITH DIABETES MELLITUS DUE TO UNDERLYING CONDITION, WITH FAT LAYER EXPOSED (HCC): Primary | ICD-10-CM

## 2025-02-26 DIAGNOSIS — E08.621 DIABETIC ULCER OF TOE OF LEFT FOOT ASSOCIATED WITH DIABETES MELLITUS DUE TO UNDERLYING CONDITION, WITH FAT LAYER EXPOSED (HCC): Primary | ICD-10-CM

## 2025-02-26 PROCEDURE — 29445 APPL RIGID TOT CNTC LEG CAST: CPT

## 2025-02-26 RX ORDER — GENTAMICIN SULFATE 1 MG/G
OINTMENT TOPICAL PRN
OUTPATIENT
Start: 2025-02-26

## 2025-02-26 RX ORDER — LIDOCAINE HYDROCHLORIDE 20 MG/ML
JELLY TOPICAL PRN
OUTPATIENT
Start: 2025-02-26

## 2025-02-26 RX ORDER — LIDOCAINE 50 MG/G
OINTMENT TOPICAL PRN
OUTPATIENT
Start: 2025-02-26

## 2025-02-26 RX ORDER — TRIAMCINOLONE ACETONIDE 1 MG/G
OINTMENT TOPICAL PRN
OUTPATIENT
Start: 2025-02-26

## 2025-02-26 RX ORDER — BACITRACIN ZINC AND POLYMYXIN B SULFATE 500; 1000 [USP'U]/G; [USP'U]/G
OINTMENT TOPICAL PRN
OUTPATIENT
Start: 2025-02-26

## 2025-02-26 RX ORDER — BETAMETHASONE DIPROPIONATE 0.5 MG/G
CREAM TOPICAL PRN
OUTPATIENT
Start: 2025-02-26

## 2025-02-26 RX ORDER — LIDOCAINE 40 MG/G
CREAM TOPICAL PRN
OUTPATIENT
Start: 2025-02-26

## 2025-02-26 RX ORDER — LIDOCAINE HYDROCHLORIDE 40 MG/ML
SOLUTION TOPICAL PRN
OUTPATIENT
Start: 2025-02-26

## 2025-02-26 RX ORDER — SODIUM CHLOR/HYPOCHLOROUS ACID 0.033 %
SOLUTION, IRRIGATION IRRIGATION PRN
OUTPATIENT
Start: 2025-02-26

## 2025-02-26 RX ORDER — NEOMYCIN/BACITRACIN/POLYMYXINB 3.5-400-5K
OINTMENT (GRAM) TOPICAL PRN
OUTPATIENT
Start: 2025-02-26

## 2025-02-26 RX ORDER — CLOBETASOL PROPIONATE 0.5 MG/G
OINTMENT TOPICAL PRN
OUTPATIENT
Start: 2025-02-26

## 2025-02-26 RX ORDER — MUPIROCIN 20 MG/G
OINTMENT TOPICAL PRN
OUTPATIENT
Start: 2025-02-26

## 2025-02-26 RX ORDER — GINSENG 100 MG
CAPSULE ORAL PRN
OUTPATIENT
Start: 2025-02-26

## 2025-02-26 NOTE — FLOWSHEET NOTE
02/26/25 0954   Left Leg Edema Point of Measurement   Compression Therapy   (total contact cast)   LLE Neurovascular Assessment   Capillary Refill Less than/Equal to 3 seconds   Color Appropriate for Ethnicity   Temperature Warm   L Pedal Pulse +2   Wound 02/10/25 Foot Plantar;Left   Date First Assessed/Time First Assessed: 02/10/25 1425   Wound Approximate Age at First Assessment (Weeks): 12 weeks  Primary Wound Type: Diabetic Ulcer  Location: Foot  Wound Location Orientation: Plantar;Left   Wound Image    Wound Etiology Diabetic   Dressing Status Old drainage noted   Wound Cleansed Soap and water   Offloading for Diabetic Foot Ulcers Total contact cast   Wound Length (cm) 1.5 cm   Wound Width (cm) 1.3 cm   Wound Depth (cm) 0.4 cm   Wound Surface Area (cm^2) 1.95 cm^2   Change in Wound Size % (l*w) 35   Wound Volume (cm^3) 0.78 cm^3   Wound Healing % 74   Wound Assessment Pink/red   Drainage Amount Moderate (25-50%)   Drainage Description Serous   Odor None   Gianna-wound Assessment Hyperkeratosis (callous)   Margins Defined edges   Wound Thickness Description not for Pressure Injury Full thickness   Pain Assessment   Pain Assessment None - Denies Pain     /75   Pulse 69   Temp 98.1 °F (36.7 °C) (Temporal)   Resp 16

## 2025-02-26 NOTE — PROGRESS NOTES
Wound Center  Progress Note     Date of Service: 2/26/25      Date of Initial Visit for Current Problem:  2/10/25     Subjective:      Chief Complaint:  Job Ruelas is a 64 y.o.  male who presents with Lt. foot plantar forefoot wound of about 14 weeks duration.     Referred by:  Dr. Trimble     HPI:   Has lost some toes, does not want to lose more. Was being treated by a podiatrist, but did not seem to be making progress.  Wound caused by: chronic pressure/irritation due to neuropathy/diabetes.  Current wound care:  Offloading wound: no  Appetite: good  Wound associated pain: none  Diabetic: Yes - last A1c = 5.4.  Smoker: No     Past Medical History           Past Medical History:   Diagnosis Date    Diabetes (HCC)      Hypertension           Past Surgical History             Past Surgical History:   Procedure Laterality Date    AMPUTATION        CARDIAC SURGERY             Family History             Family History   Problem Relation Age of Onset    Cancer Mother      Cancer Sister      Cancer Brother           Social History              Tobacco Use    Smoking status: Never    Smokeless tobacco: Never   Substance Use Topics    Alcohol use: Not Currently       Home Medications                 Prior to Admission medications    Medication Sig Start Date End Date Taking? Authorizing Provider   empagliflozin (JARDIANCE) 25 MG tablet TAKE ONE TABLET BY MOUTH DAILY AT 9AM 12/26/24   Yes Rayna Trimble APRN - NP   amiodarone (CORDARONE) 200 MG tablet TAKE ONE TABLET BY MOUTH DAILY AT 9AM 12/19/24   Yes Rochelle Delgadillo APRN - NP   diclofenac sodium (VOLTAREN) 1 % GEL APPLY 2 G TOPICALLY 4 TIMES A DAY 11/4/24   Yes Rayna Trimble APRN - NP   HYDROcodone-acetaminophen (NORCO)  MG per tablet TAKE 1/2 PILL EVERY 6 HOURS FOR PAIN RELIEF. DO NOT EXCEED 2 WHOLE PILLS A DAY 10/2/24   Yes Provider, MD Eula   atorvastatin (LIPITOR) 40 MG tablet TAKE 1 TABLET BY MOUTH EVERY DAY 10/2/24   Yes Nai

## 2025-02-26 NOTE — FLOWSHEET NOTE
Total Contact Cast    NAME:  Job Ruelas  YOB: 1960  MEDICAL RECORD NUMBER:  [id number]  DATE:  2/26/2025    Goal:  Patient will maintain integrity of cast, avoid mobility hazards, and report complications that may occur (foul odor, pain, numbness, cracked cast).     Removed old contact cast if indicated and wash extremity with soap and water.  Applied ordered dressing.  Applied per   Applied in clinic to left lower leg.  Allow cast to dry.   Instructed patient to report to health care provider, including wound care center, any back pain, hip pain, or leg pain, numbness of toes, or any odor coming from the cast.   Instructed patient not to stick any foreign objects down into cast.  Instructed patient to utilize assistive devices(crutches, cane or walker) as ordered.  Instructed patient to continue offloading as directed.       Applied cast per  Guidelines    Electronically signed by Rupa Vega LPN on 2/26/2025 at 11:08 AM       02/26/25 1029   Wound 02/10/25 Foot Plantar;Left   Date First Assessed/Time First Assessed: 02/10/25 1425   Wound Approximate Age at First Assessment (Weeks): 12 weeks  Primary Wound Type: Diabetic Ulcer  Location: Foot  Wound Location Orientation: Plantar;Left   Dressing/Treatment Collagen with Ag;ABD;Foam   Offloading for Diabetic Foot Ulcers Total contact cast   Post-Procedure Length (cm) 1.5 cm   Post-Procedure Width (cm) 1.3 cm   Post-Procedure Depth (cm) 0.6 cm   Post-Procedure Surface Area (cm^2) 1.95 cm^2   Post-Procedure Volume (cm^3) 1.17 cm^3

## 2025-02-26 NOTE — PATIENT INSTRUCTIONS
Discharge Instructions [facility]  [address]   [address]  [place] [address]   Telephone: [phone]     FAX [phone]    NAME:  Job Ruelas  YOB: 1960  MEDICAL RECORD NUMBER:  [id number]  DATE:  2/26/2025    CPT code:Total contact cast (74329)    WOUND CARE ORDERS:  Left foot, Plantar :Cleanse with soap and water or normal saline & gauze. Apply Foam Windowpane followed by Collagen with Silver to wound bed.  Cover with secondary dressing - ABD pad, followed by Apply total contact cast (cast applied by MD) Dressing to changed (freq) Once a week in clinic  and as needed for compromise. Follow up with provider in 1 Week(s).     TREATMENT ORDERS:    Elevate leg(s) above the level of the heart when sitting.   Avoid prolonged standing in one place.  Do not get dressing/cast wet.  Follow Diet as prescribed:   [] Diet as tolerated: [x] Calorie Diabetic Diet: Low carb and no Sugar [] No Added Salt:no more then 2,000 mg daily  [] Increase Protein: [] Limit the amount of liquid you are drinking and avoid drinking in between meals (limit to 2 quarts daily)     Return Appointment:  [x] Return Appointment: With  in 1 Week(s)  [] Nurse Visit :   [] Ordered tests:    Electronically signed CHRISTIANO SHAH RN on 2/26/2025 at 10:31 AM     Wound Care Center Information: Should you experience any significant changes in your wound(s) or have questions about your wound care, please contact the [facility] at MONDAY - FRIDAY 8:00 am - 4:30.  If you need help with your wound outside these hours and cannot wait until we are again available, contact your PCP or go to the hospital emergency room.   PLEASE NOTE: IF YOU ARE UNABLE TO OBTAIN WOUND SUPPLIES, CONTINUE TO USE THE SUPPLIES YOU HAVE AVAILABLE UNTIL YOU ARE ABLE TO REACH US. IT IS MOST IMPORTANT TO KEEP THE WOUND COVERED AT ALL TIMES.     Physician Signature:_______________________    Date:

## 2025-02-27 ENCOUNTER — HOSPITAL ENCOUNTER (OUTPATIENT)
Facility: HOSPITAL | Age: 65
Discharge: HOME | End: 2025-02-27
Attending: ORTHOPAEDIC SURGERY
Payer: MEDICARE

## 2025-02-27 DIAGNOSIS — M75.101 RIGHT ROTATOR CUFF TEAR ARTHROPATHY: ICD-10-CM

## 2025-02-27 DIAGNOSIS — M12.811 RIGHT ROTATOR CUFF TEAR ARTHROPATHY: ICD-10-CM

## 2025-02-27 PROCEDURE — 73200 CT UPPER EXTREMITY W/O DYE: CPT

## 2025-03-05 ENCOUNTER — HOSPITAL ENCOUNTER (OUTPATIENT)
Facility: HOSPITAL | Age: 65
Discharge: HOME | End: 2025-03-05
Attending: ORTHOPAEDIC SURGERY
Payer: MEDICAID

## 2025-03-05 VITALS
TEMPERATURE: 97.9 F | RESPIRATION RATE: 18 BRPM | DIASTOLIC BLOOD PRESSURE: 62 MMHG | SYSTOLIC BLOOD PRESSURE: 133 MMHG | HEART RATE: 71 BPM

## 2025-03-05 DIAGNOSIS — E08.621 DIABETIC ULCER OF TOE OF LEFT FOOT ASSOCIATED WITH DIABETES MELLITUS DUE TO UNDERLYING CONDITION, WITH FAT LAYER EXPOSED (HCC): Primary | ICD-10-CM

## 2025-03-05 DIAGNOSIS — L97.522 DIABETIC ULCER OF TOE OF LEFT FOOT ASSOCIATED WITH DIABETES MELLITUS DUE TO UNDERLYING CONDITION, WITH FAT LAYER EXPOSED (HCC): Primary | ICD-10-CM

## 2025-03-05 PROCEDURE — 29445 APPL RIGID TOT CNTC LEG CAST: CPT

## 2025-03-05 RX ORDER — TRIAMCINOLONE ACETONIDE 1 MG/G
OINTMENT TOPICAL PRN
OUTPATIENT
Start: 2025-03-05

## 2025-03-05 RX ORDER — GENTAMICIN SULFATE 1 MG/G
OINTMENT TOPICAL PRN
OUTPATIENT
Start: 2025-03-05

## 2025-03-05 RX ORDER — MUPIROCIN 20 MG/G
OINTMENT TOPICAL PRN
OUTPATIENT
Start: 2025-03-05

## 2025-03-05 RX ORDER — BETAMETHASONE DIPROPIONATE 0.5 MG/G
CREAM TOPICAL PRN
OUTPATIENT
Start: 2025-03-05

## 2025-03-05 RX ORDER — SODIUM CHLOR/HYPOCHLOROUS ACID 0.033 %
SOLUTION, IRRIGATION IRRIGATION PRN
OUTPATIENT
Start: 2025-03-05

## 2025-03-05 RX ORDER — LIDOCAINE 40 MG/G
CREAM TOPICAL PRN
OUTPATIENT
Start: 2025-03-05

## 2025-03-05 RX ORDER — LIDOCAINE HYDROCHLORIDE 40 MG/ML
SOLUTION TOPICAL PRN
OUTPATIENT
Start: 2025-03-05

## 2025-03-05 RX ORDER — LIDOCAINE HYDROCHLORIDE 20 MG/ML
JELLY TOPICAL PRN
OUTPATIENT
Start: 2025-03-05

## 2025-03-05 RX ORDER — GINSENG 100 MG
CAPSULE ORAL PRN
OUTPATIENT
Start: 2025-03-05

## 2025-03-05 RX ORDER — LIDOCAINE 50 MG/G
OINTMENT TOPICAL PRN
OUTPATIENT
Start: 2025-03-05

## 2025-03-05 RX ORDER — CLOBETASOL PROPIONATE 0.5 MG/G
OINTMENT TOPICAL PRN
OUTPATIENT
Start: 2025-03-05

## 2025-03-05 RX ORDER — BACITRACIN ZINC AND POLYMYXIN B SULFATE 500; 1000 [USP'U]/G; [USP'U]/G
OINTMENT TOPICAL PRN
OUTPATIENT
Start: 2025-03-05

## 2025-03-05 RX ORDER — NEOMYCIN/BACITRACIN/POLYMYXINB 3.5-400-5K
OINTMENT (GRAM) TOPICAL PRN
OUTPATIENT
Start: 2025-03-05

## 2025-03-05 NOTE — FLOWSHEET NOTE
03/05/25 0937   LLE Neurovascular Assessment   Capillary Refill Less than/Equal to 3 seconds   Color Appropriate for Ethnicity   Temperature Warm   L Pedal Pulse +2   Wound 02/10/25 Foot Plantar;Left   Date First Assessed/Time First Assessed: 02/10/25 1425   Wound Approximate Age at First Assessment (Weeks): 12 weeks  Primary Wound Type: Diabetic Ulcer  Location: Foot  Wound Location Orientation: Plantar;Left   Wound Image    Wound Etiology Diabetic   Dressing Status Old drainage noted   Wound Cleansed Soap and water   Offloading for Diabetic Foot Ulcers Total contact cast   Wound Length (cm) 1 cm   Wound Width (cm) 1 cm   Wound Depth (cm) 0.1 cm   Wound Surface Area (cm^2) 1 cm^2   Change in Wound Size % (l*w) 66.67   Wound Volume (cm^3) 0.1 cm^3   Wound Healing % 97   Wound Assessment Pink/red   Drainage Amount Moderate (25-50%)   Drainage Description Serosanguinous   Odor None   Gianna-wound Assessment Hyperkeratosis (callous)   Margins Defined edges   Wound Thickness Description not for Pressure Injury Full thickness   Pain Assessment   Pain Assessment None - Denies Pain     /62   Pulse 71   Temp 97.9 °F (36.6 °C) (Temporal)   Resp 18

## 2025-03-05 NOTE — PROGRESS NOTES
usual manner. Care discussed.   Frequency: weekly    Will put into a regular TCC if progress stops.     Patient understood and agrees with plan. Questions answered.     Weekly visits and serial debridements also discussed.  Follow up with me in 1 week     Signed By: Arnulfo Gan MD

## 2025-03-12 ENCOUNTER — HOSPITAL ENCOUNTER (OUTPATIENT)
Facility: HOSPITAL | Age: 65
Discharge: HOME | End: 2025-03-12
Attending: ORTHOPAEDIC SURGERY
Payer: MEDICARE

## 2025-03-12 VITALS
DIASTOLIC BLOOD PRESSURE: 55 MMHG | TEMPERATURE: 98 F | HEART RATE: 75 BPM | SYSTOLIC BLOOD PRESSURE: 97 MMHG | RESPIRATION RATE: 18 BRPM

## 2025-03-12 DIAGNOSIS — L97.522 DIABETIC ULCER OF TOE OF LEFT FOOT ASSOCIATED WITH DIABETES MELLITUS DUE TO UNDERLYING CONDITION, WITH FAT LAYER EXPOSED (HCC): Primary | ICD-10-CM

## 2025-03-12 DIAGNOSIS — E08.621 DIABETIC ULCER OF TOE OF LEFT FOOT ASSOCIATED WITH DIABETES MELLITUS DUE TO UNDERLYING CONDITION, WITH FAT LAYER EXPOSED (HCC): Primary | ICD-10-CM

## 2025-03-12 PROCEDURE — 29445 APPL RIGID TOT CNTC LEG CAST: CPT

## 2025-03-12 RX ORDER — LIDOCAINE HYDROCHLORIDE 40 MG/ML
SOLUTION TOPICAL PRN
OUTPATIENT
Start: 2025-03-12

## 2025-03-12 RX ORDER — MUPIROCIN 20 MG/G
OINTMENT TOPICAL PRN
OUTPATIENT
Start: 2025-03-12

## 2025-03-12 RX ORDER — GENTAMICIN SULFATE 1 MG/G
OINTMENT TOPICAL PRN
OUTPATIENT
Start: 2025-03-12

## 2025-03-12 RX ORDER — SODIUM CHLOR/HYPOCHLOROUS ACID 0.033 %
SOLUTION, IRRIGATION IRRIGATION PRN
OUTPATIENT
Start: 2025-03-12

## 2025-03-12 RX ORDER — LIDOCAINE 50 MG/G
OINTMENT TOPICAL PRN
OUTPATIENT
Start: 2025-03-12

## 2025-03-12 RX ORDER — TRIAMCINOLONE ACETONIDE 1 MG/G
OINTMENT TOPICAL PRN
OUTPATIENT
Start: 2025-03-12

## 2025-03-12 RX ORDER — GINSENG 100 MG
CAPSULE ORAL PRN
OUTPATIENT
Start: 2025-03-12

## 2025-03-12 RX ORDER — BACITRACIN ZINC AND POLYMYXIN B SULFATE 500; 1000 [USP'U]/G; [USP'U]/G
OINTMENT TOPICAL PRN
OUTPATIENT
Start: 2025-03-12

## 2025-03-12 RX ORDER — NEOMYCIN/BACITRACIN/POLYMYXINB 3.5-400-5K
OINTMENT (GRAM) TOPICAL PRN
OUTPATIENT
Start: 2025-03-12

## 2025-03-12 RX ORDER — LIDOCAINE 40 MG/G
CREAM TOPICAL PRN
OUTPATIENT
Start: 2025-03-12

## 2025-03-12 RX ORDER — LIDOCAINE HYDROCHLORIDE 20 MG/ML
JELLY TOPICAL PRN
OUTPATIENT
Start: 2025-03-12

## 2025-03-12 RX ORDER — CLOBETASOL PROPIONATE 0.5 MG/G
OINTMENT TOPICAL PRN
OUTPATIENT
Start: 2025-03-12

## 2025-03-12 RX ORDER — BETAMETHASONE DIPROPIONATE 0.5 MG/G
CREAM TOPICAL PRN
OUTPATIENT
Start: 2025-03-12

## 2025-03-12 NOTE — PATIENT INSTRUCTIONS
Discharge Instructions [facility]  [address]   [address], [address]  [place] [address]   Telephone: [phone]     FAX [phone]    NAME:  Job Ruelas  YOB: 1960  MEDICAL RECORD NUMBER:  [id number]  DATE:  3/12/2025    CPT code:Total contact cast (36620)    WOUND CARE ORDERS:  Left foot, Plantar :Cleanse with soap and water or normal saline & gauze. Apply Foam Windowpane (Foam to extend across plantar area of forefoot) followed by Collagen with Silver to wound bed.  Cover with secondary dressing - ABD pad, followed by Apply total contact cast (cast applied by MD) Dressing to changed (freq) Once a week in clinic  and as needed for compromise. Follow up with provider in 1 Week(s).     TREATMENT ORDERS:    Elevate leg(s) above the level of the heart when sitting.   Avoid prolonged standing in one place.  Do not get dressing/cast wet.  Follow Diet as prescribed:   [] Diet as tolerated: [x] Calorie Diabetic Diet: Low carb and no Sugar [] No Added Salt:no more then 2,000 mg daily  [] Increase Protein: [] Limit the amount of liquid you are drinking and avoid drinking in between meals (limit to 2 quarts daily)     Return Appointment:  [x] Return Appointment: With  in 1 Week(s)  [] Nurse Visit :   [] Ordered tests:    Electronically signed CHRISTIANO SHAH RN on 3/12/2025 at 10:01 AM     Wound Care Center Information: Should you experience any significant changes in your wound(s) or have questions about your wound care, please contact the [facility] at MONDAY - FRIDAY 8:00 am - 4:30.  If you need help with your wound outside these hours and cannot wait until we are again available, contact your PCP or go to the hospital emergency room.   PLEASE NOTE: IF YOU ARE UNABLE TO OBTAIN WOUND SUPPLIES, CONTINUE TO USE THE SUPPLIES YOU HAVE AVAILABLE UNTIL YOU ARE ABLE TO REACH US. IT IS MOST IMPORTANT TO KEEP THE WOUND COVERED AT ALL TIMES.

## 2025-03-12 NOTE — PROGRESS NOTES
Wound Center  Progress Note     Date of Service: 3/5/25      Date of Initial Visit for Current Problem:  2/10/25     Subjective:      Chief Complaint:  Job Ruelas is a 64 y.o.  male who presents with Lt. foot plantar forefoot wound of about 14 weeks duration.     Referred by:  Dr. Trimble     HPI:   Has lost some toes, does not want to lose more. Was being treated by a podiatrist, but did not seem to be making progress.  Wound caused by: chronic pressure/irritation due to neuropathy/diabetes.  Current wound care:  Offloading wound: no  Appetite: good  Wound associated pain: none  Diabetic: Yes - last A1c = 5.4.  Smoker: No     Past Medical History           Past Medical History:   Diagnosis Date    Diabetes (HCC)      Hypertension           Past Surgical History             Past Surgical History:   Procedure Laterality Date    AMPUTATION        CARDIAC SURGERY             Family History             Family History   Problem Relation Age of Onset    Cancer Mother      Cancer Sister      Cancer Brother           Social History              Tobacco Use    Smoking status: Never    Smokeless tobacco: Never   Substance Use Topics    Alcohol use: Not Currently       Home Medications                 Prior to Admission medications    Medication Sig Start Date End Date Taking? Authorizing Provider   empagliflozin (JARDIANCE) 25 MG tablet TAKE ONE TABLET BY MOUTH DAILY AT 9AM 12/26/24   Yes Rayna Trimble APRN - NP   amiodarone (CORDARONE) 200 MG tablet TAKE ONE TABLET BY MOUTH DAILY AT 9AM 12/19/24   Yes Rochelle Delgadillo APRN - NP   diclofenac sodium (VOLTAREN) 1 % GEL APPLY 2 G TOPICALLY 4 TIMES A DAY 11/4/24   Yes Rayna Trimble APRN - NP   HYDROcodone-acetaminophen (NORCO)  MG per tablet TAKE 1/2 PILL EVERY 6 HOURS FOR PAIN RELIEF. DO NOT EXCEED 2 WHOLE PILLS A DAY 10/2/24   Yes Provider, MD Eula   atorvastatin (LIPITOR) 40 MG tablet TAKE 1 TABLET BY MOUTH EVERY DAY 10/2/24   Yes Nai

## 2025-03-12 NOTE — FLOWSHEET NOTE
03/12/25 0844   Wound 02/10/25 Foot Plantar;Left   Date First Assessed/Time First Assessed: 02/10/25 1425   Wound Approximate Age at First Assessment (Weeks): 12 weeks  Primary Wound Type: Diabetic Ulcer  Location: Foot  Wound Location Orientation: Plantar;Left   Wound Image    Wound Etiology Diabetic   Dressing Status Old drainage noted   Wound Cleansed Soap and water   Offloading for Diabetic Foot Ulcers Total contact cast   Wound Length (cm) 0.6 cm   Wound Width (cm) 0.6 cm   Wound Depth (cm) 0.1 cm   Wound Surface Area (cm^2) 0.36 cm^2   Change in Wound Size % (l*w) 88   Wound Volume (cm^3) 0.036 cm^3   Wound Healing % 99   Wound Assessment Pink/red   Drainage Amount Moderate (25-50%)   Drainage Description Serosanguinous   Odor None   Gianna-wound Assessment Hyperkeratosis (callous)   Margins Defined edges   Wound Thickness Description not for Pressure Injury Full thickness   Pain Assessment   Pain Assessment None - Denies Pain     BP (!) 97/55   Pulse 75   Temp 98 °F (36.7 °C) (Temporal)   Resp 18

## 2025-03-17 ENCOUNTER — OFFICE VISIT (OUTPATIENT)
Age: 65
End: 2025-03-17
Payer: MEDICARE

## 2025-03-17 VITALS
TEMPERATURE: 97.8 F | RESPIRATION RATE: 20 BRPM | OXYGEN SATURATION: 98 % | BODY MASS INDEX: 35.26 KG/M2 | WEIGHT: 260 LBS | SYSTOLIC BLOOD PRESSURE: 118 MMHG | HEART RATE: 72 BPM | DIASTOLIC BLOOD PRESSURE: 68 MMHG

## 2025-03-17 DIAGNOSIS — Z00.00 MEDICARE ANNUAL WELLNESS VISIT, SUBSEQUENT: Primary | ICD-10-CM

## 2025-03-17 DIAGNOSIS — I48.0 PAROXYSMAL ATRIAL FIBRILLATION (HCC): ICD-10-CM

## 2025-03-17 DIAGNOSIS — I50.32 CHRONIC HEART FAILURE WITH PRESERVED EJECTION FRACTION (HCC): ICD-10-CM

## 2025-03-17 DIAGNOSIS — N18.32 CHRONIC KIDNEY DISEASE, STAGE 3B (HCC): ICD-10-CM

## 2025-03-17 DIAGNOSIS — E11.621 DIABETIC ULCER OF RIGHT MIDFOOT ASSOCIATED WITH TYPE 2 DIABETES MELLITUS, WITH MUSCLE INVOLVEMENT WITHOUT EVIDENCE OF NECROSIS: ICD-10-CM

## 2025-03-17 DIAGNOSIS — Z95.2 S/P TAVR (TRANSCATHETER AORTIC VALVE REPLACEMENT): ICD-10-CM

## 2025-03-17 DIAGNOSIS — E11.42 TYPE 2 DIABETES MELLITUS WITH DIABETIC POLYNEUROPATHY, WITHOUT LONG-TERM CURRENT USE OF INSULIN (HCC): ICD-10-CM

## 2025-03-17 DIAGNOSIS — E78.2 MIXED HYPERLIPIDEMIA: ICD-10-CM

## 2025-03-17 DIAGNOSIS — I10 ESSENTIAL (PRIMARY) HYPERTENSION: ICD-10-CM

## 2025-03-17 DIAGNOSIS — L97.415 DIABETIC ULCER OF RIGHT MIDFOOT ASSOCIATED WITH TYPE 2 DIABETES MELLITUS, WITH MUSCLE INVOLVEMENT WITHOUT EVIDENCE OF NECROSIS: ICD-10-CM

## 2025-03-17 PROCEDURE — 99214 OFFICE O/P EST MOD 30 MIN: CPT

## 2025-03-17 PROCEDURE — 1160F RVW MEDS BY RX/DR IN RCRD: CPT

## 2025-03-17 PROCEDURE — 36415 COLL VENOUS BLD VENIPUNCTURE: CPT

## 2025-03-17 PROCEDURE — 1036F TOBACCO NON-USER: CPT

## 2025-03-17 PROCEDURE — G0439 PPPS, SUBSEQ VISIT: HCPCS

## 2025-03-17 PROCEDURE — 2022F DILAT RTA XM EVC RTNOPTHY: CPT

## 2025-03-17 PROCEDURE — 1126F AMNT PAIN NOTED NONE PRSNT: CPT

## 2025-03-17 PROCEDURE — 3074F SYST BP LT 130 MM HG: CPT

## 2025-03-17 PROCEDURE — 3017F COLORECTAL CA SCREEN DOC REV: CPT

## 2025-03-17 PROCEDURE — 3046F HEMOGLOBIN A1C LEVEL >9.0%: CPT

## 2025-03-17 PROCEDURE — G8427 DOCREV CUR MEDS BY ELIG CLIN: HCPCS

## 2025-03-17 PROCEDURE — 1159F MED LIST DOCD IN RCRD: CPT

## 2025-03-17 PROCEDURE — 1123F ACP DISCUSS/DSCN MKR DOCD: CPT

## 2025-03-17 PROCEDURE — G8417 CALC BMI ABV UP PARAM F/U: HCPCS

## 2025-03-17 PROCEDURE — 3078F DIAST BP <80 MM HG: CPT

## 2025-03-17 RX ORDER — OXYCODONE AND ACETAMINOPHEN 10; 325 MG/1; MG/1
1 TABLET ORAL EVERY 4 HOURS PRN
COMMUNITY

## 2025-03-17 RX ORDER — GABAPENTIN 600 MG/1
600 TABLET ORAL 3 TIMES DAILY
COMMUNITY

## 2025-03-17 ASSESSMENT — PATIENT HEALTH QUESTIONNAIRE - PHQ9
1. LITTLE INTEREST OR PLEASURE IN DOING THINGS: NOT AT ALL
SUM OF ALL RESPONSES TO PHQ QUESTIONS 1-9: 1
2. FEELING DOWN, DEPRESSED OR HOPELESS: SEVERAL DAYS
SUM OF ALL RESPONSES TO PHQ QUESTIONS 1-9: 1

## 2025-03-17 NOTE — PROGRESS NOTES
Medicare Annual Wellness Visit    Job Ruelas is here for Medicare AWV    Assessment & Plan  Medicare annual wellness visit, subsequent     Essential (primary) hypertension   Chronic, at goal (stable), continue current treatment plan    Orders:    COLLECTION VENOUS BLOOD,VENIPUNCTURE    Basic Metabolic Panel; Future    Type 2 diabetes mellitus with diabetic polyneuropathy, without long-term current use of insulin (HCC)   Chronic, at goal (stable), continue current treatment plan, medication adherence emphasized, and lifestyle modifications recommended    Orders:    COLLECTION VENOUS BLOOD,VENIPUNCTURE    Basic Metabolic Panel; Future    AFL - Weiler, Harold, MD, Ophthalmology, [place]    Diabetic ulcer of right midfoot associated with type 2 diabetes mellitus, with muscle involvement without evidence of necrosis (HCC)   Monitored by specialist- no acute findings meriting change in the plan    Chronic kidney disease, stage 3b (HCC)   Creatinine 1.53-->2.08-->1.72; referred to nephrology but has not yet scheduled.  Recheck today and encourage him to establish with nephrology as referred.  Orders:    COLLECTION VENOUS BLOOD,VENIPUNCTURE    Basic Metabolic Panel; Future    Paroxysmal atrial fibrillation (HCC)   Monitored by specialist- no acute findings meriting change in the plan    Mixed hyperlipidemia   Chronic, at goal (stable), continue current treatment plan    S/P TAVR (transcatheter aortic valve replacement)   Monitored by specialist- no acute findings meriting change in the plan    Chronic heart failure with preserved ejection fraction (HCC)   Monitored by specialist- no acute findings meriting change in the plan  Euvolemic on exam today.         Return in 6 months (on 9/17/2025).     Subjective   The following acute and/or chronic problems were also addressed today:    Cardiac: PAF, rate controlled on amiodarone, on Eliquis for stroke prevention; denies palpitations.  Severe aortic stenosis, s/p TAVR

## 2025-03-17 NOTE — PROGRESS NOTES
\"Have you been to the ER, urgent care clinic since your last visit?  Hospitalized since your last visit?\"    NO    “Have you seen or consulted any other health care providers outside our system since your last visit?”    No    “Have you had a colorectal cancer screening such as a colonoscopy/FIT/Cologuard?    NO    No colonoscopy on file  No cologuard on file  No FIT/FOBT on file   No flexible sigmoidoscopy on file     “Have you had a diabetic eye exam?”    NO     No diabetic eye exam on file            Chief Complaint   Patient presents with    Medicare AWV       Vitals:    03/17/25 1047   BP: 118/68   Pulse: 72   Resp: 20   Temp: 97.8 °F (36.6 °C)   SpO2: 98%

## 2025-03-17 NOTE — PATIENT INSTRUCTIONS
their teeth or to clean their dentures. In some cases, you may need to do the brushing and other dental care tasks. People who have trouble using their hands or who have dementia may need this extra help.  How can you help with dental care?  Normal dental care  To keep the teeth and gums healthy:  Brush the teeth with fluoride toothpaste twice a day--in the morning and at night--and floss at least once a day. Plaque can quickly build up on the teeth of older adults.  Watch for the signs of gum disease. These signs include gums that bleed after brushing or after eating hard foods, such as apples.  See a dentist regularly. Many experts recommend checkups every 6 months.  Keep the dentist up to date on any new medications the person is taking.  Encourage a balanced diet that includes whole grains, vegetables, and fruits, and that is low in saturated fat and sodium.  Encourage the person you're caring for not to use tobacco products. They can affect dental and general health.  Many older adults have a fixed income and feel that they can't afford dental care. But most [facility] and [facility] have programs in which dentists help older adults by lowering fees. Contact your area's public health offices or  for information about dental care in your area.  Using a toothbrush  Older adults with arthritis sometimes have trouble brushing their teeth because they can't easily hold the toothbrush. Their hands and fingers may be stiff, painful, or weak. If this is the case, you can:  Offer an electric toothbrush.  Enlarge the handle of a non-electric toothbrush by wrapping a sponge, an elastic bandage, or adhesive tape around it.  Push the toothbrush handle through a ball made of rubber or soft foam.  Make the handle longer and thicker by taping Popsicle sticks or tongue depressors to it.  You may also be able to buy special toothbrushes, toothpaste dispensers, and floss holders.  Your doctor may recommend a soft-bristle

## 2025-03-17 NOTE — ACP (ADVANCE CARE PLANNING)
Discussed importance of advanced medical directives with patient.  Patient is capable of making decisions.    Discussed advanced directives 3/17/2025. [place] advance directive form discussed with pt. Pt will consider options and give us written form back for inclusion in chart.    ORSA Trinidad - NP

## 2025-03-18 LAB
ANION GAP SERPL CALC-SCNC: 7 MMOL/L (ref 2–12)
BUN SERPL-MCNC: 34 MG/DL (ref 6–20)
BUN/CREAT SERPL: 18 (ref 12–20)
CALCIUM SERPL-MCNC: 9.7 MG/DL (ref 8.5–10.1)
CHLORIDE SERPL-SCNC: 101 MMOL/L (ref 97–108)
CO2 SERPL-SCNC: 30 MMOL/L (ref 21–32)
CREAT SERPL-MCNC: 1.93 MG/DL (ref 0.7–1.3)
GLUCOSE SERPL-MCNC: 87 MG/DL (ref 65–100)
POTASSIUM SERPL-SCNC: 4.9 MMOL/L (ref 3.5–5.1)
SODIUM SERPL-SCNC: 138 MMOL/L (ref 136–145)

## 2025-03-19 ENCOUNTER — HOSPITAL ENCOUNTER (OUTPATIENT)
Facility: HOSPITAL | Age: 65
Discharge: HOME | End: 2025-03-19
Attending: ORTHOPAEDIC SURGERY
Payer: MEDICARE

## 2025-03-19 ENCOUNTER — RESULTS FOLLOW-UP (OUTPATIENT)
Age: 65
End: 2025-03-19

## 2025-03-19 VITALS
HEART RATE: 75 BPM | RESPIRATION RATE: 16 BRPM | SYSTOLIC BLOOD PRESSURE: 105 MMHG | DIASTOLIC BLOOD PRESSURE: 85 MMHG | TEMPERATURE: 98.1 F

## 2025-03-19 DIAGNOSIS — L97.522 DIABETIC ULCER OF TOE OF LEFT FOOT ASSOCIATED WITH DIABETES MELLITUS DUE TO UNDERLYING CONDITION, WITH FAT LAYER EXPOSED: Primary | ICD-10-CM

## 2025-03-19 DIAGNOSIS — E08.621 DIABETIC ULCER OF TOE OF LEFT FOOT ASSOCIATED WITH DIABETES MELLITUS DUE TO UNDERLYING CONDITION, WITH FAT LAYER EXPOSED: Primary | ICD-10-CM

## 2025-03-19 PROCEDURE — 29445 APPL RIGID TOT CNTC LEG CAST: CPT

## 2025-03-19 RX ORDER — BACITRACIN ZINC AND POLYMYXIN B SULFATE 500; 1000 [USP'U]/G; [USP'U]/G
OINTMENT TOPICAL PRN
OUTPATIENT
Start: 2025-03-19

## 2025-03-19 RX ORDER — GINSENG 100 MG
CAPSULE ORAL PRN
OUTPATIENT
Start: 2025-03-19

## 2025-03-19 RX ORDER — GENTAMICIN SULFATE 1 MG/G
OINTMENT TOPICAL PRN
OUTPATIENT
Start: 2025-03-19

## 2025-03-19 RX ORDER — LIDOCAINE 40 MG/G
CREAM TOPICAL PRN
OUTPATIENT
Start: 2025-03-19

## 2025-03-19 RX ORDER — LIDOCAINE HYDROCHLORIDE 20 MG/ML
JELLY TOPICAL PRN
OUTPATIENT
Start: 2025-03-19

## 2025-03-19 RX ORDER — NEOMYCIN/BACITRACIN/POLYMYXINB 3.5-400-5K
OINTMENT (GRAM) TOPICAL PRN
OUTPATIENT
Start: 2025-03-19

## 2025-03-19 RX ORDER — TRIAMCINOLONE ACETONIDE 1 MG/G
OINTMENT TOPICAL PRN
OUTPATIENT
Start: 2025-03-19

## 2025-03-19 RX ORDER — LIDOCAINE 50 MG/G
OINTMENT TOPICAL PRN
OUTPATIENT
Start: 2025-03-19

## 2025-03-19 RX ORDER — MUPIROCIN 20 MG/G
OINTMENT TOPICAL PRN
OUTPATIENT
Start: 2025-03-19

## 2025-03-19 RX ORDER — LIDOCAINE HYDROCHLORIDE 40 MG/ML
SOLUTION TOPICAL PRN
OUTPATIENT
Start: 2025-03-19

## 2025-03-19 RX ORDER — BETAMETHASONE DIPROPIONATE 0.5 MG/G
CREAM TOPICAL PRN
OUTPATIENT
Start: 2025-03-19

## 2025-03-19 RX ORDER — SODIUM CHLOR/HYPOCHLOROUS ACID 0.033 %
SOLUTION, IRRIGATION IRRIGATION PRN
OUTPATIENT
Start: 2025-03-19

## 2025-03-19 RX ORDER — CLOBETASOL PROPIONATE 0.5 MG/G
OINTMENT TOPICAL PRN
OUTPATIENT
Start: 2025-03-19

## 2025-03-19 ASSESSMENT — PAIN DESCRIPTION - LOCATION: LOCATION: SHOULDER

## 2025-03-19 ASSESSMENT — PAIN SCALES - GENERAL: PAINLEVEL_OUTOF10: 9

## 2025-03-19 ASSESSMENT — PAIN DESCRIPTION - ORIENTATION: ORIENTATION: RIGHT

## 2025-03-19 ASSESSMENT — PAIN DESCRIPTION - DESCRIPTORS: DESCRIPTORS: ACHING;DISCOMFORT

## 2025-03-19 NOTE — FLOWSHEET NOTE
03/19/25 0819   LLE Neurovascular Assessment   Capillary Refill Less than/Equal to 3 seconds   Color Appropriate for Ethnicity   Temperature Warm   L Pedal Pulse +2   Wound 02/10/25 Foot Plantar;Left   Date First Assessed/Time First Assessed: 02/10/25 1425   Wound Approximate Age at First Assessment (Weeks): 12 weeks  Primary Wound Type: Diabetic Ulcer  Location: Foot  Wound Location Orientation: Plantar;Left   Wound Image    Wound Etiology Diabetic   Dressing Status Old drainage noted   Wound Cleansed Soap and water   Offloading for Diabetic Foot Ulcers Total contact cast   Wound Length (cm) 0.4 cm   Wound Width (cm) 0.4 cm   Wound Depth (cm) 0.2 cm   Wound Surface Area (cm^2) 0.16 cm^2   Change in Wound Size % (l*w) 94.67   Wound Volume (cm^3) 0.032 cm^3   Wound Healing % 99   Wound Assessment Pink/red   Drainage Amount Moderate (25-50%)   Drainage Description Serosanguinous   Odor None   Gianna-wound Assessment Hyperkeratosis (callous)   Margins Defined edges   Wound Thickness Description not for Pressure Injury Full thickness   Pain Assessment   Pain Assessment 0-10   Pain Level 9   Pain Location Shoulder   Pain Orientation Right   Pain Descriptors Aching;Discomfort  (needs shoulder replacement, waiting for surgery)     /85   Pulse 75   Temp 98.1 °F (36.7 °C) (Temporal)   Resp 16

## 2025-03-19 NOTE — PROGRESS NOTES
Wound Center  Progress Note     Date of Service: 3/19/25      Date of Initial Visit for Current Problem:  2/10/25     Subjective:      Chief Complaint:  Job Ruelas is a 64 y.o.  male who presents with Lt. foot plantar forefoot wound of about 14 weeks duration.     Referred by:  Dr. Trimble     HPI:   Has lost some toes, does not want to lose more. Was being treated by a podiatrist, but did not seem to be making progress.  Wound caused by: chronic pressure/irritation due to neuropathy/diabetes.  Current wound care:  Offloading wound: no  Appetite: good  Wound associated pain: none  Diabetic: Yes - last A1c = 5.4.  Smoker: No     Past Medical History           Past Medical History:   Diagnosis Date    Diabetes (HCC)      Hypertension           Past Surgical History             Past Surgical History:   Procedure Laterality Date    AMPUTATION        CARDIAC SURGERY             Family History             Family History   Problem Relation Age of Onset    Cancer Mother      Cancer Sister      Cancer Brother           Social History              Tobacco Use    Smoking status: Never    Smokeless tobacco: Never   Substance Use Topics    Alcohol use: Not Currently       Home Medications                 Prior to Admission medications    Medication Sig Start Date End Date Taking? Authorizing Provider   empagliflozin (JARDIANCE) 25 MG tablet TAKE ONE TABLET BY MOUTH DAILY AT 9AM 12/26/24   Yes Rayna Trimble APRN - NP   amiodarone (CORDARONE) 200 MG tablet TAKE ONE TABLET BY MOUTH DAILY AT 9AM 12/19/24   Yes Rochelle Delgadillo APRN - NP   diclofenac sodium (VOLTAREN) 1 % GEL APPLY 2 G TOPICALLY 4 TIMES A DAY 11/4/24   Yes Rayna Trimble APRN - NP   HYDROcodone-acetaminophen (NORCO)  MG per tablet TAKE 1/2 PILL EVERY 6 HOURS FOR PAIN RELIEF. DO NOT EXCEED 2 WHOLE PILLS A DAY 10/2/24   Yes Provider, MD Eula   atorvastatin (LIPITOR) 40 MG tablet TAKE 1 TABLET BY MOUTH EVERY DAY 10/2/24   Yes Nai

## 2025-03-19 NOTE — FLOWSHEET NOTE
03/19/25 0953   Wound 02/10/25 Foot Plantar;Left   Date First Assessed/Time First Assessed: 02/10/25 1425   Wound Approximate Age at First Assessment (Weeks): 12 weeks  Primary Wound Type: Diabetic Ulcer  Location: Foot  Wound Location Orientation: Plantar;Left   Dressing Status New dressing applied   Offloading for Diabetic Foot Ulcers Total contact cast  (window pane foam, collagen Ag, ABD)   Post-Procedure Length (cm) 0.4 cm  (Post Selective debridement & AgNO3)   Post-Procedure Width (cm) 0.4 cm  (Post Selective debridement & AgNO3)   Post-Procedure Depth (cm) 0.3 cm  (Post Selective debridement & AgNO3)   Post-Procedure Surface Area (cm^2) 0.16 cm^2   Post-Procedure Volume (cm^3) 0.048 cm^3     Total Contact Cast    NAME:  Job Ruelas  YOB: 1960  MEDICAL RECORD NUMBER:  [id number]  DATE:  3/19/2025    Goal:  Patient will maintain integrity of cast, avoid mobility hazards, and report complications that may occur (foul odor, pain, numbness, cracked cast).     Removed old contact cast if indicated and wash extremity with soap and water.  Applied ordered dressing.  Applied per   Applied in clinic to left lower leg.  Allow cast to dry.   Instructed patient to report to health care provider, including wound care center, any back pain, hip pain, or leg pain, numbness of toes, or any odor coming from the cast.   Instructed patient not to stick any foreign objects down into cast.  Instructed patient to utilize assistive devices(crutches, cane or walker) as ordered.  Instructed patient to continue offloading as directed.       Applied cast per  Guidelines    Electronically signed by Nella Hill RN on 3/19/2025 at 10:21 AM

## 2025-03-26 ENCOUNTER — HOSPITAL ENCOUNTER (OUTPATIENT)
Facility: HOSPITAL | Age: 65
Discharge: HOME | End: 2025-03-26
Attending: ORTHOPAEDIC SURGERY
Payer: MEDICARE

## 2025-03-26 VITALS
SYSTOLIC BLOOD PRESSURE: 151 MMHG | DIASTOLIC BLOOD PRESSURE: 90 MMHG | HEART RATE: 71 BPM | TEMPERATURE: 98.1 F | RESPIRATION RATE: 16 BRPM

## 2025-03-26 PROCEDURE — 29445 APPL RIGID TOT CNTC LEG CAST: CPT

## 2025-03-26 RX ORDER — BETAMETHASONE DIPROPIONATE 0.5 MG/G
CREAM TOPICAL PRN
OUTPATIENT
Start: 2025-03-26

## 2025-03-26 RX ORDER — LIDOCAINE 40 MG/G
CREAM TOPICAL PRN
OUTPATIENT
Start: 2025-03-26

## 2025-03-26 RX ORDER — GINSENG 100 MG
CAPSULE ORAL PRN
OUTPATIENT
Start: 2025-03-26

## 2025-03-26 RX ORDER — LIDOCAINE HYDROCHLORIDE 40 MG/ML
SOLUTION TOPICAL PRN
OUTPATIENT
Start: 2025-03-26

## 2025-03-26 RX ORDER — NEOMYCIN/BACITRACIN/POLYMYXINB 3.5-400-5K
OINTMENT (GRAM) TOPICAL PRN
OUTPATIENT
Start: 2025-03-26

## 2025-03-26 RX ORDER — LIDOCAINE HYDROCHLORIDE 20 MG/ML
JELLY TOPICAL PRN
OUTPATIENT
Start: 2025-03-26

## 2025-03-26 RX ORDER — GENTAMICIN SULFATE 1 MG/G
OINTMENT TOPICAL PRN
OUTPATIENT
Start: 2025-03-26

## 2025-03-26 RX ORDER — SODIUM CHLOR/HYPOCHLOROUS ACID 0.033 %
SOLUTION, IRRIGATION IRRIGATION PRN
OUTPATIENT
Start: 2025-03-26

## 2025-03-26 RX ORDER — MUPIROCIN 20 MG/G
OINTMENT TOPICAL PRN
OUTPATIENT
Start: 2025-03-26

## 2025-03-26 RX ORDER — TRIAMCINOLONE ACETONIDE 1 MG/G
OINTMENT TOPICAL PRN
OUTPATIENT
Start: 2025-03-26

## 2025-03-26 RX ORDER — CLOBETASOL PROPIONATE 0.5 MG/G
OINTMENT TOPICAL PRN
OUTPATIENT
Start: 2025-03-26

## 2025-03-26 RX ORDER — LIDOCAINE 50 MG/G
OINTMENT TOPICAL PRN
OUTPATIENT
Start: 2025-03-26

## 2025-03-26 RX ORDER — BACITRACIN ZINC AND POLYMYXIN B SULFATE 500; 1000 [USP'U]/G; [USP'U]/G
OINTMENT TOPICAL PRN
OUTPATIENT
Start: 2025-03-26

## 2025-03-26 NOTE — FLOWSHEET NOTE
03/26/25 1042   Wound 02/10/25 Foot Plantar;Left   Date First Assessed/Time First Assessed: 02/10/25 1425   Wound Approximate Age at First Assessment (Weeks): 12 weeks  Primary Wound Type: Diabetic Ulcer  Location: Foot  Wound Location Orientation: Plantar;Left   Offloading for Diabetic Foot Ulcers Total contact cast   Post-Procedure Length (cm) 0.1 cm   Post-Procedure Width (cm) 0.1 cm   Post-Procedure Depth (cm) 0.1 cm   Post-Procedure Surface Area (cm^2) 0.01 cm^2   Post-Procedure Volume (cm^3) 0.001 cm^3     Total Contact Cast    NAME:  Job Ruelas  YOB: 1960  MEDICAL RECORD NUMBER:  [id number]  DATE:  3/26/2025    Goal:  Patient will maintain integrity of cast, avoid mobility hazards, and report complications that may occur (foul odor, pain, numbness, cracked cast).     Removed old contact cast if indicated and wash extremity with soap and water.  Applied ordered dressing.  Applied per   Applied in clinic to left lower leg.  Allow cast to dry.   Instructed patient to report to health care provider, including wound care center, any back pain, hip pain, or leg pain, numbness of toes, or any odor coming from the cast.   Instructed patient not to stick any foreign objects down into cast.  Instructed patient to utilize assistive devices(crutches, cane or walker) as ordered.  Instructed patient to continue offloading as directed.       Applied cast per  Guidelines    Electronically signed by Rupa Vega LPN on 3/26/2025 at 11:39 AM

## 2025-03-26 NOTE — FLOWSHEET NOTE
03/26/25 0950   LLE Neurovascular Assessment   Capillary Refill Less than/Equal to 3 seconds   Color Appropriate for Ethnicity   Temperature Warm   L Pedal Pulse +2   Wound 02/10/25 Foot Plantar;Left   Date First Assessed/Time First Assessed: 02/10/25 1425   Wound Approximate Age at First Assessment (Weeks): 12 weeks  Primary Wound Type: Diabetic Ulcer  Location: Foot  Wound Location Orientation: Plantar;Left   Wound Image    Wound Etiology Diabetic   Dressing Status Old drainage noted   Wound Cleansed Soap and water   Offloading for Diabetic Foot Ulcers Total contact cast   Wound Length (cm) 0.1 cm   Wound Width (cm) 0.1 cm   Wound Depth (cm) 0.1 cm   Wound Surface Area (cm^2) 0.01 cm^2   Change in Wound Size % (l*w) 99.67   Wound Volume (cm^3) 0.001 cm^3   Wound Healing % 100   Wound Assessment Other (Comment)  (unable to visualize wound bed due to scab)   Drainage Amount Scant (moist but unmeasurable)   Drainage Description Serosanguinous   Odor None   Gianna-wound Assessment Hyperkeratosis (callous)   Margins Attached edges   Wound Thickness Description not for Pressure Injury Full thickness   Pain Assessment   Pain Assessment None - Denies Pain     BP (!) 151/90   Pulse 71   Temp 98.1 °F (36.7 °C) (Temporal)   Resp 16

## 2025-03-26 NOTE — PATIENT INSTRUCTIONS
Discharge Instructions [facility]  [address]   [address], [address]  [place] [address]   Telephone: [phone]     FAX [phone]    NAME:  Job Ruelas  YOB: 1960  MEDICAL RECORD NUMBER:  [id number]  DATE:  3/26/2025    CPT code:Total contact cast (97758)    WOUND CARE ORDERS:  Left foot, Plantar :Cleanse with soap and water or normal saline & gauze. Apply Foam Windowpane (Foam to extend across plantar area of forefoot) followed by Xeroform to wound bed. Cover with secondary dressing - ABD pad, followed by Apply total contact cast (cast applied by MD). Dressing to changed (freq) Once a week in clinic  and as needed for compromise. Follow up with provider in 1 Week(s).     TREATMENT ORDERS:    Elevate leg(s) above the level of the heart when sitting.   Avoid prolonged standing in one place.  Do not get dressing/cast wet.  Follow Diet as prescribed:   [] Diet as tolerated: [x] Calorie Diabetic Diet: Low carb and no Sugar [] No Added Salt:no more then 2,000 mg daily  [] Increase Protein: [] Limit the amount of liquid you are drinking and avoid drinking in between meals (limit to 2 quarts daily)     Return Appointment:  [x] Return Appointment: With Dr. Alcantar  in 6 days  [] Nurse Visit :   [] Ordered tests:    Electronically signed CHRISTIANO SHAH RN on 3/26/2025 at 10:43 AM     Wound Care Center Information: Should you experience any significant changes in your wound(s) or have questions about your wound care, please contact the [facility] at MONDAY - FRIDAY 8:00 am - 4:30.  If you need help with your wound outside these hours and cannot wait until we are again available, contact your PCP or go to the hospital emergency room.   PLEASE NOTE: IF YOU ARE UNABLE TO OBTAIN WOUND SUPPLIES, CONTINUE TO USE THE SUPPLIES YOU HAVE AVAILABLE UNTIL YOU ARE ABLE TO REACH US. IT IS MOST IMPORTANT TO KEEP THE WOUND COVERED AT ALL TIMES.     Physician

## 2025-03-26 NOTE — PROGRESS NOTES
WOUND CARE PROGRESS       Subjective:     Patient   Job Ruelas is a 64 y.o.  male who presents with Lt. foot plantar forefoot wound of about 14 weeks duration.     Referred by:  Dr. Trimble     HPI:   Has lost some toes, does not want to lose more. Was being treated by a podiatrist, but did not seem to be making progress.  Wound caused by: chronic pressure/irritation due to neuropathy/diabetes.  Current wound care:  Offloading wound: no  Appetite: good  Wound associated pain: none  Diabetic: Yes - last A1c = 5.4.  Smoker: No    Patient was being treated with a left TCC  25 0953    Wound 02/10/25 Foot Plantar;Left   Date First Assessed/Time First Assessed: 02/10/25 1425   Wound Approximate Age at First Assessment (Weeks): 12 weeks  Primary Wound Type: Diabetic Ulcer  Location: Foot  Wound Location Orientation: Plantar;Left   Dressing Status New dressing applied   Offloading for Diabetic Foot Ulcers Total contact cast  (window pane foam, collagen Ag, ABD)   Post-Procedure Length (cm) 0.4 cm  (Post Selective debridement & AgNO3)   Post-Procedure Width (cm) 0.4 cm  (Post Selective debridement & AgNO3)   Post-Procedure Depth (cm) 0.3 cm  (Post Selective debridement & AgNO3)   Post-Procedure Surface Area (cm^2) 0.16 cm^2   Post-Procedure Volume (cm^3) 0.048 cm^3      Total Contact Cast  Follow-up 2025: Patient left foot plantar wound almost completely healed with some callus, minimal open area, patient did not bring a regular shoe with him today and lives a good distance away.  Still working on best approach to obtaining specialized footwear for him to prevent this from happening again    Review of Systems   Constitutional:         See HPI   All other systems reviewed and are negative.      Objective:     Blood pressure (!) 151/90, pulse 71, temperature 98.1 °F (36.7 °C), temperature source Temporal, resp. rate 16.    Temp (24hrs), Av.1 °F (36.7 °C), Min:98.1 °F (36.7 °C), Max:98.1 °F

## 2025-03-27 ENCOUNTER — HOSPITAL ENCOUNTER (EMERGENCY)
Facility: HOSPITAL | Age: 65
Discharge: HOME | End: 2025-03-27
Attending: EMERGENCY MEDICINE
Payer: MEDICARE

## 2025-03-27 ENCOUNTER — APPOINTMENT (OUTPATIENT)
Facility: HOSPITAL | Age: 65
End: 2025-03-27
Payer: MEDICARE

## 2025-03-27 VITALS
DIASTOLIC BLOOD PRESSURE: 89 MMHG | BODY MASS INDEX: 22.6 KG/M2 | OXYGEN SATURATION: 95 % | HEART RATE: 65 BPM | WEIGHT: 166.6 LBS | TEMPERATURE: 98.1 F | RESPIRATION RATE: 20 BRPM | SYSTOLIC BLOOD PRESSURE: 138 MMHG

## 2025-03-27 DIAGNOSIS — M62.81 GENERALIZED MUSCLE WEAKNESS: ICD-10-CM

## 2025-03-27 DIAGNOSIS — E86.0 DEHYDRATION: ICD-10-CM

## 2025-03-27 DIAGNOSIS — R05.1 ACUTE COUGH: Primary | ICD-10-CM

## 2025-03-27 LAB
ALBUMIN SERPL-MCNC: 3.9 G/DL (ref 3.5–5)
ALBUMIN/GLOB SERPL: 1.1 (ref 1.1–2.2)
ALP SERPL-CCNC: 72 U/L (ref 45–117)
ALT SERPL-CCNC: 12 U/L (ref 12–78)
ANION GAP SERPL CALC-SCNC: 7 MMOL/L (ref 2–12)
APPEARANCE UR: CLEAR
AST SERPL-CCNC: 16 U/L (ref 15–37)
BACTERIA URNS QL MICRO: ABNORMAL /HPF
BASOPHILS # BLD: 0.02 K/UL (ref 0–0.1)
BASOPHILS NFR BLD: 0.4 % (ref 0–1)
BILIRUB SERPL-MCNC: 0.8 MG/DL (ref 0.2–1)
BILIRUB UR QL CFM: NEGATIVE
BUN SERPL-MCNC: 22 MG/DL (ref 6–20)
BUN/CREAT SERPL: 15 (ref 12–20)
CALCIUM SERPL-MCNC: 8.7 MG/DL (ref 8.5–10.1)
CHLORIDE SERPL-SCNC: 102 MMOL/L (ref 97–108)
CO2 SERPL-SCNC: 28 MMOL/L (ref 21–32)
COLOR UR: ABNORMAL
CREAT SERPL-MCNC: 1.51 MG/DL (ref 0.7–1.3)
DIFFERENTIAL METHOD BLD: ABNORMAL
EKG ATRIAL RATE: 58 BPM
EKG DIAGNOSIS: NORMAL
EKG P-R INTERVAL: 214 MS
EKG Q-T INTERVAL: 456 MS
EKG QRS DURATION: 86 MS
EKG QTC CALCULATION (BAZETT): 447 MS
EKG R AXIS: -29 DEGREES
EKG T AXIS: 2 DEGREES
EKG VENTRICULAR RATE: 58 BPM
EOSINOPHIL # BLD: 0.06 K/UL (ref 0–0.4)
EOSINOPHIL NFR BLD: 1.1 % (ref 0–7)
EPITH CASTS URNS QL MICRO: ABNORMAL /LPF
ERYTHROCYTE [DISTWIDTH] IN BLOOD BY AUTOMATED COUNT: 14.7 % (ref 11.5–14.5)
FLUAV RNA SPEC QL NAA+PROBE: NOT DETECTED
FLUBV RNA SPEC QL NAA+PROBE: NOT DETECTED
GLOBULIN SER CALC-MCNC: 3.5 G/DL (ref 2–4)
GLUCOSE SERPL-MCNC: 89 MG/DL (ref 65–100)
GLUCOSE UR STRIP.AUTO-MCNC: 500 MG/DL
HCT VFR BLD AUTO: 46.2 % (ref 36.6–50.3)
HGB BLD-MCNC: 15 G/DL (ref 12.1–17)
HGB UR QL STRIP: NEGATIVE
IMM GRANULOCYTES # BLD AUTO: 0.03 K/UL (ref 0–0.04)
IMM GRANULOCYTES NFR BLD AUTO: 0.5 % (ref 0–0.5)
KETONES UR QL STRIP.AUTO: NEGATIVE MG/DL
LEUKOCYTE ESTERASE UR QL STRIP.AUTO: NEGATIVE
LYMPHOCYTES # BLD: 1.35 K/UL (ref 0.8–3.5)
LYMPHOCYTES NFR BLD: 24.6 % (ref 12–49)
MAGNESIUM SERPL-MCNC: 1.9 MG/DL (ref 1.6–2.4)
MCH RBC QN AUTO: 29.4 PG (ref 26–34)
MCHC RBC AUTO-ENTMCNC: 32.5 G/DL (ref 30–36.5)
MCV RBC AUTO: 90.4 FL (ref 80–99)
MONOCYTES # BLD: 0.39 K/UL (ref 0–1)
MONOCYTES NFR BLD: 7.1 % (ref 5–13)
NEUTS SEG # BLD: 3.64 K/UL (ref 1.8–8)
NEUTS SEG NFR BLD: 66.3 % (ref 32–75)
NITRITE UR QL STRIP.AUTO: NEGATIVE
NRBC # BLD: 0 K/UL (ref 0–0.01)
NRBC BLD-RTO: 0 PER 100 WBC
NT PRO BNP: 976 PG/ML (ref 0–125)
PH UR STRIP: 5.5 (ref 5–8)
PLATELET # BLD AUTO: 142 K/UL (ref 150–400)
PMV BLD AUTO: 10.6 FL (ref 8.9–12.9)
POTASSIUM SERPL-SCNC: 4.3 MMOL/L (ref 3.5–5.1)
PROT SERPL-MCNC: 7.4 G/DL (ref 6.4–8.2)
PROT UR STRIP-MCNC: ABNORMAL MG/DL
RBC # BLD AUTO: 5.11 M/UL (ref 4.1–5.7)
RBC #/AREA URNS HPF: ABNORMAL /HPF (ref 0–5)
SARS-COV-2 RNA RESP QL NAA+PROBE: NOT DETECTED
SODIUM SERPL-SCNC: 137 MMOL/L (ref 136–145)
SOURCE: NORMAL
SP GR UR REFRACTOMETRY: 1.03 (ref 1–1.03)
TROPONIN I SERPL HS-MCNC: 49 NG/L (ref 0–76)
TROPONIN I SERPL HS-MCNC: 51 NG/L (ref 0–76)
URINE CULTURE IF INDICATED: ABNORMAL
UROBILINOGEN UR QL STRIP.AUTO: 1 EU/DL (ref 0.2–1)
WBC # BLD AUTO: 5.5 K/UL (ref 4.1–11.1)
WBC URNS QL MICRO: ABNORMAL /HPF (ref 0–4)

## 2025-03-27 PROCEDURE — 2580000003 HC RX 258: Performed by: EMERGENCY MEDICINE

## 2025-03-27 PROCEDURE — 87636 SARSCOV2 & INF A&B AMP PRB: CPT

## 2025-03-27 PROCEDURE — 83735 ASSAY OF MAGNESIUM: CPT

## 2025-03-27 PROCEDURE — 80053 COMPREHEN METABOLIC PANEL: CPT

## 2025-03-27 PROCEDURE — 99285 EMERGENCY DEPT VISIT HI MDM: CPT

## 2025-03-27 PROCEDURE — 36415 COLL VENOUS BLD VENIPUNCTURE: CPT

## 2025-03-27 PROCEDURE — 81001 URINALYSIS AUTO W/SCOPE: CPT

## 2025-03-27 PROCEDURE — 84484 ASSAY OF TROPONIN QUANT: CPT

## 2025-03-27 PROCEDURE — 83880 ASSAY OF NATRIURETIC PEPTIDE: CPT

## 2025-03-27 PROCEDURE — 96360 HYDRATION IV INFUSION INIT: CPT

## 2025-03-27 PROCEDURE — 85025 COMPLETE CBC W/AUTO DIFF WBC: CPT

## 2025-03-27 PROCEDURE — 71046 X-RAY EXAM CHEST 2 VIEWS: CPT

## 2025-03-27 RX ORDER — 0.9 % SODIUM CHLORIDE 0.9 %
1000 INTRAVENOUS SOLUTION INTRAVENOUS ONCE
Status: COMPLETED | OUTPATIENT
Start: 2025-03-27 | End: 2025-03-27

## 2025-03-27 RX ORDER — BENZONATATE 100 MG/1
100 CAPSULE ORAL 3 TIMES DAILY PRN
Qty: 21 CAPSULE | Refills: 0 | Status: SHIPPED | OUTPATIENT
Start: 2025-03-27 | End: 2025-04-03

## 2025-03-27 RX ORDER — ALBUTEROL SULFATE 90 UG/1
2 INHALANT RESPIRATORY (INHALATION) 4 TIMES DAILY PRN
Qty: 18 G | Refills: 0 | Status: SHIPPED | OUTPATIENT
Start: 2025-03-27

## 2025-03-27 RX ADMIN — SODIUM CHLORIDE 1000 ML: 9 INJECTION, SOLUTION INTRAVENOUS at 15:43

## 2025-03-27 NOTE — ED TRIAGE NOTES
Via EMS with \"flu like symptoms\" patient was dx with flu A on 3/17.   Patient reports congestion but states he has not taken any medication for it.

## 2025-03-28 ENCOUNTER — TELEPHONE (OUTPATIENT)
Age: 65
End: 2025-03-28

## 2025-03-28 DIAGNOSIS — E11.42 TYPE 2 DIABETES MELLITUS WITH DIABETIC POLYNEUROPATHY, WITHOUT LONG-TERM CURRENT USE OF INSULIN (HCC): ICD-10-CM

## 2025-03-28 DIAGNOSIS — F51.01 PRIMARY INSOMNIA: ICD-10-CM

## 2025-03-28 RX ORDER — EMPAGLIFLOZIN 25 MG/1
TABLET, FILM COATED ORAL
Qty: 90 TABLET | Refills: 1 | Status: SHIPPED | OUTPATIENT
Start: 2025-03-28

## 2025-03-28 RX ORDER — TRAZODONE HYDROCHLORIDE 50 MG/1
TABLET ORAL
Qty: 90 TABLET | Refills: 1 | Status: SHIPPED | OUTPATIENT
Start: 2025-03-28

## 2025-03-28 NOTE — TELEPHONE ENCOUNTER
Medication Refill Request    Job ARGENTINA Jessenia is requesting a refill of the following medication(s):     atorvastatin (LIPITOR) 40 MG tablet    amiodarone (CORDARONE) 200 MG tablet   apixaban (ELIQUIS) 5 MG TABS tablet   Please send refill to:     [facility] - [address] [facility] [address] - P [phone] - F [phone]  [address] [facility] [address]  [place] [address]  Phone: [phone] Fax: [phone]    [facility]/pharmacy #[id number] - [place], [place] - [address] [facility] [phone] - F [phone]  [address] [facility] [address]  Phone: [phone] Fax: [phone]    [id number] [place], [place] - [address] [facility] [phone] - F [phone] [address] [facility] [address]  Phone: [phone] Fax: [phone]

## 2025-03-28 NOTE — TELEPHONE ENCOUNTER
Patient states received all medication in the mail today.    Pt brought down to CT scan. Monitor room notified. Consent for IV contrast signed and brought in chart with pt.

## 2025-03-28 NOTE — ED PROVIDER NOTES
[facility] EMERGENCY DEPARTMENT  EMERGENCY DEPARTMENT ENCOUNTER       Pt Name: Job Ruelas  MRN: [id number]  Birthdate 1960  Date of evaluation: 3/27/2025  Provider: Nella Hooks MD   PCP: Rayna Trimble APRN - NP  Note Started: 4:39 PM EDT 3/28/25     CHIEF COMPLAINT       Chief Complaint   Patient presents with    Influenza        HISTORY OF PRESENT ILLNESS: 1 or more elements      History From: Patient  HPI Limitations: None     Job Ruelas is a 64 y.o. male with history of diabetes, obesity, hypertension CKD, who presents to the ED with chief complaint of lightheadedness, shortness of breath, cough, wheezing.  Patient was diagnosed with influenza last week at [place].  He had been having similar symptoms of cough, lightheadedness/dizziness, and wheezing for several weeks prior to that presentation.  He had fevers and chills last week but denies any over the past few days.  He does report nasal congestion/runny nose.  Reports some chest tightness.  Denies any abdominal pain, nausea, vomiting.  He is currently being treated for left lower extremity ulcer but saw his vascular surgeon yesterday.  REVIEW OF SYSTEMS      Review of Systems     Positives and Pertinent negatives as per HPI.    PAST HISTORY     Past Medical History:  Past Medical History:   Diagnosis Date    Diabetes (HCC)     Hypertension          Past Surgical History:  Past Surgical History:   Procedure Laterality Date    AMPUTATION      CARDIAC SURGERY         Family History:  Family History   Problem Relation Age of Onset    Cancer Mother     Cancer Sister     Cancer Brother        Social History:  Social History     Tobacco Use    Smoking status: Never    Smokeless tobacco: Never   Vaping Use    Vaping status: Never Used   Substance Use Topics    Alcohol use: Not Currently    Drug use: Never       Allergies:  Allergies   Allergen Reactions    Soap Hives     Patient is allergic to Sween body lotion, applied on face twice  [phone] - F [phone]  [address] [facility] [address]      Phone: [phone]   albuterol sulfate  (90 Base) MCG/ACT inhaler  benzonatate 100 MG capsule           DISCONTINUED MEDICATIONS:  Discharge Medication List as of 3/27/2025  4:26 PM          I am the Primary Clinician of Record.   Nella Hooks MD (electronically signed)      (Please note that parts of this dictation were completed with voice recognition software. Quite often unanticipated grammatical, syntax, homophones, and other interpretive errors are inadvertently transcribed by the computer software. Please disregards these errors. Please excuse any errors that have escaped final proofreading.)         Nella Hooks MD  03/28/25 5739

## 2025-03-31 LAB
EKG ATRIAL RATE: 58 BPM
EKG DIAGNOSIS: NORMAL
EKG P-R INTERVAL: 214 MS
EKG Q-T INTERVAL: 456 MS
EKG QRS DURATION: 86 MS
EKG QTC CALCULATION (BAZETT): 447 MS
EKG R AXIS: -29 DEGREES
EKG T AXIS: 2 DEGREES
EKG VENTRICULAR RATE: 58 BPM

## 2025-04-02 ENCOUNTER — HOSPITAL ENCOUNTER (OUTPATIENT)
Facility: HOSPITAL | Age: 65
Discharge: HOME | End: 2025-04-02
Attending: ORTHOPAEDIC SURGERY
Payer: MEDICARE

## 2025-04-02 VITALS
RESPIRATION RATE: 16 BRPM | DIASTOLIC BLOOD PRESSURE: 77 MMHG | TEMPERATURE: 98.9 F | SYSTOLIC BLOOD PRESSURE: 187 MMHG | HEART RATE: 70 BPM

## 2025-04-02 DIAGNOSIS — L97.522 DIABETIC ULCER OF TOE OF LEFT FOOT ASSOCIATED WITH DIABETES MELLITUS DUE TO UNDERLYING CONDITION, WITH FAT LAYER EXPOSED: Primary | ICD-10-CM

## 2025-04-02 DIAGNOSIS — E08.621 DIABETIC ULCER OF TOE OF LEFT FOOT ASSOCIATED WITH DIABETES MELLITUS DUE TO UNDERLYING CONDITION, WITH FAT LAYER EXPOSED: Primary | ICD-10-CM

## 2025-04-02 PROCEDURE — 99212 OFFICE O/P EST SF 10 MIN: CPT

## 2025-04-02 RX ORDER — BACITRACIN ZINC AND POLYMYXIN B SULFATE 500; 1000 [USP'U]/G; [USP'U]/G
OINTMENT TOPICAL PRN
OUTPATIENT
Start: 2025-04-02

## 2025-04-02 RX ORDER — LIDOCAINE 40 MG/G
CREAM TOPICAL PRN
OUTPATIENT
Start: 2025-04-02

## 2025-04-02 RX ORDER — BETAMETHASONE DIPROPIONATE 0.5 MG/G
CREAM TOPICAL PRN
OUTPATIENT
Start: 2025-04-02

## 2025-04-02 RX ORDER — LIDOCAINE HYDROCHLORIDE 20 MG/ML
JELLY TOPICAL PRN
OUTPATIENT
Start: 2025-04-02

## 2025-04-02 RX ORDER — LIDOCAINE 50 MG/G
OINTMENT TOPICAL PRN
OUTPATIENT
Start: 2025-04-02

## 2025-04-02 RX ORDER — CLOBETASOL PROPIONATE 0.5 MG/G
OINTMENT TOPICAL PRN
OUTPATIENT
Start: 2025-04-02

## 2025-04-02 RX ORDER — TRIAMCINOLONE ACETONIDE 1 MG/G
OINTMENT TOPICAL PRN
OUTPATIENT
Start: 2025-04-02

## 2025-04-02 RX ORDER — SODIUM CHLOR/HYPOCHLOROUS ACID 0.033 %
SOLUTION, IRRIGATION IRRIGATION PRN
OUTPATIENT
Start: 2025-04-02

## 2025-04-02 RX ORDER — GINSENG 100 MG
CAPSULE ORAL PRN
OUTPATIENT
Start: 2025-04-02

## 2025-04-02 RX ORDER — GENTAMICIN SULFATE 1 MG/G
OINTMENT TOPICAL PRN
OUTPATIENT
Start: 2025-04-02

## 2025-04-02 RX ORDER — NEOMYCIN/BACITRACIN/POLYMYXINB 3.5-400-5K
OINTMENT (GRAM) TOPICAL PRN
OUTPATIENT
Start: 2025-04-02

## 2025-04-02 RX ORDER — LIDOCAINE HYDROCHLORIDE 40 MG/ML
SOLUTION TOPICAL PRN
OUTPATIENT
Start: 2025-04-02

## 2025-04-02 RX ORDER — MUPIROCIN 20 MG/G
OINTMENT TOPICAL PRN
OUTPATIENT
Start: 2025-04-02

## 2025-04-02 ASSESSMENT — PAIN SCALES - GENERAL: PAINLEVEL_OUTOF10: 6

## 2025-04-02 ASSESSMENT — PAIN DESCRIPTION - DESCRIPTORS: DESCRIPTORS: ACHING;DISCOMFORT

## 2025-04-02 ASSESSMENT — PAIN DESCRIPTION - LOCATION: LOCATION: SHOULDER

## 2025-04-02 ASSESSMENT — PAIN DESCRIPTION - ORIENTATION: ORIENTATION: RIGHT

## 2025-04-02 NOTE — PATIENT INSTRUCTIONS
Discharge Instructions/Wound Care Orders  [facility]  [address]   [address]  [place] [address]   Telephone: [phone]    FAX [phone]      NAME:  Job Ruelas  YOB: 1960  MEDICAL RECORD NUMBER:  [id number]  DATE:  4/2/2025    CPT code: E&M-Level 2 (04470)    Congratulations!  Your Left Foot Wound Has Healed & You have completed your treatment.     Return to your Primary Care Physician for all your health issues.   Take your medications as prescribed by your primary care physician.   Check your skin daily for cracks, bruises, sores, or dryness. Use a moisturizer as needed.   Clean and dry your skin, using mild soap and warm water (not hot).   Avoid alcohol and caffeine and do not smoke.  Maintain a nutritious diet.  Avoid pressure on your wound site. Keep your legs elevated above the level of the heart whenever possible.  Other: Get customized insoles/Diabetic shoes as soon as possible. Inspect feet every day (before putting on shoes & when removing shoes )    THANK YOU FOR ALLOWING US TO SERVE YOU.  PLEASE CALL IF YOU DEVELOP ANOTHER WOUND.     Electronically signed by CHRISTIANO SHAH RN on 4/2/2025 at 10:39 AM     Wound Care Center Information: Should you experience any significant changes in your wound(s) or have questions about your wound care, please contact the [facility] at MONDAY - FRIDAY 8:00 am - 4:30.  If you need help with your wound outside these hours and cannot wait until we are again available, contact your PCP or go to the hospital emergency room.     PLEASE NOTE: IF YOU ARE UNABLE TO OBTAIN WOUND SUPPLIES, CONTINUE TO USE THE SUPPLIES YOU HAVE AVAILABLE UNTIL YOU ARE ABLE TO REACH US. IT IS MOST IMPORTANT TO KEEP THE WOUND COVERED AT ALL TIMES.     Physician Signature:_______________________    Date: ___________ Time:  ____________

## 2025-04-02 NOTE — PROGRESS NOTES
Wound Center  Progress Note     Date of Service: 4/2/25      Date of Initial Visit for Current Problem:  2/10/25     Subjective:      Chief Complaint:  Job Ruelas is a 64 y.o.  male who presents with Lt. foot plantar forefoot wound of about 14 weeks duration.     Referred by:  Dr. Trimble     HPI:   Has lost some toes, does not want to lose more. Was being treated by a podiatrist, but did not seem to be making progress.  Wound caused by: chronic pressure/irritation due to neuropathy/diabetes.  Current wound care:  Offloading wound: no  Appetite: good  Wound associated pain: none  Diabetic: Yes - last A1c = 5.4.  Smoker: No     Past Medical History           Past Medical History:   Diagnosis Date    Diabetes (HCC)      Hypertension           Past Surgical History             Past Surgical History:   Procedure Laterality Date    AMPUTATION        CARDIAC SURGERY             Family History             Family History   Problem Relation Age of Onset    Cancer Mother      Cancer Sister      Cancer Brother           Social History              Tobacco Use    Smoking status: Never    Smokeless tobacco: Never   Substance Use Topics    Alcohol use: Not Currently       Home Medications                 Prior to Admission medications    Medication Sig Start Date End Date Taking? Authorizing Provider   empagliflozin (JARDIANCE) 25 MG tablet TAKE ONE TABLET BY MOUTH DAILY AT 9AM 12/26/24   Yes Rayna Trimble APRN - NP   amiodarone (CORDARONE) 200 MG tablet TAKE ONE TABLET BY MOUTH DAILY AT 9AM 12/19/24   Yes Rochelle Delgadillo APRN - NP   diclofenac sodium (VOLTAREN) 1 % GEL APPLY 2 G TOPICALLY 4 TIMES A DAY 11/4/24   Yes Rayna Trimble APRN - NP   HYDROcodone-acetaminophen (NORCO)  MG per tablet TAKE 1/2 PILL EVERY 6 HOURS FOR PAIN RELIEF. DO NOT EXCEED 2 WHOLE PILLS A DAY 10/2/24   Yes Provider, MD Eula   atorvastatin (LIPITOR) 40 MG tablet TAKE 1 TABLET BY MOUTH EVERY DAY 10/2/24   Yes Nai

## 2025-04-02 NOTE — FLOWSHEET NOTE
04/02/25 1050   [REMOVED] Wound 02/10/25 Foot Plantar;Left   Final Assessment Date/Final Assessment Time: 04/02/25 1013  Date First Assessed/Time First Assessed: 02/10/25 1425   Wound Approximate Age at First Assessment (Weeks): 12 weeks  Primary Wound Type: Diabetic Ulcer  Location: Foot  Wound Location [place]...   Dressing/Treatment Other (comment)  (no dressing needed)

## 2025-04-02 NOTE — FLOWSHEET NOTE
04/02/25 1013   LLE Neurovascular Assessment   Capillary Refill Less than/Equal to 3 seconds   Color Appropriate for Ethnicity   Temperature Warm   L Pedal Pulse +2   Wound 02/10/25 Foot Plantar;Left   Date First Assessed/Time First Assessed: 02/10/25 1425   Wound Approximate Age at First Assessment (Weeks): 12 weeks  Primary Wound Type: Diabetic Ulcer  Location: Foot  Wound Location Orientation: Plantar;Left   Wound Image    Wound Etiology Diabetic   Dressing Status Old drainage noted   Wound Cleansed Soap and water   Offloading for Diabetic Foot Ulcers Total contact cast   Wound Length (cm) 0.1 cm   Wound Width (cm) 0.1 cm   Wound Depth (cm) 0.1 cm   Wound Surface Area (cm^2) 0.01 cm^2   Change in Wound Size % (l*w) 99.67   Wound Volume (cm^3) 0.001 cm^3   Wound Healing % 100   Wound Assessment Other (Comment)  (very small scab)   Drainage Amount Scant (moist but unmeasurable)   Drainage Description Serosanguinous   Odor None   Gianna-wound Assessment Hyperkeratosis (callous)   Margins Attached edges   Wound Thickness Description not for Pressure Injury Full thickness   Pain Assessment   Pain Assessment 0-10   Pain Level 6   Pain Location Shoulder  (needs surgery on shoulder)   Pain Orientation Right   Pain Descriptors Aching;Discomfort     BP (!) 187/77 Comment: patient states he will check BP today when he gets home, he checks it daily but has had some stressful events occur this morning  Pulse 70   Temp 98.9 °F (37.2 °C) (Temporal)   Resp 16

## 2025-04-18 ENCOUNTER — TELEPHONE (OUTPATIENT)
Age: 65
End: 2025-04-18

## 2025-04-18 ENCOUNTER — OFFICE VISIT (OUTPATIENT)
Age: 65
End: 2025-04-18
Payer: MEDICARE

## 2025-04-18 VITALS
SYSTOLIC BLOOD PRESSURE: 126 MMHG | HEART RATE: 82 BPM | OXYGEN SATURATION: 96 % | DIASTOLIC BLOOD PRESSURE: 72 MMHG | BODY MASS INDEX: 34.45 KG/M2 | TEMPERATURE: 98.2 F | WEIGHT: 254 LBS | RESPIRATION RATE: 20 BRPM

## 2025-04-18 DIAGNOSIS — E11.621 DIABETIC ULCER OF LEFT MIDFOOT ASSOCIATED WITH TYPE 2 DIABETES MELLITUS, WITH MUSCLE INVOLVEMENT WITHOUT EVIDENCE OF NECROSIS: Primary | ICD-10-CM

## 2025-04-18 DIAGNOSIS — L97.425 DIABETIC ULCER OF LEFT MIDFOOT ASSOCIATED WITH TYPE 2 DIABETES MELLITUS, WITH MUSCLE INVOLVEMENT WITHOUT EVIDENCE OF NECROSIS: Primary | ICD-10-CM

## 2025-04-18 DIAGNOSIS — Z12.11 ENCOUNTER FOR SCREENING COLONOSCOPY: ICD-10-CM

## 2025-04-18 DIAGNOSIS — R63.4 WEIGHT LOSS: ICD-10-CM

## 2025-04-18 DIAGNOSIS — N18.32 STAGE 3B CHRONIC KIDNEY DISEASE (HCC): ICD-10-CM

## 2025-04-18 DIAGNOSIS — R06.83 HABITUAL SNORING: ICD-10-CM

## 2025-04-18 PROCEDURE — G8417 CALC BMI ABV UP PARAM F/U: HCPCS

## 2025-04-18 PROCEDURE — 1159F MED LIST DOCD IN RCRD: CPT

## 2025-04-18 PROCEDURE — 3017F COLORECTAL CA SCREEN DOC REV: CPT

## 2025-04-18 PROCEDURE — 99214 OFFICE O/P EST MOD 30 MIN: CPT

## 2025-04-18 PROCEDURE — 1123F ACP DISCUSS/DSCN MKR DOCD: CPT

## 2025-04-18 PROCEDURE — G8427 DOCREV CUR MEDS BY ELIG CLIN: HCPCS

## 2025-04-18 PROCEDURE — 2022F DILAT RTA XM EVC RTNOPTHY: CPT

## 2025-04-18 PROCEDURE — 3046F HEMOGLOBIN A1C LEVEL >9.0%: CPT

## 2025-04-18 PROCEDURE — 1126F AMNT PAIN NOTED NONE PRSNT: CPT

## 2025-04-18 PROCEDURE — 1036F TOBACCO NON-USER: CPT

## 2025-04-18 ASSESSMENT — ENCOUNTER SYMPTOMS
CONSTIPATION: 0
EYES NEGATIVE: 1
BLOOD IN STOOL: 0
WHEEZING: 0
RESPIRATORY NEGATIVE: 1
COUGH: 0
DIARRHEA: 0
SHORTNESS OF BREATH: 0
ALLERGIC/IMMUNOLOGIC NEGATIVE: 1

## 2025-04-18 NOTE — PROGRESS NOTES
\"Have you been to the ER, urgent care clinic since your last visit?  Hospitalized since your last visit?\"    NO    “Have you seen or consulted any other health care providers outside our system since your last visit?”    NO    “Have you had a colorectal cancer screening such as a colonoscopy/FIT/Cologuard?    NO    No colonoscopy on file  No cologuard on file  No FIT/FOBT on file   No flexible sigmoidoscopy on file     “Have you had a diabetic eye exam?”    NO     No diabetic eye exam on file       Chief Complaint   Patient presents with    Other     Order diabetic ulcer shoes        Vitals:    04/18/25 1037   BP: 126/72   Pulse: 82   Resp: 20   Temp: 98.2 °F (36.8 °C)   SpO2: 96%

## 2025-04-18 NOTE — PATIENT INSTRUCTIONS
Dr. Pili Mccallum  [address] [place] [address] [phone]    [facility]  [address] [place] [address] [phone]

## 2025-04-18 NOTE — PROGRESS NOTES
Chief Complaint   Patient presents with    Other     Order diabetic ulcer shoes        HPI:      History of Present Illness  The patient is a 64-year-old male who presents for evaluation of weight loss, left foot wound, sleep apnea, and vision issues.    Unintentional weight loss has been noted, with the current weight at 254 pounds, down from 260 pounds. No changes in dietary habits or physical activity levels are reported. Appetite has decreased, but the same types of food are consumed, albeit in smaller quantities. An active lifestyle is maintained, with regular exercise and walking without any shortness of breath. Weight history includes 320 pounds two years ago, decreasing to 300 pounds, then 280 pounds, and most recently 264 pounds a month ago.    Diabetic foot ulcer managed by wound specialist had closed, but returned last week when the cast was removed. A follow-up visit with wound specialist is scheduled for Wednesday. Specialized footwear has been advised to prevent further complications, but difficulties in obtaining these shoes due to financial constraints and insurance issues are reported. An appointment with the wound care specialist is scheduled for 04/23/2025.    A history of cataracts in both eyes is reported, with the right eye being more severely affected. Scheduling an appointment with the ophthalmologist has been problematic due to insurance issues.    Securing an appointment with the nephrologist has been unsuccessful despite multiple attempts. An upcoming appointment with the cardiologist is scheduled for 05/22/2025.    A colonoscopy has never been undergone, and a request for one is made at this time.    He reports habitual snoring with observed apneic spells; he has never had a sleep study.      Allergies   Allergen Reactions    Soap Hives     Patient is allergic to Sween body lotion, applied on face twice during hospital stay, with extensive erythema on face. Improving with topical

## 2025-04-23 ENCOUNTER — HOSPITAL ENCOUNTER (OUTPATIENT)
Facility: HOSPITAL | Age: 65
Discharge: HOME | End: 2025-04-23
Attending: ORTHOPAEDIC SURGERY
Payer: MEDICARE

## 2025-04-23 VITALS — HEIGHT: 72 IN | WEIGHT: 254 LBS | BODY MASS INDEX: 34.4 KG/M2

## 2025-04-23 VITALS — TEMPERATURE: 97.9 F | SYSTOLIC BLOOD PRESSURE: 138 MMHG | DIASTOLIC BLOOD PRESSURE: 65 MMHG | HEART RATE: 73 BPM

## 2025-04-23 DIAGNOSIS — E08.621 DIABETIC ULCER OF TOE OF LEFT FOOT ASSOCIATED WITH DIABETES MELLITUS DUE TO UNDERLYING CONDITION, WITH FAT LAYER EXPOSED (HCC): Primary | ICD-10-CM

## 2025-04-23 DIAGNOSIS — L97.522 DIABETIC ULCER OF TOE OF LEFT FOOT ASSOCIATED WITH DIABETES MELLITUS DUE TO UNDERLYING CONDITION, WITH FAT LAYER EXPOSED (HCC): Primary | ICD-10-CM

## 2025-04-23 PROCEDURE — 11042 DBRDMT SUBQ TIS 1ST 20SQCM/<: CPT

## 2025-04-23 PROCEDURE — 29445 APPL RIGID TOT CNTC LEG CAST: CPT

## 2025-04-23 RX ORDER — BETAMETHASONE DIPROPIONATE 0.5 MG/G
CREAM TOPICAL PRN
OUTPATIENT
Start: 2025-04-23

## 2025-04-23 RX ORDER — CLOBETASOL PROPIONATE 0.5 MG/G
OINTMENT TOPICAL PRN
OUTPATIENT
Start: 2025-04-23

## 2025-04-23 RX ORDER — LIDOCAINE HYDROCHLORIDE 20 MG/ML
JELLY TOPICAL PRN
OUTPATIENT
Start: 2025-04-23

## 2025-04-23 RX ORDER — GENTAMICIN SULFATE 1 MG/G
OINTMENT TOPICAL PRN
OUTPATIENT
Start: 2025-04-23

## 2025-04-23 RX ORDER — NEOMYCIN/BACITRACIN/POLYMYXINB 3.5-400-5K
OINTMENT (GRAM) TOPICAL PRN
OUTPATIENT
Start: 2025-04-23

## 2025-04-23 RX ORDER — LIDOCAINE HYDROCHLORIDE 40 MG/ML
SOLUTION TOPICAL PRN
OUTPATIENT
Start: 2025-04-23

## 2025-04-23 RX ORDER — SODIUM CHLOR/HYPOCHLOROUS ACID 0.033 %
SOLUTION, IRRIGATION IRRIGATION PRN
OUTPATIENT
Start: 2025-04-23

## 2025-04-23 RX ORDER — LIDOCAINE 50 MG/G
OINTMENT TOPICAL PRN
OUTPATIENT
Start: 2025-04-23

## 2025-04-23 RX ORDER — TRIAMCINOLONE ACETONIDE 1 MG/G
OINTMENT TOPICAL PRN
OUTPATIENT
Start: 2025-04-23

## 2025-04-23 RX ORDER — LIDOCAINE 40 MG/G
CREAM TOPICAL PRN
OUTPATIENT
Start: 2025-04-23

## 2025-04-23 RX ORDER — BACITRACIN ZINC AND POLYMYXIN B SULFATE 500; 1000 [USP'U]/G; [USP'U]/G
OINTMENT TOPICAL PRN
OUTPATIENT
Start: 2025-04-23

## 2025-04-23 RX ORDER — MUPIROCIN 20 MG/G
OINTMENT TOPICAL PRN
OUTPATIENT
Start: 2025-04-23

## 2025-04-23 RX ORDER — GINSENG 100 MG
CAPSULE ORAL PRN
OUTPATIENT
Start: 2025-04-23

## 2025-04-23 ASSESSMENT — SLEEP AND FATIGUE QUESTIONNAIRES
HOW LIKELY ARE YOU TO NOD OFF OR FALL ASLEEP WHILE SITTING QUIETLY AFTER LUNCH WITHOUT ALCOHOL: WOULD NEVER DOZE
HOW LIKELY ARE YOU TO NOD OFF OR FALL ASLEEP WHILE SITTING AND READING: WOULD NEVER DOZE
HOW LIKELY ARE YOU TO NOD OFF OR FALL ASLEEP IN A CAR, WHILE STOPPED FOR A FEW MINUTES IN TRAFFIC: WOULD NEVER DOZE
ESS TOTAL SCORE: 3
HOW LIKELY ARE YOU TO NOD OFF OR FALL ASLEEP WHILE WATCHING TV: SLIGHT CHANCE OF DOZING
HOW LIKELY ARE YOU TO NOD OFF OR FALL ASLEEP WHILE LYING DOWN TO REST IN THE AFTERNOON WHEN CIRCUMSTANCES PERMIT: MODERATE CHANCE OF DOZING
HOW LIKELY ARE YOU TO NOD OFF OR FALL ASLEEP WHILE SITTING INACTIVE IN A PUBLIC PLACE: WOULD NEVER DOZE
HOW LIKELY ARE YOU TO NOD OFF OR FALL ASLEEP WHILE SITTING AND TALKING TO SOMEONE: WOULD NEVER DOZE
HOW LIKELY ARE YOU TO NOD OFF OR FALL ASLEEP WHEN YOU ARE A PASSENGER IN A CAR FOR AN HOUR WITHOUT A BREAK: WOULD NEVER DOZE

## 2025-04-23 ASSESSMENT — PAIN SCALES - GENERAL: PAINLEVEL_OUTOF10: 8

## 2025-04-23 ASSESSMENT — PAIN DESCRIPTION - LOCATION: LOCATION: SHOULDER

## 2025-04-23 ASSESSMENT — PAIN DESCRIPTION - ORIENTATION: ORIENTATION: RIGHT

## 2025-04-23 ASSESSMENT — PAIN DESCRIPTION - DESCRIPTORS: DESCRIPTORS: ACHING;DISCOMFORT;SHOOTING

## 2025-04-23 NOTE — PROGRESS NOTES
Wound Center  Progress Note     Date of Service: 4/23/25      Date of Initial Visit for Current Problem:  4/23/25 - ulcer recurrence after 3 weeks.     Subjective:      Chief Complaint:  Job Ruelas is a 64 y.o.  male who presents with Lt. foot plantar forefoot wound of about 1 weeks duration.     Referred by:  Dr. Trimble     HPI:   Has lost some toes, does not want to lose more. Was being treated by a podiatrist, but did not seem to be making progress.  Wound caused by: chronic pressure/irritation due to neuropathy/diabetes.  Current wound care:  Offloading wound: no  Appetite: good  Wound associated pain: none  Diabetic: Yes - last A1c = 5.4.  Smoker: No     Past Medical History           Past Medical History:   Diagnosis Date    Diabetes (HCC)      Hypertension           Past Surgical History             Past Surgical History:   Procedure Laterality Date    AMPUTATION        CARDIAC SURGERY             Family History             Family History   Problem Relation Age of Onset    Cancer Mother      Cancer Sister      Cancer Brother           Social History              Tobacco Use    Smoking status: Never    Smokeless tobacco: Never   Substance Use Topics    Alcohol use: Not Currently       Home Medications                 Prior to Admission medications    Medication Sig Start Date End Date Taking? Authorizing Provider   empagliflozin (JARDIANCE) 25 MG tablet TAKE ONE TABLET BY MOUTH DAILY AT 9AM 12/26/24   Yes Rayna Trimble APRN - NP   amiodarone (CORDARONE) 200 MG tablet TAKE ONE TABLET BY MOUTH DAILY AT 9AM 12/19/24   Yes Rochelle Delgadillo APRN - NP   diclofenac sodium (VOLTAREN) 1 % GEL APPLY 2 G TOPICALLY 4 TIMES A DAY 11/4/24   Yes Rayna Trimble APRN - NP   HYDROcodone-acetaminophen (NORCO)  MG per tablet TAKE 1/2 PILL EVERY 6 HOURS FOR PAIN RELIEF. DO NOT EXCEED 2 WHOLE PILLS A DAY 10/2/24   Yes Provider, MD Eula   atorvastatin (LIPITOR) 40 MG tablet TAKE 1 TABLET BY

## 2025-04-23 NOTE — PATIENT INSTRUCTIONS
Discharge Instructions [facility]  [address]   [address], [address]  [place] [address]   Telephone: [phone]     FAX [phone]    NAME:  Job Ruelas  YOB: 1960  MEDICAL RECORD NUMBER:  [id number]  DATE:  4/23/2025    CPT code: 31091    WOUND CARE ORDERS:  Left foot, Plantar :Cleanse with soap and water or normal saline & gauze. Apply Foam Windowpane (Foam to extend across plantar area of forefoot) followed by Xeroform to wound bed. Cover with secondary dressing - ABD pad, followed by Apply total contact cast (cast applied by MD). Dressing to changed Once a week in clinic .    TREATMENT ORDERS:    Elevate leg(s) above the level of the heart when sitting.   Avoid prolonged standing in one place.  Do not get dressing/cast wet.    Follow Diet as prescribed:   [] Diet as tolerated: [x] Calorie Diabetic Diet: Low carb and no Sugar [] No Added Salt:no more then 2,000 mg daily  [] Increase Protein: [] Limit the amount of liquid you are drinking and avoid drinking in between meals (limit to 2 quarts daily)     Return Appointment:  [x] Return Appointment: With Dr. Arnulfo Gan in 1 week.  [] Nurse Visit :   [] Ordered tests:    Electronically signed Usha Talavera RN on 4/23/2025 at 8:14 AM     Wound Care Center Information: Should you experience any significant changes in your wound(s) or have questions about your wound care, please contact the [facility] at MONDAY - FRIDAY 8:00 am - 4:30.  If you need help with your wound outside these hours and cannot wait until we are again available, contact your PCP or go to the hospital emergency room.   PLEASE NOTE: IF YOU ARE UNABLE TO OBTAIN WOUND SUPPLIES, CONTINUE TO USE THE SUPPLIES YOU HAVE AVAILABLE UNTIL YOU ARE ABLE TO REACH US. IT IS MOST IMPORTANT TO KEEP THE WOUND COVERED AT ALL TIMES.     Physician Signature:_______________________    Date: ___________ Time:  ____________

## 2025-04-23 NOTE — FLOWSHEET NOTE
04/23/25 0952   LLE Neurovascular Assessment   Capillary Refill Less than/Equal to 3 seconds   Color Appropriate for Ethnicity   Temperature Warm   L Pedal Pulse +2   Wound 04/23/25 Foot Plantar;Left   Date First Assessed/Time First Assessed: 04/23/25 0956   Wound Approximate Age at First Assessment (Weeks): 3 weeks  Primary Wound Type: Diabetic Ulcer  Location: Foot  Wound Location Orientation: Plantar;Left   Wound Image    Wound Etiology Diabetic   Dressing Status Old drainage noted   Wound Cleansed Cleansed with saline   Offloading for Diabetic Foot Ulcers Offloading not ordered   Wound Length (cm) 1.2 cm   Wound Width (cm) 1.1 cm   Wound Depth (cm) 0.6 cm   Wound Surface Area (cm^2) 1.32 cm^2   Change in Wound Size % (l*w) 56   Wound Volume (cm^3) 0.792 cm^3   Wound Healing % 74   Undermining Starts ___ O'Clock 12  (could be callous)   Undermining Ends___ O'Clock 12   Undermining Maxium Distance (cm) 0.7   Wound Assessment Pink/red   Drainage Amount Moderate (25-50%)   Drainage Description Serosanguinous   Odor None   Gianna-wound Assessment Hyperkeratosis (callous)   Margins Defined edges   Wound Thickness Description not for Pressure Injury Full thickness   Pain Assessment   Pain Assessment 0-10   Pain Level 8   Pain Location Shoulder   Pain Orientation Right   Pain Descriptors Aching;Discomfort;Shooting     /65   Pulse 73   Temp 97.9 °F (36.6 °C) (Temporal)

## 2025-04-23 NOTE — FLOWSHEET NOTE
04/23/25 1018   Wound 04/23/25 Foot Plantar;Left   Date First Assessed/Time First Assessed: 04/23/25 0956   Wound Approximate Age at First Assessment (Weeks): 3 weeks  Primary Wound Type: Diabetic Ulcer  Location: Foot  Wound Location Orientation: Plantar;Left   Dressing/Treatment Xeroform;ABD  (TCC by MD)   Post-Procedure Width (cm) 1.4 cm     Total Contact Cast    NAME:  Job Ruelas  YOB: 1960  MEDICAL RECORD NUMBER:  [id number]  DATE:  4/23/2025    Goal:  Patient will maintain integrity of cast, avoid mobility hazards, and report complications that may occur (foul odor, pain, numbness, cracked cast).     Removed old contact cast if indicated and wash extremity with soap and water.  Applied ordered dressing.  Applied per   Applied in clinic to left lower leg.  Allow cast to dry.   Instructed patient to report to health care provider, including wound care center, any back pain, hip pain, or leg pain, numbness of toes, or any odor coming from the cast.   Instructed patient not to stick any foreign objects down into cast.  Instructed patient to utilize assistive devices(crutches, cane or walker) as ordered.  Instructed patient to continue offloading as directed.       Applied cast per  Guidelines    Electronically signed by Rupa Vega LPN on 4/23/2025 at 11:04 AM

## 2025-04-24 ENCOUNTER — TELEMEDICINE (OUTPATIENT)
Age: 65
End: 2025-04-24
Payer: MEDICARE

## 2025-04-24 DIAGNOSIS — I25.10 CORONARY ARTERY DISEASE INVOLVING NATIVE CORONARY ARTERY OF NATIVE HEART WITHOUT ANGINA PECTORIS: ICD-10-CM

## 2025-04-24 DIAGNOSIS — E11.40 TYPE 2 DIABETES MELLITUS WITH DIABETIC NEUROPATHY, WITHOUT LONG-TERM CURRENT USE OF INSULIN (HCC): ICD-10-CM

## 2025-04-24 DIAGNOSIS — G47.33 OBSTRUCTIVE SLEEP APNEA (ADULT) (PEDIATRIC): Primary | ICD-10-CM

## 2025-04-24 PROCEDURE — 99204 OFFICE O/P NEW MOD 45 MIN: CPT | Performed by: INTERNAL MEDICINE

## 2025-04-24 NOTE — PATIENT INSTRUCTIONS
label. Alcohol naturally makes you sleepy and on its own can cause accidents. Combined with excessive drowsiness its effects are amplified.   Signs of Drowsy Driving:   * You don't remember driving the last few miles   * You may drift out of your garcia   * You are unable to focus and your thoughts wander   * You may yawn more often than normal   * You have difficulty keeping your eyes open / nodding off   * Missing traffic signs, speeding, or tailgating  Prevention-   Good sleep hygiene, lifestyle and behavioral choices have the most impact on drowsy driving. There is no substitute for sleep and the average person requires 8 hours nightly. If you find yourself driving drowsy, stop and sleep. Consider the sleep hygiene tips provided during your visit as well.     Medication Refill Policy: Refills for all medications require 1 week advance notice. Please have your pharmacy fax a refill request. We are unable to fax, or call in \"controled substance\" medications and you will need to pick these prescriptions up from our office.

## 2025-04-24 NOTE — PROGRESS NOTES
Job Ruelas, was evaluated through a synchronous (real-time) audio-video encounter. The patient (or guardian if applicable) is aware that this is a billable service, which includes applicable co-pays. This Virtual Visit was conducted with patient's (and/or legal guardian's) consent. Patient identification was verified, and a caregiver was present when appropriate.   The patient was located at Home: [address] [place] [id number]  Provider was located at Home (Appt Dept State): [place]  Confirm you are appropriately licensed, registered, or certified to deliver care in the state where the patient is located as indicated above. If you are not or unsure, please re-schedule the visit: Yes, I confirm.      Total time spent for this encounter: Not billed by time    --Mayra Hall MD on 4/24/2025 at 3:29 PM    An electronic signature was used to authenticate this note.         Patient called and identity confirmed with 2 patient identifers     Job Ruelas is a 64 y.o. male who was seen by synchronous (real-time) audio-video technology on 4/24/2025.           --Mayra Hall MD on 4/24/2025 at 3:29 PM                                 [address]  [place] [address]  Tel.  [phone]  Fax. [phone] [address]  [place] [address]  Tel.  [phone]  Fax. [phone] [address] [place] [address]  Tel.  [phone]  Fax. [phone]         Subjective:             Job Ruelas is an 64 y.o. male self-referred for evaluation for a sleep disorder.       He complains of snoring, snorting, choking, periods of not breathing associated with excessive daytime sleepiness.  Symptoms began several years ago, gradually worsening since that time. He usually can fall asleep in variable minutes.  Family or house members note snoring, snorting, choking, periods of not breathing. He denies falling asleep while talking.  .  Job Ruelas does wake up frequently at night. He

## 2025-04-25 ENCOUNTER — HOSPITAL ENCOUNTER (OUTPATIENT)
Facility: HOSPITAL | Age: 65
Discharge: HOME | End: 2025-04-28
Payer: MEDICARE

## 2025-04-25 DIAGNOSIS — R63.4 WEIGHT LOSS: ICD-10-CM

## 2025-04-25 PROCEDURE — 74176 CT ABD & PELVIS W/O CONTRAST: CPT

## 2025-04-27 ENCOUNTER — RESULTS FOLLOW-UP (OUTPATIENT)
Age: 65
End: 2025-04-27

## 2025-04-29 ENCOUNTER — TELEPHONE (OUTPATIENT)
Age: 65
End: 2025-04-29

## 2025-04-29 NOTE — TELEPHONE ENCOUNTER
Attempted to contact patient to schedule colonoscopy. No answer, voicemail full, unable to leave a message.

## 2025-04-30 ENCOUNTER — TELEPHONE (OUTPATIENT)
Age: 65
End: 2025-04-30

## 2025-04-30 ENCOUNTER — HOSPITAL ENCOUNTER (OUTPATIENT)
Facility: HOSPITAL | Age: 65
Discharge: HOME | End: 2025-04-30
Attending: ORTHOPAEDIC SURGERY
Payer: MEDICARE

## 2025-04-30 ENCOUNTER — PREP FOR PROCEDURE (OUTPATIENT)
Age: 65
End: 2025-04-30

## 2025-04-30 VITALS
HEART RATE: 72 BPM | SYSTOLIC BLOOD PRESSURE: 138 MMHG | RESPIRATION RATE: 18 BRPM | TEMPERATURE: 97.6 F | DIASTOLIC BLOOD PRESSURE: 77 MMHG

## 2025-04-30 DIAGNOSIS — Z12.11 SCREENING FOR COLON CANCER: ICD-10-CM

## 2025-04-30 DIAGNOSIS — L97.522 DIABETIC ULCER OF TOE OF LEFT FOOT ASSOCIATED WITH DIABETES MELLITUS DUE TO UNDERLYING CONDITION, WITH FAT LAYER EXPOSED (HCC): Primary | ICD-10-CM

## 2025-04-30 DIAGNOSIS — E08.621 DIABETIC ULCER OF TOE OF LEFT FOOT ASSOCIATED WITH DIABETES MELLITUS DUE TO UNDERLYING CONDITION, WITH FAT LAYER EXPOSED (HCC): Primary | ICD-10-CM

## 2025-04-30 PROCEDURE — 99213 OFFICE O/P EST LOW 20 MIN: CPT | Performed by: SURGERY

## 2025-04-30 PROCEDURE — 29445 APPL RIGID TOT CNTC LEG CAST: CPT

## 2025-04-30 PROCEDURE — 29445 APPL RIGID TOT CNTC LEG CAST: CPT | Performed by: SURGERY

## 2025-04-30 RX ORDER — TRIAMCINOLONE ACETONIDE 1 MG/G
OINTMENT TOPICAL PRN
OUTPATIENT
Start: 2025-04-30

## 2025-04-30 RX ORDER — GENTAMICIN SULFATE 1 MG/G
OINTMENT TOPICAL PRN
OUTPATIENT
Start: 2025-04-30

## 2025-04-30 RX ORDER — SODIUM CHLOR/HYPOCHLOROUS ACID 0.033 %
SOLUTION, IRRIGATION IRRIGATION PRN
OUTPATIENT
Start: 2025-04-30

## 2025-04-30 RX ORDER — CLOBETASOL PROPIONATE 0.5 MG/G
OINTMENT TOPICAL PRN
OUTPATIENT
Start: 2025-04-30

## 2025-04-30 RX ORDER — MUPIROCIN 20 MG/G
OINTMENT TOPICAL PRN
OUTPATIENT
Start: 2025-04-30

## 2025-04-30 RX ORDER — GINSENG 100 MG
CAPSULE ORAL PRN
OUTPATIENT
Start: 2025-04-30

## 2025-04-30 RX ORDER — LIDOCAINE HYDROCHLORIDE 40 MG/ML
SOLUTION TOPICAL PRN
OUTPATIENT
Start: 2025-04-30

## 2025-04-30 RX ORDER — LIDOCAINE HYDROCHLORIDE 20 MG/ML
JELLY TOPICAL PRN
OUTPATIENT
Start: 2025-04-30

## 2025-04-30 RX ORDER — LIDOCAINE 40 MG/G
CREAM TOPICAL PRN
OUTPATIENT
Start: 2025-04-30

## 2025-04-30 RX ORDER — NEOMYCIN/BACITRACIN/POLYMYXINB 3.5-400-5K
OINTMENT (GRAM) TOPICAL PRN
OUTPATIENT
Start: 2025-04-30

## 2025-04-30 RX ORDER — LIDOCAINE 50 MG/G
OINTMENT TOPICAL PRN
OUTPATIENT
Start: 2025-04-30

## 2025-04-30 RX ORDER — BETAMETHASONE DIPROPIONATE 0.5 MG/G
CREAM TOPICAL PRN
OUTPATIENT
Start: 2025-04-30

## 2025-04-30 RX ORDER — BACITRACIN ZINC AND POLYMYXIN B SULFATE 500; 1000 [USP'U]/G; [USP'U]/G
OINTMENT TOPICAL PRN
OUTPATIENT
Start: 2025-04-30

## 2025-04-30 NOTE — PATIENT INSTRUCTIONS
Discharge Instructions [facility]  [address]   [address]  [place] [address]   Telephone: [phone]     FAX [phone]    NAME:  Job Ruelas  YOB: 1960  MEDICAL RECORD NUMBER:  [id number]  DATE:  4/30/2025    CPT code:Total contact cast (65628)     WOUND CARE ORDERS:  Left foot, Plantar :Cleanse with soap and water or normal saline & gauze. Apply Foam Windowpane (Foam to extend across plantar area of forefoot). Apply Silver Collagen to wound bed. Cover with secondary dressings -   Calcium Alginate & ABD pad, followed by Apply total contact cast (cast applied by MD). Dressing to changed Once a week in clinic .    TREATMENT ORDERS:    Elevate leg(s) above the level of the heart when sitting.   Avoid prolonged standing in one place.  Do not get dressing/cast wet.  Never walk on cast without Cast Boot.    Follow Diet as prescribed:   [] Diet as tolerated: [x] Calorie Diabetic Diet: Low carb and no Sugar [] No Added Salt:no more then 2,000 mg daily  [] Increase Protein: [] Limit the amount of liquid you are drinking and avoid drinking in between meals (limit to 2 quarts daily)     Return Appointment:  [x] Return Appointment: With Dr. Arnulfo Gan in 1 week.  [] Nurse Visit :   [] Ordered tests:    Electronically signed CHRISTIANO SHAH RN on 4/30/2025 at 10:33 AM     Wound Care Center Information: Should you experience any significant changes in your wound(s) or have questions about your wound care, please contact the [facility] at MONDAY - FRIDAY 8:00 am - 4:30.  If you need help with your wound outside these hours and cannot wait until we are again available, contact your PCP or go to the hospital emergency room.   PLEASE NOTE: IF YOU ARE UNABLE TO OBTAIN WOUND SUPPLIES, CONTINUE TO USE THE SUPPLIES YOU HAVE AVAILABLE UNTIL YOU ARE ABLE TO REACH US. IT IS MOST IMPORTANT TO KEEP THE WOUND COVERED AT ALL TIMES.     Physician

## 2025-04-30 NOTE — TELEPHONE ENCOUNTER
Contacted patient back to schedule colonoscopy. Patient accepted 7/11. Notified patient of arrival time and stated that I will send a surgical letter with prep information via [employer] and home address. Patient thanked me for the call.

## 2025-04-30 NOTE — TELEPHONE ENCOUNTER
The diabetic shoe  used by [facility] does not accept patient's insurance ([employer] and Medicaid.) Advised patient [facility] does accept insurance and patient does not need a referral. [facility] contact info given to patient and he will call to schedule tomorrow.

## 2025-04-30 NOTE — FLOWSHEET NOTE
04/30/25 1050   Wound 04/23/25 Foot Plantar;Left   Date First Assessed/Time First Assessed: 04/23/25 0956   Wound Approximate Age at First Assessment (Weeks): 3 weeks  Primary Wound Type: Diabetic Ulcer  Location: Foot  Wound Location Orientation: Plantar;Left   Dressing/Treatment Collagen with Ag;Alginate;ABD  (TCC by MD)

## 2025-04-30 NOTE — PROGRESS NOTES
dorsalis pedis pulse wound smaller no debridement, central area of epidermis      04/30/25 1001   LLE Neurovascular Assessment   Capillary Refill Less than/Equal to 3 seconds   Color Appropriate for Ethnicity   Temperature Warm   L Pedal Pulse +2   Wound 04/23/25 Foot Plantar;Left   Date First Assessed/Time First Assessed: 04/23/25 0956   Wound Approximate Age at First Assessment (Weeks): 3 weeks  Primary Wound Type: Diabetic Ulcer  Location: Foot  Wound Location Orientation: Plantar;Left   Wound Image    Wound Etiology Diabetic   Dressing Status Old drainage noted   Wound Cleansed Soap and water   Offloading for Diabetic Foot Ulcers Total contact cast   Wound Length (cm) 1 cm   Wound Width (cm) 0.8 cm   Wound Depth (cm) 0.4 cm   Wound Surface Area (cm^2) 0.8 cm^2   Change in Wound Size % (l*w) 73.33   Wound Volume (cm^3) 0.32 cm^3   Wound Healing % 89   Wound Assessment [place]/red;Slough   Drainage Amount Large (50-75% saturated)   Drainage Description Serous   Odor None   Gianna-wound Assessment Hyperkeratosis (callous)   Margins Defined edges   Wound Thickness Description not for Pressure Injury Full thickness   Pain Assessment   Pain Assessment None - Denies Pain      /77   Pulse 72   Temp 97.6 °F (36.4 °C) (Temporal)   Resp 18                           Labs: No results found for this or any previous visit (from the past 24 hours).    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 *      Plan/Recommendations/Medical Decision Making:     Dressing: Silver collagen and alginate, window over ulcer, ABD, TCC-EZ applied in usual manner. Care discussed.   Frequency: weekly    Procedure explained to patient; questions were answered. Consent was obtained.  Left leg and foot were washed

## 2025-05-07 ENCOUNTER — TELEPHONE (OUTPATIENT)
Age: 65
End: 2025-05-07

## 2025-05-07 ENCOUNTER — HOSPITAL ENCOUNTER (OUTPATIENT)
Facility: HOSPITAL | Age: 65
Discharge: HOME | End: 2025-05-07
Attending: ORTHOPAEDIC SURGERY
Payer: MEDICARE

## 2025-05-07 VITALS
RESPIRATION RATE: 18 BRPM | DIASTOLIC BLOOD PRESSURE: 55 MMHG | TEMPERATURE: 98.9 F | HEART RATE: 73 BPM | SYSTOLIC BLOOD PRESSURE: 127 MMHG

## 2025-05-07 DIAGNOSIS — L97.522 DIABETIC ULCER OF TOE OF LEFT FOOT ASSOCIATED WITH DIABETES MELLITUS DUE TO UNDERLYING CONDITION, WITH FAT LAYER EXPOSED (HCC): Primary | ICD-10-CM

## 2025-05-07 DIAGNOSIS — E08.621 DIABETIC ULCER OF TOE OF LEFT FOOT ASSOCIATED WITH DIABETES MELLITUS DUE TO UNDERLYING CONDITION, WITH FAT LAYER EXPOSED (HCC): Primary | ICD-10-CM

## 2025-05-07 PROCEDURE — 29445 APPL RIGID TOT CNTC LEG CAST: CPT

## 2025-05-07 PROCEDURE — 97597 DBRDMT OPN WND 1ST 20 CM/<: CPT

## 2025-05-07 RX ORDER — NEOMYCIN/BACITRACIN/POLYMYXINB 3.5-400-5K
OINTMENT (GRAM) TOPICAL PRN
OUTPATIENT
Start: 2025-05-07

## 2025-05-07 RX ORDER — LIDOCAINE HYDROCHLORIDE 20 MG/ML
JELLY TOPICAL PRN
OUTPATIENT
Start: 2025-05-07

## 2025-05-07 RX ORDER — TRIAMCINOLONE ACETONIDE 1 MG/G
OINTMENT TOPICAL PRN
OUTPATIENT
Start: 2025-05-07

## 2025-05-07 RX ORDER — CLOBETASOL PROPIONATE 0.5 MG/G
OINTMENT TOPICAL PRN
OUTPATIENT
Start: 2025-05-07

## 2025-05-07 RX ORDER — SODIUM CHLOR/HYPOCHLOROUS ACID 0.033 %
SOLUTION, IRRIGATION IRRIGATION PRN
OUTPATIENT
Start: 2025-05-07

## 2025-05-07 RX ORDER — MUPIROCIN 20 MG/G
OINTMENT TOPICAL PRN
OUTPATIENT
Start: 2025-05-07

## 2025-05-07 RX ORDER — LIDOCAINE 40 MG/G
CREAM TOPICAL PRN
OUTPATIENT
Start: 2025-05-07

## 2025-05-07 RX ORDER — GINSENG 100 MG
CAPSULE ORAL PRN
OUTPATIENT
Start: 2025-05-07

## 2025-05-07 RX ORDER — LIDOCAINE 50 MG/G
OINTMENT TOPICAL PRN
OUTPATIENT
Start: 2025-05-07

## 2025-05-07 RX ORDER — BETAMETHASONE DIPROPIONATE 0.5 MG/G
CREAM TOPICAL PRN
OUTPATIENT
Start: 2025-05-07

## 2025-05-07 RX ORDER — LIDOCAINE HYDROCHLORIDE 40 MG/ML
SOLUTION TOPICAL PRN
OUTPATIENT
Start: 2025-05-07

## 2025-05-07 RX ORDER — GENTAMICIN SULFATE 1 MG/G
OINTMENT TOPICAL PRN
OUTPATIENT
Start: 2025-05-07

## 2025-05-07 RX ORDER — BACITRACIN ZINC AND POLYMYXIN B SULFATE 500; 1000 [USP'U]/G; [USP'U]/G
OINTMENT TOPICAL PRN
OUTPATIENT
Start: 2025-05-07

## 2025-05-07 ASSESSMENT — PAIN DESCRIPTION - LOCATION: LOCATION: SHOULDER

## 2025-05-07 ASSESSMENT — PAIN SCALES - GENERAL: PAINLEVEL_OUTOF10: 6

## 2025-05-07 NOTE — PROGRESS NOTES
Wound Center  Progress Note     Date of Service: 5/7/25      Date of Initial Visit for Current Problem:  4/23/25 - ulcer recurrence after 3 weeks.     Subjective:      Chief Complaint:  Job Ruelas is a 64 y.o.  male who presents with Lt. foot plantar forefoot wound of about 1 weeks duration.     Referred by:  Dr. Trimble     HPI:   Has lost some toes, does not want to lose more. Was being treated by a podiatrist, but did not seem to be making progress.  Wound caused by: chronic pressure/irritation due to neuropathy/diabetes.  Current wound care:  Offloading wound: no  Appetite: good  Wound associated pain: none  Diabetic: Yes - last A1c = 5.4.  Smoker: No     Past Medical History           Past Medical History:   Diagnosis Date    Diabetes (HCC)      Hypertension           Past Surgical History             Past Surgical History:   Procedure Laterality Date    AMPUTATION        CARDIAC SURGERY             Family History             Family History   Problem Relation Age of Onset    Cancer Mother      Cancer Sister      Cancer Brother           Social History              Tobacco Use    Smoking status: Never    Smokeless tobacco: Never   Substance Use Topics    Alcohol use: Not Currently       Home Medications                 Prior to Admission medications    Medication Sig Start Date End Date Taking? Authorizing Provider   empagliflozin (JARDIANCE) 25 MG tablet TAKE ONE TABLET BY MOUTH DAILY AT 9AM 12/26/24   Yes Rayna Trimble APRN - NP   amiodarone (CORDARONE) 200 MG tablet TAKE ONE TABLET BY MOUTH DAILY AT 9AM 12/19/24   Yes Rochelle Delgadillo APRN - NP   diclofenac sodium (VOLTAREN) 1 % GEL APPLY 2 G TOPICALLY 4 TIMES A DAY 11/4/24   Yes Rayna Trimble APRN - NP   HYDROcodone-acetaminophen (NORCO)  MG per tablet TAKE 1/2 PILL EVERY 6 HOURS FOR PAIN RELIEF. DO NOT EXCEED 2 WHOLE PILLS A DAY 10/2/24   Yes Provider, MD Eula   atorvastatin (LIPITOR) 40 MG tablet TAKE 1 TABLET BY MOUTH

## 2025-05-07 NOTE — PATIENT INSTRUCTIONS
Discharge Instructions [facility]  [address]   [address]  [place] [address]   Telephone: [phone]     FAX [phone]    NAME:  Job Ruelas  YOB: 1960  MEDICAL RECORD NUMBER:  [id number]  DATE:  5/7/2025    CPT code:Debridement selective (84526) and Total contact cast (69730)     WOUND CARE ORDERS:  Left foot, Plantar :Cleanse with soap and water or normal saline & gauze. Apply Foam Windowpane (Foam to extend across plantar area of forefoot). Apply Silver Collagen to wound bed. Cover with secondary dressings -   Calcium Alginate & ABD pad, followed by Apply total contact cast (cast applied by MD). Dressing to changed Once a week in clinic .    TREATMENT ORDERS:    Elevate leg(s) above the level of the heart when sitting.   Avoid prolonged standing in one place.  Do not get dressing/cast wet.  Never walk on cast without Cast Boot.    Follow Diet as prescribed:   [] Diet as tolerated: [x] Calorie Diabetic Diet: Low carb and no Sugar [] No Added Salt:no more then 2,000 mg daily  [] Increase Protein: [] Limit the amount of liquid you are drinking and avoid drinking in between meals (limit to 2 quarts daily)     Return Appointment:  [x] Return Appointment: With Dr. Arnulfo Gan in 1 week.  [] Nurse Visit :   [] Ordered tests:    Electronically signed CHRISTIANO SHAH RN on 5/7/2025 at 10:33 AM     Wound Care Center Information: Should you experience any significant changes in your wound(s) or have questions about your wound care, please contact the [facility] at MONDAY - FRIDAY 8:00 am - 4:30.  If you need help with your wound outside these hours and cannot wait until we are again available, contact your PCP or go to the hospital emergency room.   PLEASE NOTE: IF YOU ARE UNABLE TO OBTAIN WOUND SUPPLIES, CONTINUE TO USE THE SUPPLIES YOU HAVE AVAILABLE UNTIL YOU ARE ABLE TO REACH US. IT IS MOST IMPORTANT TO KEEP THE WOUND COVERED AT ALL

## 2025-05-07 NOTE — FLOWSHEET NOTE
05/07/25 1034   Wound 04/23/25 Foot Plantar;Left   Date First Assessed/Time First Assessed: 04/23/25 0956   Wound Approximate Age at First Assessment (Weeks): 3 weeks  Primary Wound Type: Diabetic Ulcer  Location: Foot  Wound Location Orientation: Plantar;Left   Dressing/Treatment Collagen with Ag;Alginate;ABD;Foam   Offloading for Diabetic Foot Ulcers Total contact cast   Post-Procedure Length (cm) 1.1 cm   Post-Procedure Width (cm) 1.1 cm   Post-Procedure Depth (cm) 0.6 cm   Post-Procedure Surface Area (cm^2) 1.21 cm^2   Post-Procedure Volume (cm^3) 0.726 cm^3     Total Contact Cast    NAME:  Job Ruelas  YOB: 1960  MEDICAL RECORD NUMBER:  [id number]  DATE:  5/7/2025    Goal:  Patient will maintain integrity of cast, avoid mobility hazards, and report complications that may occur (foul odor, pain, numbness, cracked cast).     Removed old contact cast if indicated and wash extremity with soap and water.  Applied ordered dressing.  Applied per   Applied in clinic to left lower leg.  Allow cast to dry.   Instructed patient to report to health care provider, including wound care center, any back pain, hip pain, or leg pain, numbness of toes, or any odor coming from the cast.   Instructed patient not to stick any foreign objects down into cast.  Instructed patient to utilize assistive devices(crutches, cane or walker) as ordered.  Instructed patient to continue offloading as directed.       Applied cast per  Guidelines    Electronically signed by Rupa Vega LPN on 5/7/2025 at 11:29 AM

## 2025-05-07 NOTE — TELEPHONE ENCOUNTER
Records faxed to [facility] per requests from [facility]. Confimation received.

## 2025-05-07 NOTE — FLOWSHEET NOTE
05/07/25 1000   LLE Neurovascular Assessment   Capillary Refill Less than/Equal to 3 seconds   Color Appropriate for Ethnicity   Temperature Warm   L Pedal Pulse +2   Wound 04/23/25 Foot Plantar;Left   Date First Assessed/Time First Assessed: 04/23/25 0956   Wound Approximate Age at First Assessment (Weeks): 3 weeks  Primary Wound Type: Diabetic Ulcer  Location: Foot  Wound Location Orientation: Plantar;Left   Wound Image    Wound Etiology Diabetic   Dressing Status Old drainage noted   Wound Cleansed Soap and water   Offloading for Diabetic Foot Ulcers Total contact cast   Wound Length (cm) 0.9 cm   Wound Width (cm) 0.7 cm   Wound Depth (cm) 0.5 cm   Wound Surface Area (cm^2) 0.63 cm^2   Change in Wound Size % (l*w) 79   Wound Volume (cm^3) 0.315 cm^3   Wound Healing % 90   Wound Assessment Pink/red   Drainage Amount Scant (moist but unmeasurable)   Drainage Description Serous   Odor None   Gianna-wound Assessment Hyperkeratosis (callous)   Margins Defined edges   Wound Thickness Description not for Pressure Injury Full thickness   Pain Assessment   Pain Assessment 0-10   Pain Level 6   Pain Location Shoulder     BP (!) 127/55   Pulse 73   Temp 98.9 °F (37.2 °C) (Temporal)   Resp 18

## 2025-05-14 ENCOUNTER — HOSPITAL ENCOUNTER (OUTPATIENT)
Facility: HOSPITAL | Age: 65
Discharge: HOME | End: 2025-05-14
Attending: ORTHOPAEDIC SURGERY
Payer: MEDICARE

## 2025-05-14 VITALS
RESPIRATION RATE: 18 BRPM | SYSTOLIC BLOOD PRESSURE: 157 MMHG | HEART RATE: 74 BPM | TEMPERATURE: 98.7 F | DIASTOLIC BLOOD PRESSURE: 70 MMHG

## 2025-05-14 DIAGNOSIS — E08.621 DIABETIC ULCER OF TOE OF LEFT FOOT ASSOCIATED WITH DIABETES MELLITUS DUE TO UNDERLYING CONDITION, WITH FAT LAYER EXPOSED (HCC): Primary | ICD-10-CM

## 2025-05-14 DIAGNOSIS — L97.522 DIABETIC ULCER OF TOE OF LEFT FOOT ASSOCIATED WITH DIABETES MELLITUS DUE TO UNDERLYING CONDITION, WITH FAT LAYER EXPOSED (HCC): Primary | ICD-10-CM

## 2025-05-14 PROCEDURE — 11042 DBRDMT SUBQ TIS 1ST 20SQCM/<: CPT

## 2025-05-14 PROCEDURE — 11055 PARING/CUTG B9 HYPRKER LES 1: CPT

## 2025-05-14 PROCEDURE — 29445 APPL RIGID TOT CNTC LEG CAST: CPT

## 2025-05-14 RX ORDER — TRIAMCINOLONE ACETONIDE 1 MG/G
OINTMENT TOPICAL PRN
OUTPATIENT
Start: 2025-05-14

## 2025-05-14 RX ORDER — BACITRACIN ZINC AND POLYMYXIN B SULFATE 500; 1000 [USP'U]/G; [USP'U]/G
OINTMENT TOPICAL PRN
OUTPATIENT
Start: 2025-05-14

## 2025-05-14 RX ORDER — MUPIROCIN 20 MG/G
OINTMENT TOPICAL PRN
OUTPATIENT
Start: 2025-05-14

## 2025-05-14 RX ORDER — GENTAMICIN SULFATE 1 MG/G
OINTMENT TOPICAL PRN
OUTPATIENT
Start: 2025-05-14

## 2025-05-14 RX ORDER — NEOMYCIN/BACITRACIN/POLYMYXINB 3.5-400-5K
OINTMENT (GRAM) TOPICAL PRN
OUTPATIENT
Start: 2025-05-14

## 2025-05-14 RX ORDER — BETAMETHASONE DIPROPIONATE 0.5 MG/G
CREAM TOPICAL PRN
OUTPATIENT
Start: 2025-05-14

## 2025-05-14 RX ORDER — LIDOCAINE HYDROCHLORIDE 40 MG/ML
SOLUTION TOPICAL PRN
OUTPATIENT
Start: 2025-05-14

## 2025-05-14 RX ORDER — LIDOCAINE 40 MG/G
CREAM TOPICAL PRN
OUTPATIENT
Start: 2025-05-14

## 2025-05-14 RX ORDER — LIDOCAINE HYDROCHLORIDE 20 MG/ML
JELLY TOPICAL PRN
OUTPATIENT
Start: 2025-05-14

## 2025-05-14 RX ORDER — SODIUM CHLOR/HYPOCHLOROUS ACID 0.033 %
SOLUTION, IRRIGATION IRRIGATION PRN
OUTPATIENT
Start: 2025-05-14

## 2025-05-14 RX ORDER — LIDOCAINE 50 MG/G
OINTMENT TOPICAL PRN
OUTPATIENT
Start: 2025-05-14

## 2025-05-14 RX ORDER — GINSENG 100 MG
CAPSULE ORAL PRN
OUTPATIENT
Start: 2025-05-14

## 2025-05-14 RX ORDER — CLOBETASOL PROPIONATE 0.5 MG/G
OINTMENT TOPICAL PRN
OUTPATIENT
Start: 2025-05-14

## 2025-05-14 ASSESSMENT — PAIN SCALES - GENERAL: PAINLEVEL_OUTOF10: 0

## 2025-05-14 NOTE — PROGRESS NOTES
Wound Center  Progress Note     Date of Service: 5/14/25      Date of Initial Visit for Current Problem:  4/23/25 - ulcer recurrence after 3 weeks.     Subjective:      Chief Complaint:  Job Ruelas is a 64 y.o.  male who presents with Lt. foot plantar forefoot wound of about 1 weeks duration.     Referred by:  Dr. Trimble     HPI:   Has lost some toes, does not want to lose more. Was being treated by a podiatrist, but did not seem to be making progress.  Wound caused by: chronic pressure/irritation due to neuropathy/diabetes.  Current wound care:  Offloading wound: no  Appetite: good  Wound associated pain: none  Diabetic: Yes - last A1c = 5.4.  Smoker: No     Past Medical History           Past Medical History:   Diagnosis Date    Diabetes (HCC)      Hypertension           Past Surgical History             Past Surgical History:   Procedure Laterality Date    AMPUTATION        CARDIAC SURGERY             Family History             Family History   Problem Relation Age of Onset    Cancer Mother      Cancer Sister      Cancer Brother           Social History              Tobacco Use    Smoking status: Never    Smokeless tobacco: Never   Substance Use Topics    Alcohol use: Not Currently       Home Medications                 Prior to Admission medications    Medication Sig Start Date End Date Taking? Authorizing Provider   empagliflozin (JARDIANCE) 25 MG tablet TAKE ONE TABLET BY MOUTH DAILY AT 9AM 12/26/24   Yes Rayna Trimble APRN - NP   amiodarone (CORDARONE) 200 MG tablet TAKE ONE TABLET BY MOUTH DAILY AT 9AM 12/19/24   Yes Rochelle Delgadillo APRN - NP   diclofenac sodium (VOLTAREN) 1 % GEL APPLY 2 G TOPICALLY 4 TIMES A DAY 11/4/24   Yes Rayna Trimble APRN - NP   HYDROcodone-acetaminophen (NORCO)  MG per tablet TAKE 1/2 PILL EVERY 6 HOURS FOR PAIN RELIEF. DO NOT EXCEED 2 WHOLE PILLS A DAY 10/2/24   Yes Provider, MD Eula   atorvastatin (LIPITOR) 40 MG tablet TAKE 1 TABLET BY

## 2025-05-14 NOTE — PATIENT INSTRUCTIONS
Discharge Instructions [facility]  [address]   [address], [address]  [place] [address]   Telephone: [phone]     FAX [phone]    NAME:  Job Ruelas  YOB: 1960  MEDICAL RECORD NUMBER:  [id number]  DATE:  5/14/2025    CPT code:Debridement selective (99059) and Total contact cast (76513)     WOUND CARE ORDERS:  Left medial foot - Apply Xeroform  Left foot, Plantar :Cleanse with soap and water or normal saline & gauze. Apply Foam Windowpane (Foam to extend across plantar area of forefoot). Apply Silver Collagen to wound bed. Cover with secondary dressings -   Calcium Alginate & ABD pad, followed by Apply total contact cast (cast applied by MD). Dressing to changed 1 x week, in clinic.    TREATMENT ORDERS:    Elevate leg(s) above the level of the heart when sitting.   Avoid prolonged standing in one place.  Do not get dressing/cast wet.  Never walk on cast without Cast Boot.    Follow Diet as prescribed:   [] Diet as tolerated: [x] Calorie Diabetic Diet: Low carb and no Sugar [] No Added Salt:no more then 2,000 mg daily  [] Increase Protein: [] Limit the amount of liquid you are drinking and avoid drinking in between meals (limit to 2 quarts daily)     Return Appointment:  [x] Return Appointment: With Dr. Arnulfo Gan Monday May 19th  [] Nurse Visit :   [] Ordered tests:    Electronically signed CHRISTIANO SHAH RN on 5/14/2025 at 10:21 AM     Wound Care Center Information: Should you experience any significant changes in your wound(s) or have questions about your wound care, please contact the [facility] at MONDAY - FRIDAY 8:00 am - 4:30.  If you need help with your wound outside these hours and cannot wait until we are again available, contact your PCP or go to the hospital emergency room.   PLEASE NOTE: IF YOU ARE UNABLE TO OBTAIN WOUND SUPPLIES, CONTINUE TO USE THE SUPPLIES YOU HAVE AVAILABLE UNTIL YOU ARE ABLE TO REACH US. IT IS MOST

## 2025-05-14 NOTE — FLOWSHEET NOTE
05/14/25 1002   LLE Neurovascular Assessment   Capillary Refill Less than/Equal to 3 seconds   Color Appropriate for Ethnicity   Temperature Warm   L Pedal Pulse +2   Wound 04/23/25 Foot Plantar;Left   Date First Assessed/Time First Assessed: 04/23/25 0956   Wound Approximate Age at First Assessment (Weeks): 3 weeks  Primary Wound Type: Diabetic Ulcer  Location: Foot  Wound Location Orientation: Plantar;Left   Wound Image    Wound Etiology Diabetic   Dressing Status Old drainage noted   Wound Cleansed Soap and water   Offloading for Diabetic Foot Ulcers Total contact cast   Wound Length (cm) 0.7 cm   Wound Width (cm) 0.6 cm   Wound Depth (cm) 0.3 cm   Wound Surface Area (cm^2) 0.42 cm^2   Change in Wound Size % (l*w) 86   Wound Volume (cm^3) 0.126 cm^3   Wound Healing % 96   Wound Assessment [place]/red;Slough   Drainage Amount Moderate (25-50%)   Drainage Description Serous   Odor None   Gianna-wound Assessment Hyperkeratosis (callous)   Margins Defined edges   Wound Thickness Description not for Pressure Injury Full thickness   Wound 05/14/25 Foot Medial;Left   Date First Assessed/Time First Assessed: 05/14/25 1011   Present on Original Admission: No  Wound Approximate Age at First Assessment (Weeks): 1 weeks  Primary Wound Type: Pressure Injury  Location: Foot  Wound Location Orientation: Medial;Left   Wound Image    Wound Etiology Pressure Stage 2   Wound Cleansed Soap and water   Wound Length (cm) 0.7 cm   Wound Width (cm) 0.4 cm   Wound Depth (cm) 0.1 cm   Wound Surface Area (cm^2) 0.28 cm^2   Wound Volume (cm^3) 0.028 cm^3   Drainage Amount None (dry)   Odor None   Gianna-wound Assessment Intact   Margins Attached edges   Wound Thickness Description not for Pressure Injury Partial thickness   Pain Assessment   Pain Assessment None - Denies Pain   Pain Level 0     BP (!) 157/70   Pulse 74   Temp 98.7 °F (37.1 °C) (Temporal)   Resp 18

## 2025-05-19 ENCOUNTER — HOSPITAL ENCOUNTER (OUTPATIENT)
Facility: HOSPITAL | Age: 65
Discharge: HOME | End: 2025-05-19
Attending: ORTHOPAEDIC SURGERY
Payer: MEDICARE

## 2025-05-19 VITALS
DIASTOLIC BLOOD PRESSURE: 75 MMHG | SYSTOLIC BLOOD PRESSURE: 147 MMHG | TEMPERATURE: 98.4 F | HEART RATE: 69 BPM | RESPIRATION RATE: 18 BRPM

## 2025-05-19 PROCEDURE — 29445 APPL RIGID TOT CNTC LEG CAST: CPT

## 2025-05-19 PROCEDURE — 97597 DBRDMT OPN WND 1ST 20 CM/<: CPT

## 2025-05-19 RX ORDER — MUPIROCIN 20 MG/G
OINTMENT TOPICAL PRN
OUTPATIENT
Start: 2025-05-19

## 2025-05-19 RX ORDER — GINSENG 100 MG
CAPSULE ORAL PRN
OUTPATIENT
Start: 2025-05-19

## 2025-05-19 RX ORDER — GENTAMICIN SULFATE 1 MG/G
OINTMENT TOPICAL PRN
OUTPATIENT
Start: 2025-05-19

## 2025-05-19 RX ORDER — LIDOCAINE HYDROCHLORIDE 40 MG/ML
SOLUTION TOPICAL PRN
OUTPATIENT
Start: 2025-05-19

## 2025-05-19 RX ORDER — TRIAMCINOLONE ACETONIDE 1 MG/G
OINTMENT TOPICAL PRN
OUTPATIENT
Start: 2025-05-19

## 2025-05-19 RX ORDER — LIDOCAINE 50 MG/G
OINTMENT TOPICAL PRN
OUTPATIENT
Start: 2025-05-19

## 2025-05-19 RX ORDER — SODIUM CHLOR/HYPOCHLOROUS ACID 0.033 %
SOLUTION, IRRIGATION IRRIGATION PRN
OUTPATIENT
Start: 2025-05-19

## 2025-05-19 RX ORDER — NEOMYCIN/BACITRACIN/POLYMYXINB 3.5-400-5K
OINTMENT (GRAM) TOPICAL PRN
OUTPATIENT
Start: 2025-05-19

## 2025-05-19 RX ORDER — CLOBETASOL PROPIONATE 0.5 MG/G
OINTMENT TOPICAL PRN
OUTPATIENT
Start: 2025-05-19

## 2025-05-19 RX ORDER — BETAMETHASONE DIPROPIONATE 0.5 MG/G
CREAM TOPICAL PRN
OUTPATIENT
Start: 2025-05-19

## 2025-05-19 RX ORDER — LIDOCAINE HYDROCHLORIDE 20 MG/ML
JELLY TOPICAL PRN
OUTPATIENT
Start: 2025-05-19

## 2025-05-19 RX ORDER — LIDOCAINE 40 MG/G
CREAM TOPICAL PRN
OUTPATIENT
Start: 2025-05-19

## 2025-05-19 RX ORDER — BACITRACIN ZINC AND POLYMYXIN B SULFATE 500; 1000 [USP'U]/G; [USP'U]/G
OINTMENT TOPICAL PRN
OUTPATIENT
Start: 2025-05-19

## 2025-05-19 NOTE — FLOWSHEET NOTE
05/19/25 1401   LLE Neurovascular Assessment   Capillary Refill Less than/Equal to 3 seconds   Color Appropriate for Ethnicity   Temperature Warm   L Pedal Pulse +2   Wound 05/14/25 Foot Medial;Left   Date First Assessed/Time First Assessed: 05/14/25 1011   Present on Original Admission: No  Wound Approximate Age at First Assessment (Weeks): 1 weeks  Primary Wound Type: Pressure Injury  Location: Foot  Wound Location Orientation: Medial;Left   Wound Image    Wound Etiology Pressure Stage 2   Wound Cleansed Soap and water   Wound Length (cm) 0.1 cm   Wound Width (cm) 0.1 cm   Wound Depth (cm) 0.1 cm   Wound Surface Area (cm^2) 0.01 cm^2   Change in Wound Size % (l*w) 96.43   Wound Volume (cm^3) 0.001 cm^3   Wound Healing % 96   Wound Assessment Epithelialization   Drainage Amount None (dry)   Odor None   Gianna-wound Assessment Intact   Wound 04/23/25 Foot Plantar;Left   Date First Assessed/Time First Assessed: 04/23/25 0956   Wound Approximate Age at First Assessment (Weeks): 3 weeks  Primary Wound Type: Diabetic Ulcer  Location: Foot  Wound Location Orientation: Plantar;Left   Wound Image    Wound Etiology Diabetic   Dressing Status Old drainage noted   Wound Cleansed Soap and water   Offloading for Diabetic Foot Ulcers Total contact cast   Wound Length (cm) 0.6 cm   Wound Width (cm) 0.5 cm   Wound Depth (cm) 0.1 cm   Wound Surface Area (cm^2) 0.3 cm^2   Change in Wound Size % (l*w) 90   Wound Volume (cm^3) 0.03 cm^3   Wound Healing % 99   Wound Assessment Pink/red   Drainage Amount Moderate (25-50%)   Drainage Description Serous   Odor None   Gianna-wound Assessment Hyperkeratosis (callous);Maceration   Margins Defined edges   Wound Thickness Description not for Pressure Injury Full thickness     BP (!) 147/75   Pulse 69   Temp 98.4 °F (36.9 °C) (Temporal)   Resp 18

## 2025-05-19 NOTE — PATIENT INSTRUCTIONS
Discharge Instructions [facility]  [address]   [address], [address]  [place] [address]   Telephone: [phone]     FAX [phone]    NAME:  Job Ruelas  YOB: 1960  MEDICAL RECORD NUMBER:  [id number]  DATE:  5/19/2025    CPT code:Debridement selective (87257) and Total contact cast (72383)     WOUND CARE ORDERS:  Left foot, Plantar :Cleanse with soap and water or normal saline & gauze. Apply Foam Windowpane (Foam to extend across plantar area of forefoot). Also pad lateral & medial foot with foam padding.  Apply Silver Collagen to wound bed. Cover with secondary dressings - Calcium Alginate & ABD pad, followed by Apply total contact cast (cast applied by MD). Dressing to changed 1 x week, in clinic.    TREATMENT ORDERS:   Please call the Wound Care Clinic if you experience any issues with the cast (Cracks in cast, pain in foot, etc...)   Elevate leg(s) above the level of the heart when sitting.   Avoid prolonged standing in one place.  Do not get dressing/cast wet.  Never walk on cast without Cast Boot.    Follow Diet as prescribed:   [] Diet as tolerated: [x] Calorie Diabetic Diet: Low carb and no Sugar [x] No Added Salt:no more then 2,000 mg daily  [] Increase Protein: [] Limit the amount of liquid you are drinking and avoid drinking in between meals (limit to 2 quarts daily)     Return Appointment:  [x] Return Appointment: With Dr. Arnulfo Gan Wednesday May 28th  [] Nurse Visit :   [] Ordered tests:    Electronically signed CHRISTIANO SHAH RN on 5/19/2025 at 2:58 PM     Wound Care Center Information: Should you experience any significant changes in your wound(s) or have questions about your wound care, please contact the [facility] at MONDAY - FRIDAY 8:00 am - 4:30.  If you need help with your wound outside these hours and cannot wait until we are again available, contact your PCP or go to the hospital emergency room.   PLEASE NOTE: IF

## 2025-05-19 NOTE — FLOWSHEET NOTE
05/19/25 1505   Wound 05/14/25 Foot Medial;Left   Date First Assessed/Time First Assessed: 05/14/25 1011   Present on Original Admission: No  Wound Approximate Age at First Assessment (Weeks): 1 weeks  Primary Wound Type: Pressure Injury  Location: Foot  Wound Location Orientation: Medial;Left   Wound Etiology Pressure Stage 2   Dressing Status New dressing applied   Dressing/Treatment Foam   Wound 04/23/25 Foot Plantar;Left   Date First Assessed/Time First Assessed: 04/23/25 0956   Wound Approximate Age at First Assessment (Weeks): 3 weeks  Primary Wound Type: Diabetic Ulcer  Location: Foot  Wound Location Orientation: Plantar;Left   Wound Etiology Diabetic   Dressing Status New dressing applied   Dressing/Treatment Collagen with Ag;Other (comment);ABD  (foam windowpane around wound)   Offloading for Diabetic Foot Ulcers Total contact cast   Pain Assessment   Pain Assessment None - Denies Pain

## 2025-05-19 NOTE — PROGRESS NOTES
Wound Center  Progress Note     Date of Service: 5/19/25      Date of Initial Visit for Current Problem:  4/23/25 - ulcer recurrence after 3 weeks.     Subjective:      Chief Complaint:  Job Ruelas is a 64 y.o.  male who presents with Lt. foot plantar forefoot wound of about 1 weeks duration.     Referred by:  Dr. Trimble     HPI:   Has lost some toes, does not want to lose more. Was being treated by a podiatrist, but did not seem to be making progress.  Wound caused by: chronic pressure/irritation due to neuropathy/diabetes.  Current wound care:  Offloading wound: no  Appetite: good  Wound associated pain: none  Diabetic: Yes - last A1c = 5.4.  Smoker: No     Past Medical History           Past Medical History:   Diagnosis Date    Diabetes (HCC)      Hypertension           Past Surgical History             Past Surgical History:   Procedure Laterality Date    AMPUTATION        CARDIAC SURGERY             Family History             Family History   Problem Relation Age of Onset    Cancer Mother      Cancer Sister      Cancer Brother           Social History              Tobacco Use    Smoking status: Never    Smokeless tobacco: Never   Substance Use Topics    Alcohol use: Not Currently       Home Medications                 Prior to Admission medications    Medication Sig Start Date End Date Taking? Authorizing Provider   empagliflozin (JARDIANCE) 25 MG tablet TAKE ONE TABLET BY MOUTH DAILY AT 9AM 12/26/24   Yes Rayna Trimble APRN - NP   amiodarone (CORDARONE) 200 MG tablet TAKE ONE TABLET BY MOUTH DAILY AT 9AM 12/19/24   Yes Rochelle Delgadillo APRN - NP   diclofenac sodium (VOLTAREN) 1 % GEL APPLY 2 G TOPICALLY 4 TIMES A DAY 11/4/24   Yes Rayna Trimble APRN - NP   HYDROcodone-acetaminophen (NORCO)  MG per tablet TAKE 1/2 PILL EVERY 6 HOURS FOR PAIN RELIEF. DO NOT EXCEED 2 WHOLE PILLS A DAY 10/2/24   Yes Provider, MD Eula   atorvastatin (LIPITOR) 40 MG tablet TAKE 1 TABLET BY

## 2025-05-23 DIAGNOSIS — I48.0 PAROXYSMAL ATRIAL FIBRILLATION (HCC): ICD-10-CM

## 2025-05-23 DIAGNOSIS — E78.2 MIXED HYPERLIPIDEMIA: ICD-10-CM

## 2025-05-23 RX ORDER — AMIODARONE HYDROCHLORIDE 200 MG/1
TABLET ORAL
Qty: 90 TABLET | Refills: 3 | Status: SHIPPED | OUTPATIENT
Start: 2025-05-23

## 2025-05-23 RX ORDER — ATORVASTATIN CALCIUM 40 MG/1
TABLET, FILM COATED ORAL
Qty: 90 TABLET | Refills: 3 | Status: SHIPPED | OUTPATIENT
Start: 2025-05-23

## 2025-05-25 ENCOUNTER — HOSPITAL ENCOUNTER (OUTPATIENT)
Facility: HOSPITAL | Age: 65
Discharge: HOME | End: 2025-05-28
Payer: MEDICARE

## 2025-05-25 DIAGNOSIS — G47.33 OBSTRUCTIVE SLEEP APNEA (ADULT) (PEDIATRIC): ICD-10-CM

## 2025-05-25 PROCEDURE — 95811 POLYSOM 6/>YRS CPAP 4/> PARM: CPT | Performed by: INTERNAL MEDICINE

## 2025-05-26 VITALS
WEIGHT: 254 LBS | SYSTOLIC BLOOD PRESSURE: 138 MMHG | BODY MASS INDEX: 34.4 KG/M2 | DIASTOLIC BLOOD PRESSURE: 72 MMHG | HEIGHT: 72 IN | HEART RATE: 79 BPM | OXYGEN SATURATION: 94 % | TEMPERATURE: 98.3 F

## 2025-05-28 ENCOUNTER — HOSPITAL ENCOUNTER (OUTPATIENT)
Facility: HOSPITAL | Age: 65
Discharge: HOME | End: 2025-05-28
Attending: ORTHOPAEDIC SURGERY
Payer: MEDICARE

## 2025-05-28 VITALS
HEART RATE: 79 BPM | SYSTOLIC BLOOD PRESSURE: 135 MMHG | TEMPERATURE: 98 F | DIASTOLIC BLOOD PRESSURE: 84 MMHG | RESPIRATION RATE: 18 BRPM

## 2025-05-28 DIAGNOSIS — L97.422 DIABETIC ULCER OF LEFT MIDFOOT ASSOCIATED WITH DIABETES MELLITUS DUE TO UNDERLYING CONDITION, WITH FAT LAYER EXPOSED (HCC): Primary | ICD-10-CM

## 2025-05-28 DIAGNOSIS — E08.621 DIABETIC ULCER OF LEFT MIDFOOT ASSOCIATED WITH DIABETES MELLITUS DUE TO UNDERLYING CONDITION, WITH FAT LAYER EXPOSED (HCC): Primary | ICD-10-CM

## 2025-05-28 DIAGNOSIS — L97.522 DIABETIC ULCER OF TOE OF LEFT FOOT ASSOCIATED WITH DIABETES MELLITUS DUE TO UNDERLYING CONDITION, WITH FAT LAYER EXPOSED (HCC): ICD-10-CM

## 2025-05-28 DIAGNOSIS — E08.621 DIABETIC ULCER OF TOE OF LEFT FOOT ASSOCIATED WITH DIABETES MELLITUS DUE TO UNDERLYING CONDITION, WITH FAT LAYER EXPOSED (HCC): ICD-10-CM

## 2025-05-28 PROCEDURE — 11055 PARING/CUTG B9 HYPRKER LES 1: CPT

## 2025-05-28 RX ORDER — BETAMETHASONE DIPROPIONATE 0.5 MG/G
CREAM TOPICAL PRN
OUTPATIENT
Start: 2025-05-28

## 2025-05-28 RX ORDER — SODIUM CHLOR/HYPOCHLOROUS ACID 0.033 %
SOLUTION, IRRIGATION IRRIGATION PRN
OUTPATIENT
Start: 2025-05-28

## 2025-05-28 RX ORDER — GINSENG 100 MG
CAPSULE ORAL PRN
OUTPATIENT
Start: 2025-05-28

## 2025-05-28 RX ORDER — TRIAMCINOLONE ACETONIDE 1 MG/G
OINTMENT TOPICAL PRN
OUTPATIENT
Start: 2025-05-28

## 2025-05-28 RX ORDER — LIDOCAINE HYDROCHLORIDE 20 MG/ML
JELLY TOPICAL PRN
OUTPATIENT
Start: 2025-05-28

## 2025-05-28 RX ORDER — BACITRACIN ZINC AND POLYMYXIN B SULFATE 500; 1000 [USP'U]/G; [USP'U]/G
OINTMENT TOPICAL PRN
OUTPATIENT
Start: 2025-05-28

## 2025-05-28 RX ORDER — CLOBETASOL PROPIONATE 0.5 MG/G
OINTMENT TOPICAL PRN
OUTPATIENT
Start: 2025-05-28

## 2025-05-28 RX ORDER — LIDOCAINE 50 MG/G
OINTMENT TOPICAL PRN
OUTPATIENT
Start: 2025-05-28

## 2025-05-28 RX ORDER — LIDOCAINE HYDROCHLORIDE 40 MG/ML
SOLUTION TOPICAL PRN
OUTPATIENT
Start: 2025-05-28

## 2025-05-28 RX ORDER — NEOMYCIN/BACITRACIN/POLYMYXINB 3.5-400-5K
OINTMENT (GRAM) TOPICAL PRN
OUTPATIENT
Start: 2025-05-28

## 2025-05-28 RX ORDER — GENTAMICIN SULFATE 1 MG/G
OINTMENT TOPICAL PRN
OUTPATIENT
Start: 2025-05-28

## 2025-05-28 RX ORDER — LIDOCAINE 40 MG/G
CREAM TOPICAL PRN
OUTPATIENT
Start: 2025-05-28

## 2025-05-28 RX ORDER — MUPIROCIN 20 MG/G
OINTMENT TOPICAL PRN
OUTPATIENT
Start: 2025-05-28

## 2025-05-28 ASSESSMENT — PAIN SCALES - GENERAL: PAINLEVEL_OUTOF10: 0

## 2025-05-28 NOTE — PATIENT INSTRUCTIONS
Discharge Instructions [facility]  [address]   [address]  [place] [address]   Telephone: [phone]     FAX [phone]    NAME:  Job Ruelas  YOB: 1960  MEDICAL RECORD NUMBER:  [id number]  DATE:  5/28/2025      WOUND CARE ORDERS:  Left foot, Plantar :Cleanse with soap and water, Thoroughly pat dry. Apply Xeroform & Fabric Bandaid or gauze & Roll Gauze. Dressing to be changed every other day & as needed for compromise of dressing.    TREATMENT ORDERS:   Wear offloading boot/gary   Elevate leg(s) above the level of the heart when sitting.   Avoid prolonged standing in one place.  Do not get dressing/cast wet.    Follow Diet as prescribed:   [] Diet as tolerated: [x] Calorie Diabetic Diet: Low carb and no Sugar [x] No Added Salt:no more then 2,000 mg daily  [] Increase Protein: [] Limit the amount of liquid you are drinking and avoid drinking in between meals (limit to 2 quarts daily)     Return Appointment:  [x] Return Appointment: With Dr. Arnulfo Gan in 1 week  [] Nurse Visit :   [] Ordered tests:    Electronically signed CHRISTIANO SHAH RN on 5/28/2025 at 11:07 AM     Wound Care Center Information: Should you experience any significant changes in your wound(s) or have questions about your wound care, please contact the [facility] at MONDAY - FRIDAY 8:00 am - 4:30.  If you need help with your wound outside these hours and cannot wait until we are again available, contact your PCP or go to the hospital emergency room.   PLEASE NOTE: IF YOU ARE UNABLE TO OBTAIN WOUND SUPPLIES, CONTINUE TO USE THE SUPPLIES YOU HAVE AVAILABLE UNTIL YOU ARE ABLE TO REACH US. IT IS MOST IMPORTANT TO KEEP THE WOUND COVERED AT ALL TIMES.     Physician Signature:_______________________    Date: ___________ Time:  ____________

## 2025-05-28 NOTE — FLOWSHEET NOTE
05/28/25 1020   LLE Neurovascular Assessment   Capillary Refill Less than/Equal to 3 seconds   Color Appropriate for Ethnicity   Temperature Warm   L Pedal Pulse +2   Wound 04/23/25 Foot Plantar;Left   Date First Assessed/Time First Assessed: 04/23/25 0956   Wound Approximate Age at First Assessment (Weeks): 3 weeks  Primary Wound Type: Diabetic Ulcer  Location: Foot  Wound Location Orientation: Plantar;Left   Wound Image    Wound Etiology Diabetic   Dressing Status Old drainage noted   Wound Cleansed Soap and water   Wound Length (cm) 0.5 cm   Wound Width (cm) 0.3 cm   Wound Depth (cm) 0.1 cm   Wound Surface Area (cm^2) 0.15 cm^2   Change in Wound Size % (l*w) 95   Wound Volume (cm^3) 0.015 cm^3   Wound Healing % 100   Wound Assessment Pink/red   Drainage Amount Moderate (25-50%)   Drainage Description Serous   Odor None   Gianna-wound Assessment Maceration   Margins Defined edges   Wound Thickness Description not for Pressure Injury Full thickness   Pain Assessment   Pain Assessment None - Denies Pain   Pain Level 0         /84   Pulse 79   Temp 98 °F (36.7 °C) (Temporal)   Resp 18

## 2025-05-28 NOTE — FLOWSHEET NOTE
05/28/25 1108   Wound 04/23/25 Foot Plantar;Left   Date First Assessed/Time First Assessed: 04/23/25 0956   Wound Approximate Age at First Assessment (Weeks): 3 weeks  Primary Wound Type: Diabetic Ulcer  Location: Foot  Wound Location Orientation: Plantar;Left   Dressing Status New dressing applied   Dressing/Treatment Xeroform  (band-aid)

## 2025-05-28 NOTE — PROGRESS NOTES
Wound Center  Progress Note     Date of Service: 5/28/25      Date of Initial Visit for Current Problem:  4/23/25 - ulcer recurrence after 3 weeks.     Subjective:      Chief Complaint:  Job Ruelas is a 64 y.o.  male who presents with Lt. foot plantar forefoot wound of several weeks duration.     Referred by:  Dr. Trimble     HPI:   Has lost some toes, does not want to lose more. Was being treated by a podiatrist, but did not seem to be making progress.  Wound caused by: chronic pressure/irritation due to neuropathy/diabetes.  Current wound care:  Offloading wound: no  Appetite: good  Wound associated pain: none  Diabetic: Yes - last A1c = 5.4.  Smoker: No     Past Medical History           Past Medical History:   Diagnosis Date    Diabetes (HCC)      Hypertension           Past Surgical History             Past Surgical History:   Procedure Laterality Date    AMPUTATION        CARDIAC SURGERY             Family History             Family History   Problem Relation Age of Onset    Cancer Mother      Cancer Sister      Cancer Brother           Social History              Tobacco Use    Smoking status: Never    Smokeless tobacco: Never   Substance Use Topics    Alcohol use: Not Currently       Home Medications                 Prior to Admission medications    Medication Sig Start Date End Date Taking? Authorizing Provider   empagliflozin (JARDIANCE) 25 MG tablet TAKE ONE TABLET BY MOUTH DAILY AT 9AM 12/26/24   Yes Rayna Trimble APRN - NP   amiodarone (CORDARONE) 200 MG tablet TAKE ONE TABLET BY MOUTH DAILY AT 9AM 12/19/24   Yes Rochelle Delgadillo APRN - NP   diclofenac sodium (VOLTAREN) 1 % GEL APPLY 2 G TOPICALLY 4 TIMES A DAY 11/4/24   Yes Rayna Trimble APRN - NP   HYDROcodone-acetaminophen (NORCO)  MG per tablet TAKE 1/2 PILL EVERY 6 HOURS FOR PAIN RELIEF. DO NOT EXCEED 2 WHOLE PILLS A DAY 10/2/24   Yes Provider, MD Eula   atorvastatin (LIPITOR) 40 MG tablet TAKE 1 TABLET BY

## 2025-05-30 ENCOUNTER — CLINICAL DOCUMENTATION (OUTPATIENT)
Age: 65
End: 2025-05-30

## 2025-05-30 DIAGNOSIS — G47.39 MIXED SLEEP APNEA: Primary | ICD-10-CM

## 2025-05-30 PROBLEM — Z12.11 SCREENING FOR COLON CANCER: Status: RESOLVED | Noted: 2025-04-30 | Resolved: 2025-05-30

## 2025-05-30 NOTE — PROGRESS NOTES
Results of sleep study in [facility]  Lead tech to convey results to patient    During first part of study, he fell asleep in about 5 minutes. All stages of sleep observed. Severe snoring. Sleep study started as a diagnostic polysomnogram during which an AHI of 31/hour was found. Lowest oxygen saturation was 69%.This is consistent with severe sleep apnea    PAP therapy applied to control apnea/respiratory events. CPAP failed to control respiratory events, so therapy changed to bi-level. At a setting of BIPAP 16/12 cmH20, respiratory events significantly improved and oxygen saturation maintained at or above 90%. He appeared somewhat sensitive to airflow adjustments.. AHI at optimal pressure was 12/hor.        I will order a PAP device for his home use. It will start a little lower than best pressure attained in sleep center (for comfort) and will adjust up during sleep.     Patient should call the office the day he gets set up with new PAP device so we can schedule him for an adherence/compliance visit within 31-90 days of obtaining a new device.      he will need a first adherence visit.     Order attached  Staff to kindly send to dme and inform patient of necessary details pertaining to PAP order and follow-up.

## 2025-06-04 ENCOUNTER — TELEPHONE (OUTPATIENT)
Age: 65
End: 2025-06-04

## 2025-06-04 ENCOUNTER — HOSPITAL ENCOUNTER (OUTPATIENT)
Facility: HOSPITAL | Age: 65
Discharge: HOME | End: 2025-06-04
Attending: ORTHOPAEDIC SURGERY
Payer: MEDICARE

## 2025-06-04 VITALS
HEART RATE: 68 BPM | RESPIRATION RATE: 18 BRPM | DIASTOLIC BLOOD PRESSURE: 60 MMHG | SYSTOLIC BLOOD PRESSURE: 146 MMHG | TEMPERATURE: 98.8 F

## 2025-06-04 DIAGNOSIS — L97.422 DIABETIC ULCER OF LEFT MIDFOOT ASSOCIATED WITH DIABETES MELLITUS DUE TO UNDERLYING CONDITION, WITH FAT LAYER EXPOSED (HCC): Primary | ICD-10-CM

## 2025-06-04 DIAGNOSIS — L97.522 DIABETIC ULCER OF TOE OF LEFT FOOT ASSOCIATED WITH DIABETES MELLITUS DUE TO UNDERLYING CONDITION, WITH FAT LAYER EXPOSED (HCC): ICD-10-CM

## 2025-06-04 DIAGNOSIS — E08.621 DIABETIC ULCER OF TOE OF LEFT FOOT ASSOCIATED WITH DIABETES MELLITUS DUE TO UNDERLYING CONDITION, WITH FAT LAYER EXPOSED (HCC): ICD-10-CM

## 2025-06-04 DIAGNOSIS — E08.621 DIABETIC ULCER OF LEFT MIDFOOT ASSOCIATED WITH DIABETES MELLITUS DUE TO UNDERLYING CONDITION, WITH FAT LAYER EXPOSED (HCC): Primary | ICD-10-CM

## 2025-06-04 PROCEDURE — 11042 DBRDMT SUBQ TIS 1ST 20SQCM/<: CPT

## 2025-06-04 PROCEDURE — 29445 APPL RIGID TOT CNTC LEG CAST: CPT

## 2025-06-04 RX ORDER — CLOBETASOL PROPIONATE 0.5 MG/G
OINTMENT TOPICAL PRN
OUTPATIENT
Start: 2025-06-04

## 2025-06-04 RX ORDER — TRIAMCINOLONE ACETONIDE 1 MG/G
OINTMENT TOPICAL PRN
OUTPATIENT
Start: 2025-06-04

## 2025-06-04 RX ORDER — LIDOCAINE HYDROCHLORIDE 20 MG/ML
JELLY TOPICAL PRN
OUTPATIENT
Start: 2025-06-04

## 2025-06-04 RX ORDER — NEOMYCIN/BACITRACIN/POLYMYXINB 3.5-400-5K
OINTMENT (GRAM) TOPICAL PRN
OUTPATIENT
Start: 2025-06-04

## 2025-06-04 RX ORDER — BACITRACIN ZINC AND POLYMYXIN B SULFATE 500; 1000 [USP'U]/G; [USP'U]/G
OINTMENT TOPICAL PRN
OUTPATIENT
Start: 2025-06-04

## 2025-06-04 RX ORDER — GINSENG 100 MG
CAPSULE ORAL PRN
OUTPATIENT
Start: 2025-06-04

## 2025-06-04 RX ORDER — MUPIROCIN 20 MG/G
OINTMENT TOPICAL PRN
OUTPATIENT
Start: 2025-06-04

## 2025-06-04 RX ORDER — BETAMETHASONE DIPROPIONATE 0.5 MG/G
CREAM TOPICAL PRN
OUTPATIENT
Start: 2025-06-04

## 2025-06-04 RX ORDER — SODIUM CHLOR/HYPOCHLOROUS ACID 0.033 %
SOLUTION, IRRIGATION IRRIGATION PRN
OUTPATIENT
Start: 2025-06-04

## 2025-06-04 RX ORDER — LIDOCAINE 50 MG/G
OINTMENT TOPICAL PRN
OUTPATIENT
Start: 2025-06-04

## 2025-06-04 RX ORDER — LIDOCAINE HYDROCHLORIDE 40 MG/ML
SOLUTION TOPICAL PRN
OUTPATIENT
Start: 2025-06-04

## 2025-06-04 RX ORDER — LIDOCAINE 40 MG/G
CREAM TOPICAL PRN
OUTPATIENT
Start: 2025-06-04

## 2025-06-04 RX ORDER — GENTAMICIN SULFATE 1 MG/G
OINTMENT TOPICAL PRN
OUTPATIENT
Start: 2025-06-04

## 2025-06-04 NOTE — FLOWSHEET NOTE
06/04/25 1017   Wound 04/23/25 Foot Plantar;Left   Date First Assessed/Time First Assessed: 04/23/25 0956   Wound Approximate Age at First Assessment (Weeks): 3 weeks  Primary Wound Type: Diabetic Ulcer  Location: Foot  Wound Location Orientation: Plantar;Left   Wound Image    Wound Etiology Diabetic   Dressing Status Old drainage noted   Wound Cleansed Soap and water   Wound Length (cm) 3 cm   Wound Width (cm) 2.5 cm   Wound Depth (cm) 0.5 cm   Wound Surface Area (cm^2) 7.5 cm^2   Change in Wound Size % (l*w) -150   Wound Volume (cm^3) 3.75 cm^3   Wound Healing % -25   Wound Assessment Pink/red   Drainage Amount Moderate (25-50%)   Drainage Description Serous   Odor None   Gianna-wound Assessment Maceration   Margins Defined edges   Wound Thickness Description not for Pressure Injury Full thickness         BP (!) 146/60   Pulse 68   Temp 98.8 °F (37.1 °C) (Temporal)   Resp 18

## 2025-06-04 NOTE — PATIENT INSTRUCTIONS
Discharge Instructions [facility]  [address]   [address]  [place] [address]   Telephone: [phone]     FAX [phone]    NAME:  Job Ruelas  YOB: 1960  MEDICAL RECORD NUMBER:  [id number]  DATE:  6/4/2025    CPT code:Debridement selective (88489) and Total contact cast (96796)     WOUND CARE ORDERS:  Left foot, Plantar :Cleanse with soap and water or normal saline & gauze. Apply Foam Windowpane (Foam to extend across plantar area of forefoot). Apply Silver Collagen to wound bed.   Cover with secondary dressings - Calcium Alginate & ABD pad, followed by Total contact cast (cast applied by MD). Dressing to changed 1 x week, in clinic.    TREATMENT ORDERS:    Elevate leg(s) above the level of the heart when sitting.   Avoid prolonged standing in one place.  Do not get dressing/cast wet.  Never walk on cast without Cast Boot.    Follow Diet as prescribed:   [] Diet as tolerated: [x] Calorie Diabetic Diet: Low carb and no Sugar [] No Added Salt:no more then 2,000 mg daily  [] Increase Protein: [] Limit the amount of liquid you are drinking and avoid drinking in between meals (limit to 2 quarts daily)     Return Appointment:  [x] Return Appointment: With Dr. Arnulfo Gan in 1 week  [] Nurse Visit :   [] Ordered tests:    Electronically signed CHRISTIANO SHAH RN on 6/4/2025 at 10:53 AM     Wound Care Center Information: Should you experience any significant changes in your wound(s) or have questions about your wound care, please contact the [facility] at MONDAY - FRIDAY 8:00 am - 4:30.  If you need help with your wound outside these hours and cannot wait until we are again available, contact your PCP or go to the hospital emergency room.   PLEASE NOTE: IF YOU ARE UNABLE TO OBTAIN WOUND SUPPLIES, CONTINUE TO USE THE SUPPLIES YOU HAVE AVAILABLE UNTIL YOU ARE ABLE TO REACH US. IT IS MOST IMPORTANT TO KEEP THE WOUND COVERED AT ALL TIMES.

## 2025-06-04 NOTE — FLOWSHEET NOTE
06/04/25 1053   Wound 04/23/25 Foot Plantar;Left   Date First Assessed/Time First Assessed: 04/23/25 0956   Wound Approximate Age at First Assessment (Weeks): 3 weeks  Primary Wound Type: Diabetic Ulcer  Location: Foot  Wound Location Orientation: Plantar;Left   Dressing Status New dressing applied   Dressing/Treatment   (windowpane foam border, collagen ag, alginate, ABD, Total contact cast applied by Dr Gan)   Post-Procedure Length (cm) 3 cm   Post-Procedure Width (cm) 2.5 cm   Post-Procedure Depth (cm) 0.6 cm   Post-Procedure Surface Area (cm^2) 7.5 cm^2   Post-Procedure Volume (cm^3) 4.5 cm^3     Total Contact Cast    NAME:  Job Ruelas  YOB: 1960  MEDICAL RECORD NUMBER:  [id number]  DATE:  6/4/2025    Goal:  Patient will maintain integrity of cast, avoid mobility hazards, and report complications that may occur (foul odor, pain, numbness, cracked cast).     Removed old contact cast if indicated and wash extremity with soap and water.  Applied ordered dressing.  Applied per   Applied in clinic to left lower leg.  Allow cast to dry.   Instructed patient to report to health care provider, including wound care center, any back pain, hip pain, or leg pain, numbness of toes, or any odor coming from the cast.   Instructed patient not to stick any foreign objects down into cast.  Instructed patient to utilize assistive devices(crutches, cane or walker) as ordered.  Instructed patient to continue offloading as directed.       Applied cast per  Guidelines    Electronically signed by Nella Hill RN on 6/4/2025 at 11:45 AM

## 2025-06-06 NOTE — PROGRESS NOTES
VSS, Afebrile   General: well developed, well nourished, pleasant , NAD. Hygiene good  Psych: cooperative. Pleasant. No anxiety or depression. Normal mood and affect.  Neuro: alert and oriented to person/place/situation. Otherwise nonfocal.  HEENT: Normocephalic, atraumatic. EOMI. Conjunctiva clear. No scleral icterus.   Chest: Respirations nonlabored.  Abdomen:  Nondistended.     Lower extremities: color normal; temperature normal. Hair growth is not present. Calves are supple, nontender, approximately equally sized in comparison.      Focused Lower Extremity Exam:        Ulcer Location: Lt. foot Plantar forefoot   Etiology: neuropathic  Wound:  0.6 x 0.5 x 0.1 cm  - smaller!!  Ulcer bed: Granular/Healthy    Periwound: Macerated.  Exudate: None  Depth of Wound: To Fat Layer, may be to fascial layer.     Assessment:      64 y.o. male with Lt. foot plantar neuropathic/diabetic ulcer.  Not responsive to care by a podiatrist - TCC never tried.  X-rays a few months ago reportedly normal.     Plan:   Wound was at least mechanically debrided using a 4x4. More extensive debridement, if done, is outlined below.     Ulcer Debridement     Ulcer Number 1; Left Foot To Fat Layer;  Pressure      Character of Ulcer: Improved     Indication for Debridement: Abnormal wound edge and Abnormal Wound base      Pre debridement measurements: See above     Risks of procedure were discussed with patient. Consent has been signed.      Anesthetic: Lidocaine 4% topical cream      Level of Debridement: Subctaneous Tissue      Tissue and/or Material removed: Callus and subQ     Pre-debridement severity: Fat Layer Exposed      Post debridement severity: Fat Layer exposed     Instrument(s) used: Curette     Bleeding controlled with: Pressure      Estimated blood loss: Minimal     Post debridement measurements: 0.1 cm deeper. 0.2 cm wider.   Post procedural pain: none     Patient tolerated procedure well.       We discussed that he may need foot

## 2025-06-09 ENCOUNTER — HOSPITAL ENCOUNTER (EMERGENCY)
Facility: HOSPITAL | Age: 65
Discharge: HOME | End: 2025-06-10
Attending: EMERGENCY MEDICINE
Payer: MEDICARE

## 2025-06-09 VITALS
DIASTOLIC BLOOD PRESSURE: 96 MMHG | TEMPERATURE: 97.8 F | HEIGHT: 72 IN | HEART RATE: 72 BPM | WEIGHT: 254 LBS | OXYGEN SATURATION: 96 % | RESPIRATION RATE: 22 BRPM | BODY MASS INDEX: 34.4 KG/M2 | SYSTOLIC BLOOD PRESSURE: 160 MMHG

## 2025-06-09 DIAGNOSIS — L97.529 CHRONIC FOOT ULCER, LEFT, WITH UNSPECIFIED SEVERITY (HCC): Primary | ICD-10-CM

## 2025-06-09 DIAGNOSIS — Z46.89 ENCOUNTER FOR CAST REMOVAL: ICD-10-CM

## 2025-06-09 PROCEDURE — 99282 EMERGENCY DEPT VISIT SF MDM: CPT

## 2025-06-09 ASSESSMENT — PAIN SCALES - GENERAL: PAINLEVEL_OUTOF10: 0

## 2025-06-09 ASSESSMENT — PAIN - FUNCTIONAL ASSESSMENT: PAIN_FUNCTIONAL_ASSESSMENT: 0-10

## 2025-06-10 NOTE — ED TRIAGE NOTES
Patient arrived via POV with reports of getting water inside of his cast on his left foot. Pt reports having a wound on the bottom of his left foot that has been being casted in order to assist with healing. Patient states that he contacted his doctor who told him to come to the emergency room and have the cast removed in order to avoid allowing the skin on the left foot to become too moist and break down and to have the wound redressed. Patient ambulates without difficulty and currently has a hard cast on covered by a medical boot.

## 2025-06-11 ENCOUNTER — HOSPITAL ENCOUNTER (OUTPATIENT)
Facility: HOSPITAL | Age: 65
Discharge: HOME | End: 2025-06-11
Attending: ORTHOPAEDIC SURGERY
Payer: MEDICARE

## 2025-06-11 VITALS
RESPIRATION RATE: 18 BRPM | HEART RATE: 70 BPM | DIASTOLIC BLOOD PRESSURE: 64 MMHG | TEMPERATURE: 98 F | SYSTOLIC BLOOD PRESSURE: 134 MMHG

## 2025-06-11 DIAGNOSIS — E08.621 DIABETIC ULCER OF TOE OF LEFT FOOT ASSOCIATED WITH DIABETES MELLITUS DUE TO UNDERLYING CONDITION, WITH FAT LAYER EXPOSED (HCC): Primary | ICD-10-CM

## 2025-06-11 DIAGNOSIS — L97.522 DIABETIC ULCER OF TOE OF LEFT FOOT ASSOCIATED WITH DIABETES MELLITUS DUE TO UNDERLYING CONDITION, WITH FAT LAYER EXPOSED (HCC): Primary | ICD-10-CM

## 2025-06-11 PROCEDURE — 29445 APPL RIGID TOT CNTC LEG CAST: CPT

## 2025-06-11 RX ORDER — LIDOCAINE 40 MG/G
CREAM TOPICAL PRN
OUTPATIENT
Start: 2025-06-11

## 2025-06-11 RX ORDER — TRIAMCINOLONE ACETONIDE 1 MG/G
OINTMENT TOPICAL PRN
OUTPATIENT
Start: 2025-06-11

## 2025-06-11 RX ORDER — SODIUM CHLOR/HYPOCHLOROUS ACID 0.033 %
SOLUTION, IRRIGATION IRRIGATION PRN
OUTPATIENT
Start: 2025-06-11

## 2025-06-11 RX ORDER — GENTAMICIN SULFATE 1 MG/G
OINTMENT TOPICAL PRN
OUTPATIENT
Start: 2025-06-11

## 2025-06-11 RX ORDER — NEOMYCIN/BACITRACIN/POLYMYXINB 3.5-400-5K
OINTMENT (GRAM) TOPICAL PRN
OUTPATIENT
Start: 2025-06-11

## 2025-06-11 RX ORDER — LIDOCAINE HYDROCHLORIDE 20 MG/ML
JELLY TOPICAL PRN
OUTPATIENT
Start: 2025-06-11

## 2025-06-11 RX ORDER — LIDOCAINE HYDROCHLORIDE 40 MG/ML
SOLUTION TOPICAL PRN
OUTPATIENT
Start: 2025-06-11

## 2025-06-11 RX ORDER — LIDOCAINE 50 MG/G
OINTMENT TOPICAL PRN
OUTPATIENT
Start: 2025-06-11

## 2025-06-11 RX ORDER — CLOBETASOL PROPIONATE 0.5 MG/G
OINTMENT TOPICAL PRN
OUTPATIENT
Start: 2025-06-11

## 2025-06-11 RX ORDER — BETAMETHASONE DIPROPIONATE 0.5 MG/G
CREAM TOPICAL PRN
OUTPATIENT
Start: 2025-06-11

## 2025-06-11 RX ORDER — GINSENG 100 MG
CAPSULE ORAL PRN
OUTPATIENT
Start: 2025-06-11

## 2025-06-11 RX ORDER — BACITRACIN ZINC AND POLYMYXIN B SULFATE 500; 1000 [USP'U]/G; [USP'U]/G
OINTMENT TOPICAL PRN
OUTPATIENT
Start: 2025-06-11

## 2025-06-11 RX ORDER — MUPIROCIN 2 %
OINTMENT (GRAM) TOPICAL PRN
OUTPATIENT
Start: 2025-06-11

## 2025-06-11 NOTE — FLOWSHEET NOTE
06/11/25 1053   Wound 04/23/25 Foot Plantar;Left   Date First Assessed/Time First Assessed: 04/23/25 0956   Wound Approximate Age at First Assessment (Weeks): 3 weeks  Primary Wound Type: Diabetic Ulcer  Location: Foot  Wound Location Orientation: Plantar;Left   Dressing Status New dressing applied   Dressing/Treatment   (xeroform,foam windowpane, ABD, TCC applied by Dr Gan)     Total Contact Cast    NAME:  Job Ruelas  YOB: 1960  MEDICAL RECORD NUMBER:  [id number]  DATE:  6/11/2025    Goal:  Patient will maintain integrity of cast, avoid mobility hazards, and report complications that may occur (foul odor, pain, numbness, cracked cast).     Removed old contact cast if indicated and wash extremity with soap and water.  Applied ordered dressing.  Applied per Dr. Gan  Applied in clinic to Left lower leg.  Allow cast to dry.   Instructed patient to report to health care provider, including wound care center, any back pain, hip pain, or leg pain, numbness of toes, or any odor coming from the cast.   Instructed patient not to stick any foreign objects down into cast.  Instructed patient to utilize assistive devices(crutches, cane or walker) as ordered.  Instructed patient to continue offloading as directed.       Applied cast per  Guidelines    Electronically signed by Nella Hill RN on 6/11/2025 at 10:54 AM

## 2025-06-11 NOTE — PROGRESS NOTES
Wound Center  Progress Note     Date of Service: 6/\11/25      Date of Initial Visit for Current Problem:  4/23/25 - ulcer recurrence after 3 weeks.     Subjective:      Chief Complaint:  Job Ruelas is a 64 y.o.  male who presents with Lt. foot plantar forefoot wound of several weeks duration.     Referred by:  Dr. Trimble     HPI:   Has lost some toes, does not want to lose more. Was being treated by a podiatrist, but did not seem to be making progress.  Wound caused by: chronic pressure/irritation due to neuropathy/diabetes.  Current wound care:  Offloading wound: no  Appetite: good  Wound associated pain: none  Diabetic: Yes - last A1c = 5.4.  Smoker: No     Past Medical History           Past Medical History:   Diagnosis Date    Diabetes (HCC)      Hypertension           Past Surgical History             Past Surgical History:   Procedure Laterality Date    AMPUTATION        CARDIAC SURGERY             Family History             Family History   Problem Relation Age of Onset    Cancer Mother      Cancer Sister      Cancer Brother           Social History              Tobacco Use    Smoking status: Never    Smokeless tobacco: Never   Substance Use Topics    Alcohol use: Not Currently       Home Medications                 Prior to Admission medications    Medication Sig Start Date End Date Taking? Authorizing Provider   empagliflozin (JARDIANCE) 25 MG tablet TAKE ONE TABLET BY MOUTH DAILY AT 9AM 12/26/24   Yes Rayna Trimble APRN - NP   amiodarone (CORDARONE) 200 MG tablet TAKE ONE TABLET BY MOUTH DAILY AT 9AM 12/19/24   Yes Rochelle Delgadillo APRN - NP   diclofenac sodium (VOLTAREN) 1 % GEL APPLY 2 G TOPICALLY 4 TIMES A DAY 11/4/24   Yes Rayna Trimble APRN - NP   HYDROcodone-acetaminophen (NORCO)  MG per tablet TAKE 1/2 PILL EVERY 6 HOURS FOR PAIN RELIEF. DO NOT EXCEED 2 WHOLE PILLS A DAY 10/2/24   Yes Provider, MD Eula   atorvastatin (LIPITOR) 40 MG tablet TAKE 1 TABLET BY

## 2025-06-11 NOTE — FLOWSHEET NOTE
06/11/25 0900   Wound 04/23/25 Foot Plantar;Left   Date First Assessed/Time First Assessed: 04/23/25 0956   Wound Approximate Age at First Assessment (Weeks): 3 weeks  Primary Wound Type: Diabetic Ulcer  Location: Foot  Wound Location Orientation: Plantar;Left   Wound Image    Wound Etiology Diabetic   Dressing Status Old drainage noted   Wound Cleansed Cleansed with saline   Wound Length (cm) 0.3 cm   Wound Width (cm) 0.2 cm   Wound Depth (cm) 0.3 cm   Wound Surface Area (cm^2) 0.06 cm^2   Change in Wound Size % (l*w) 98   Wound Volume (cm^3) 0.018 cm^3   Wound Healing % 99   Wound Assessment Pink/red   Drainage Amount Small (< 25%)   Drainage Description Serous   Odor None   Gianna-wound Assessment Hyperkeratosis (callous)   Margins Defined edges   Wound Thickness Description not for Pressure Injury Full thickness   Pain Assessment   Pain Assessment None - Denies Pain     /64   Pulse 70   Temp 98 °F (36.7 °C) (Temporal)   Resp 18

## 2025-06-15 ENCOUNTER — HOSPITAL ENCOUNTER (EMERGENCY)
Facility: HOSPITAL | Age: 65
Discharge: HOME | End: 2025-06-16
Attending: EMERGENCY MEDICINE
Payer: MEDICARE

## 2025-06-15 VITALS
HEIGHT: 72 IN | RESPIRATION RATE: 18 BRPM | WEIGHT: 254 LBS | SYSTOLIC BLOOD PRESSURE: 137 MMHG | OXYGEN SATURATION: 95 % | DIASTOLIC BLOOD PRESSURE: 52 MMHG | BODY MASS INDEX: 34.4 KG/M2 | HEART RATE: 82 BPM

## 2025-06-15 DIAGNOSIS — Z46.89 CAST REMOVAL: ICD-10-CM

## 2025-06-15 DIAGNOSIS — L97.529 ULCER OF FOOT, CHRONIC, LEFT, WITH UNSPECIFIED SEVERITY (HCC): Primary | ICD-10-CM

## 2025-06-15 PROCEDURE — 99282 EMERGENCY DEPT VISIT SF MDM: CPT

## 2025-06-16 NOTE — DISCHARGE INSTR - COC
Continuity of Care Form    Patient Name: Job Ruelas   :  1960  MRN:  [id number]    Admit date:  6/15/2025  Discharge date:  ***    Code Status Order: No Order   Advance Directives:     Admitting Physician:  No admitting provider for patient encounter.  PCP: Rayna Trimble APRN - NP    Discharging Nurse: ***  Discharging Hospital Unit/Room#: ED10/10  Discharging Unit Phone Number: ***    Emergency Contact:   Extended Emergency Contact Information  Primary Emergency Contact: Wellington Zamarripa, [facility]  Home Phone: [phone]  Mobile Phone: [phone]  Relation: Child  Secondary Emergency Contact: Imelda Pichardo, [facility]  Home Phone: [phone]  Mobile Phone: [phone]  Relation: Other    Past Surgical History:  Past Surgical History:   Procedure Laterality Date    AMPUTATION      CARDIAC SURGERY         Immunization History:   Immunization History   Administered Date(s) Administered    Influenza, FLUAD, (age 65 y+), IM, Quadv, 0.5mL 10/20/2023    Influenza, FLUAD, (age 65 y+), IM, Trivalent PF, 0.5mL 10/18/2024    Pneumococcal, PCV20, PREVNAR 20, (age 6w+), IM, 0.5mL 2024       Active Problems:  Patient Active Problem List   Diagnosis Code    Diabetic ulcer of left foot associated with diabetes mellitus due to underlying condition, with fat layer exposed ([facility]) E08.621, L97.522       Isolation/Infection:   Isolation            No Isolation          Patient Infection Status    No active infections.   Resolved       Infection Onset Added Last Indicated Last Indicated By Resolved Resolved By    COVID-19 23 COVID-19 & Influenza Combo 23 Infection                          Nurse Assessment:  Last Vital Signs: BP (!) 137/52   Pulse 82   Resp 18   Ht 1.829 m (6')   Wt 115.2 kg (254 lb)   SpO2 95%   BMI 34.45 kg/m²     Last documented pain score (0-10 scale):    Last Weight:   Wt Readings

## 2025-06-16 NOTE — ED PROVIDER NOTES
Please disregards these errors. Please excuse any errors that have escaped final proofreading.)         Nella Hooks MD  06/16/25 0132

## 2025-06-18 ENCOUNTER — HOSPITAL ENCOUNTER (OUTPATIENT)
Facility: HOSPITAL | Age: 65
Discharge: HOME | End: 2025-06-18
Attending: ORTHOPAEDIC SURGERY
Payer: MEDICARE

## 2025-06-18 VITALS
HEART RATE: 75 BPM | SYSTOLIC BLOOD PRESSURE: 129 MMHG | TEMPERATURE: 98 F | DIASTOLIC BLOOD PRESSURE: 76 MMHG | RESPIRATION RATE: 18 BRPM

## 2025-06-18 DIAGNOSIS — E08.621 DIABETIC ULCER OF TOE OF LEFT FOOT ASSOCIATED WITH DIABETES MELLITUS DUE TO UNDERLYING CONDITION, WITH FAT LAYER EXPOSED (HCC): ICD-10-CM

## 2025-06-18 DIAGNOSIS — L97.522 DIABETIC ULCER OF TOE OF LEFT FOOT ASSOCIATED WITH DIABETES MELLITUS DUE TO UNDERLYING CONDITION, WITH FAT LAYER EXPOSED (HCC): ICD-10-CM

## 2025-06-18 DIAGNOSIS — L97.422 DIABETIC ULCER OF LEFT MIDFOOT ASSOCIATED WITH DIABETES MELLITUS DUE TO UNDERLYING CONDITION, WITH FAT LAYER EXPOSED (HCC): Primary | ICD-10-CM

## 2025-06-18 DIAGNOSIS — E08.621 DIABETIC ULCER OF LEFT MIDFOOT ASSOCIATED WITH DIABETES MELLITUS DUE TO UNDERLYING CONDITION, WITH FAT LAYER EXPOSED (HCC): Primary | ICD-10-CM

## 2025-06-18 PROCEDURE — 11042 DBRDMT SUBQ TIS 1ST 20SQCM/<: CPT

## 2025-06-18 RX ORDER — LIDOCAINE 40 MG/G
CREAM TOPICAL PRN
OUTPATIENT
Start: 2025-06-18

## 2025-06-18 RX ORDER — BACITRACIN ZINC AND POLYMYXIN B SULFATE 500; 1000 [USP'U]/G; [USP'U]/G
OINTMENT TOPICAL PRN
OUTPATIENT
Start: 2025-06-18

## 2025-06-18 RX ORDER — BETAMETHASONE DIPROPIONATE 0.5 MG/G
CREAM TOPICAL PRN
OUTPATIENT
Start: 2025-06-18

## 2025-06-18 RX ORDER — SODIUM CHLOR/HYPOCHLOROUS ACID 0.033 %
SOLUTION, IRRIGATION IRRIGATION PRN
OUTPATIENT
Start: 2025-06-18

## 2025-06-18 RX ORDER — LIDOCAINE HYDROCHLORIDE 20 MG/ML
JELLY TOPICAL PRN
OUTPATIENT
Start: 2025-06-18

## 2025-06-18 RX ORDER — GENTAMICIN SULFATE 1 MG/G
OINTMENT TOPICAL PRN
OUTPATIENT
Start: 2025-06-18

## 2025-06-18 RX ORDER — MUPIROCIN 2 %
OINTMENT (GRAM) TOPICAL PRN
OUTPATIENT
Start: 2025-06-18

## 2025-06-18 RX ORDER — TRIAMCINOLONE ACETONIDE 1 MG/G
OINTMENT TOPICAL PRN
OUTPATIENT
Start: 2025-06-18

## 2025-06-18 RX ORDER — NEOMYCIN/BACITRACIN/POLYMYXINB 3.5-400-5K
OINTMENT (GRAM) TOPICAL PRN
OUTPATIENT
Start: 2025-06-18

## 2025-06-18 RX ORDER — CLOBETASOL PROPIONATE 0.5 MG/G
OINTMENT TOPICAL PRN
OUTPATIENT
Start: 2025-06-18

## 2025-06-18 RX ORDER — LIDOCAINE HYDROCHLORIDE 40 MG/ML
SOLUTION TOPICAL PRN
OUTPATIENT
Start: 2025-06-18

## 2025-06-18 RX ORDER — GINSENG 100 MG
CAPSULE ORAL PRN
OUTPATIENT
Start: 2025-06-18

## 2025-06-18 RX ORDER — LIDOCAINE 50 MG/G
OINTMENT TOPICAL PRN
OUTPATIENT
Start: 2025-06-18

## 2025-06-18 NOTE — FLOWSHEET NOTE
06/18/25 1030   Wound 04/23/25 Foot Plantar;Left   Date First Assessed/Time First Assessed: 04/23/25 0956   Wound Approximate Age at First Assessment (Weeks): 3 weeks  Primary Wound Type: Diabetic Ulcer  Location: Foot  Wound Location Orientation: Plantar;Left   Dressing/Treatment Alginate with Ag;Silicone border   Post-Procedure Length (cm) 0.7 cm   Post-Procedure Width (cm) 0.7 cm   Post-Procedure Depth (cm) 0.6 cm   Post-Procedure Surface Area (cm^2) 0.49 cm^2   Post-Procedure Volume (cm^3) 0.294 cm^3

## 2025-06-18 NOTE — PATIENT INSTRUCTIONS
Discharge Instructions [facility]  [address]   [address]  [place] [address]   Telephone: [phone]     FAX [phone]    NAME:  Job Ruelas  YOB: 1960  MEDICAL RECORD NUMBER:  [id number]  DATE:  6/18/2025    CPT code:Debridement SQ (18628)     WOUND CARE ORDERS:  Left foot, Plantar :Cleanse with soap and water or normal saline & gauze. Apply Silver Alginate, Cover with Silicone bordered Super Absorber. Patient may use large fabric band-aid at home.  Change every other day & as needed for compromise of dressing.    TREATMENT ORDERS:   Your Insurance plan will not cover the Foot Defender Offloading Boot. You may purchase online at [employer] or Contact \"[employer]\" [phone]   To purchase \"out of Pocket\"  Avoid prolonged standing in one place.   Elevate leg(s) above the level of the heart when sitting. Do not get dressing/cast wet.  Never walk on cast without Cast Boot.    Follow Diet as prescribed:   [] Diet as tolerated: [x] Calorie Diabetic Diet: Low carb and no Sugar [] No Added Salt:no more then 2,000 mg daily  [] Increase Protein: [] Limit the amount of liquid you are drinking and avoid drinking in between meals (limit to 2 quarts daily)     Return Appointment:  [x] Return Appointment: With Dr. Arnulfo Gan in 1 week  [] Nurse Visit :   [] Ordered tests:    Electronically signed CHRISTIANO SHAH RN on 6/18/2025 at 10:29 AM     Wound Care Center Information: Should you experience any significant changes in your wound(s) or have questions about your wound care, please contact the [facility] at MONDAY - FRIDAY 8:00 am - 4:30.  If you need help with your wound outside these hours and cannot wait until we are again available, contact your PCP or go to the hospital emergency room.   PLEASE NOTE: IF YOU ARE UNABLE TO OBTAIN WOUND SUPPLIES, CONTINUE TO USE THE SUPPLIES YOU HAVE AVAILABLE UNTIL YOU ARE ABLE TO REACH US. IT IS MOST

## 2025-06-18 NOTE — PROGRESS NOTES
6 HOURS FOR PAIN RELIEF. DO NOT EXCEED 2 WHOLE PILLS A DAY 10/2/24   Yes Provider, Historical, MD   atorvastatin (LIPITOR) 40 MG tablet TAKE 1 TABLET BY MOUTH EVERY DAY 10/2/24   Yes Rayna Trimble APRN - NP   gabapentin (NEURONTIN) 300 MG capsule Take 2 capsules by mouth 3 times daily for 90 days. Max Daily Amount: 1,800 mg 5/9/24 2/10/25 Yes Rayna Trimble APRN - NP   traZODone (DESYREL) 50 MG tablet TAKE ONE TABLET BY MOUTH DAILY AT 9PM NIGHTLY 4/3/24   Yes Rochelle Delgadillo APRN - NP   valsartan (DIOVAN) 80 MG tablet Take 1 tablet by mouth 2 times daily 1/25/24 2/10/25 Yes Provider, Historical, MD   clindamycin (CLEOCIN) 300 MG capsule   10/14/23   Yes Provider, Historical, MD   apixaban (ELIQUIS) 5 MG TABS tablet Take 1 tablet by mouth 2 times daily 1/9/23   Yes Automatic Reconciliation, Ar   albuterol sulfate HFA (PROVENTIL HFA) 108 (90 Base) MCG/ACT inhaler Inhale 2 puffs into the lungs every 6 hours as needed for Wheezing  Patient not taking: Reported on 2/10/2025 11/13/23     Rayna Trimble APRN - NP   furosemide (LASIX) 40 MG tablet Take 1 tablet by mouth daily  Patient not taking: Reported on 2/10/2025 2/6/23     Automatic Reconciliation, Ar   magnesium oxide (MAG-OX) 400 MG tablet Take 1 tablet by mouth daily  Patient not taking: Reported on 1/28/2025       Automatic Reconciliation, Ar   thiamine 100 MG tablet Take 1 tablet by mouth daily  Patient not taking: Reported on 1/28/2025 2/17/23     Automatic Reconciliation, Ar         Allergies             Allergies   Allergen Reactions    Soap Hives       Patient is allergic to Sween body lotion, applied on face twice during hospital stay, with extensive erythema on face. Improving with topical steroids.     Sulfamethoxazole-Trimethoprim Rash       Pt states rash on left arm developed, \"pretty severe\". Pt states Dr told him not to take this anymore.    Ceftriaxone Dermatitis and Rash    Vancomycin Dermatitis and Rash            Review of

## 2025-06-18 NOTE — FLOWSHEET NOTE
06/18/25 0942   Wound 04/23/25 Foot Plantar;Left   Date First Assessed/Time First Assessed: 04/23/25 0956   Wound Approximate Age at First Assessment (Weeks): 3 weeks  Primary Wound Type: Diabetic Ulcer  Location: Foot  Wound Location Orientation: Plantar;Left   Wound Image    Wound Etiology Diabetic   Dressing Status Old drainage noted   Wound Cleansed Cleansed with saline   Wound Length (cm) 0.4 cm   Wound Width (cm) 0.3 cm   Wound Depth (cm) 0.4 cm   Wound Surface Area (cm^2) 0.12 cm^2   Change in Wound Size % (l*w) 96   Wound Volume (cm^3) 0.048 cm^3   Wound Healing % 98   Wound Assessment Pink/red   Drainage Amount Small (< 25%)   Drainage Description Serous   Odor None   Gianna-wound Assessment Hyperkeratosis (callous)   Margins Defined edges   Wound Thickness Description not for Pressure Injury Full thickness   Pain Assessment   Pain Assessment None - Denies Pain       /76   Pulse 75   Temp 98 °F (36.7 °C) (Temporal)   Resp 18

## 2025-07-03 ENCOUNTER — TELEPHONE (OUTPATIENT)
Age: 65
End: 2025-07-03

## 2025-07-03 PROBLEM — Z12.11 SCREENING FOR COLON CANCER: Status: ACTIVE | Noted: 2025-04-30

## 2025-07-03 NOTE — TELEPHONE ENCOUNTER
Called patient, two patient identifiers used for patient verification, name and .     Discussed bowel prep instructions with Patient and medication review.  Patient also requested a copy of instructions be sent to him via [employer].    Patient voiced thanks and understanding.

## 2025-07-07 RX ORDER — SODIUM, POTASSIUM,MAG SULFATES 17.5-3.13G
1 SOLUTION, RECONSTITUTED, ORAL ORAL SEE ADMIN INSTRUCTIONS
Qty: 1 KIT | Refills: 0 | Status: SHIPPED | OUTPATIENT
Start: 2025-07-07

## 2025-07-20 ENCOUNTER — APPOINTMENT (OUTPATIENT)
Facility: HOSPITAL | Age: 65
End: 2025-07-20
Payer: MEDICARE

## 2025-07-20 ENCOUNTER — HOSPITAL ENCOUNTER (EMERGENCY)
Facility: HOSPITAL | Age: 65
Discharge: ANOTHER HOSPITAL | End: 2025-07-21
Attending: EMERGENCY MEDICINE
Payer: MEDICARE

## 2025-07-20 DIAGNOSIS — M86.9 OSTEOMYELITIS OF LEFT FOOT, UNSPECIFIED TYPE (HCC): ICD-10-CM

## 2025-07-20 DIAGNOSIS — A41.9 SEPSIS, DUE TO UNSPECIFIED ORGANISM, UNSPECIFIED WHETHER ACUTE ORGAN DYSFUNCTION PRESENT (HCC): Primary | ICD-10-CM

## 2025-07-20 LAB
ALBUMIN SERPL-MCNC: 2.9 G/DL (ref 3.5–5)
ALBUMIN/GLOB SERPL: 0.7 (ref 1.1–2.2)
ALP SERPL-CCNC: 102 U/L (ref 45–117)
ALT SERPL-CCNC: 13 U/L (ref 12–78)
ANION GAP SERPL CALC-SCNC: 10 MMOL/L (ref 2–12)
AST SERPL-CCNC: 12 U/L (ref 15–37)
BASOPHILS # BLD: 0.02 K/UL (ref 0–0.1)
BASOPHILS NFR BLD: 0.2 % (ref 0–1)
BILIRUB SERPL-MCNC: 0.9 MG/DL (ref 0.2–1)
BUN SERPL-MCNC: 37 MG/DL (ref 6–20)
BUN/CREAT SERPL: 19 (ref 12–20)
CALCIUM SERPL-MCNC: 8.7 MG/DL (ref 8.5–10.1)
CHLORIDE SERPL-SCNC: 99 MMOL/L (ref 97–108)
CO2 SERPL-SCNC: 26 MMOL/L (ref 21–32)
CREAT SERPL-MCNC: 1.9 MG/DL (ref 0.7–1.3)
DIFFERENTIAL METHOD BLD: ABNORMAL
EOSINOPHIL # BLD: 0.03 K/UL (ref 0–0.4)
EOSINOPHIL NFR BLD: 0.3 % (ref 0–7)
ERYTHROCYTE [DISTWIDTH] IN BLOOD BY AUTOMATED COUNT: 15.4 % (ref 11.5–14.5)
GLOBULIN SER CALC-MCNC: 4.4 G/DL (ref 2–4)
GLUCOSE SERPL-MCNC: 137 MG/DL (ref 65–100)
HCT VFR BLD AUTO: 38.7 % (ref 36.6–50.3)
HGB BLD-MCNC: 12.8 G/DL (ref 12.1–17)
IMM GRANULOCYTES # BLD AUTO: 0.04 K/UL (ref 0–0.04)
IMM GRANULOCYTES NFR BLD AUTO: 0.4 % (ref 0–0.5)
LACTATE SERPL-SCNC: 1.5 MMOL/L (ref 0.4–2)
LYMPHOCYTES # BLD: 0.81 K/UL (ref 0.8–3.5)
LYMPHOCYTES NFR BLD: 7.6 % (ref 12–49)
MCH RBC QN AUTO: 30.5 PG (ref 26–34)
MCHC RBC AUTO-ENTMCNC: 33.1 G/DL (ref 30–36.5)
MCV RBC AUTO: 92.4 FL (ref 80–99)
MONOCYTES # BLD: 0.9 K/UL (ref 0–1)
MONOCYTES NFR BLD: 8.5 % (ref 5–13)
NEUTS SEG # BLD: 8.84 K/UL (ref 1.8–8)
NEUTS SEG NFR BLD: 83 % (ref 32–75)
NRBC # BLD: 0 K/UL (ref 0–0.01)
NRBC BLD-RTO: 0 PER 100 WBC
PLATELET # BLD AUTO: 179 K/UL (ref 150–400)
PMV BLD AUTO: 10.2 FL (ref 8.9–12.9)
POTASSIUM SERPL-SCNC: 4 MMOL/L (ref 3.5–5.1)
PROT SERPL-MCNC: 7.3 G/DL (ref 6.4–8.2)
RBC # BLD AUTO: 4.19 M/UL (ref 4.1–5.7)
SODIUM SERPL-SCNC: 135 MMOL/L (ref 136–145)
TROPONIN I SERPL HS-MCNC: 33 NG/L (ref 0–76)
WBC # BLD AUTO: 10.6 K/UL (ref 4.1–11.1)

## 2025-07-20 PROCEDURE — 94640 AIRWAY INHALATION TREATMENT: CPT

## 2025-07-20 PROCEDURE — 73630 X-RAY EXAM OF FOOT: CPT

## 2025-07-20 PROCEDURE — 73701 CT LOWER EXTREMITY W/DYE: CPT

## 2025-07-20 PROCEDURE — 83605 ASSAY OF LACTIC ACID: CPT

## 2025-07-20 PROCEDURE — 80053 COMPREHEN METABOLIC PANEL: CPT

## 2025-07-20 PROCEDURE — 6360000002 HC RX W HCPCS: Performed by: EMERGENCY MEDICINE

## 2025-07-20 PROCEDURE — 96367 TX/PROPH/DG ADDL SEQ IV INF: CPT

## 2025-07-20 PROCEDURE — 84484 ASSAY OF TROPONIN QUANT: CPT

## 2025-07-20 PROCEDURE — 87040 BLOOD CULTURE FOR BACTERIA: CPT

## 2025-07-20 PROCEDURE — 6370000000 HC RX 637 (ALT 250 FOR IP): Performed by: EMERGENCY MEDICINE

## 2025-07-20 PROCEDURE — 6360000004 HC RX CONTRAST MEDICATION: Performed by: EMERGENCY MEDICINE

## 2025-07-20 PROCEDURE — 99285 EMERGENCY DEPT VISIT HI MDM: CPT

## 2025-07-20 PROCEDURE — 96365 THER/PROPH/DIAG IV INF INIT: CPT

## 2025-07-20 PROCEDURE — 2580000003 HC RX 258: Performed by: EMERGENCY MEDICINE

## 2025-07-20 PROCEDURE — 96375 TX/PRO/DX INJ NEW DRUG ADDON: CPT

## 2025-07-20 PROCEDURE — 71045 X-RAY EXAM CHEST 1 VIEW: CPT

## 2025-07-20 PROCEDURE — 85025 COMPLETE CBC W/AUTO DIFF WBC: CPT

## 2025-07-20 PROCEDURE — 36415 COLL VENOUS BLD VENIPUNCTURE: CPT

## 2025-07-20 PROCEDURE — 93005 ELECTROCARDIOGRAM TRACING: CPT | Performed by: EMERGENCY MEDICINE

## 2025-07-20 PROCEDURE — 84145 PROCALCITONIN (PCT): CPT

## 2025-07-20 RX ORDER — ALBUTEROL SULFATE 0.83 MG/ML
2.5 SOLUTION RESPIRATORY (INHALATION)
Status: COMPLETED | OUTPATIENT
Start: 2025-07-20 | End: 2025-07-20

## 2025-07-20 RX ORDER — ACETAMINOPHEN 325 MG/1
650 TABLET ORAL
Status: COMPLETED | OUTPATIENT
Start: 2025-07-20 | End: 2025-07-20

## 2025-07-20 RX ORDER — FENTANYL CITRATE 50 UG/ML
50 INJECTION, SOLUTION INTRAMUSCULAR; INTRAVENOUS
Refills: 0 | Status: COMPLETED | OUTPATIENT
Start: 2025-07-20 | End: 2025-07-20

## 2025-07-20 RX ORDER — IOPAMIDOL 755 MG/ML
100 INJECTION, SOLUTION INTRAVASCULAR
Status: COMPLETED | OUTPATIENT
Start: 2025-07-20 | End: 2025-07-20

## 2025-07-20 RX ORDER — CLINDAMYCIN PHOSPHATE 900 MG/50ML
900 INJECTION, SOLUTION INTRAVENOUS
Status: COMPLETED | OUTPATIENT
Start: 2025-07-20 | End: 2025-07-21

## 2025-07-20 RX ORDER — 0.9 % SODIUM CHLORIDE 0.9 %
500 INTRAVENOUS SOLUTION INTRAVENOUS ONCE
Status: COMPLETED | OUTPATIENT
Start: 2025-07-20 | End: 2025-07-20

## 2025-07-20 RX ADMIN — SODIUM CHLORIDE 500 ML: 0.9 INJECTION, SOLUTION INTRAVENOUS at 23:17

## 2025-07-20 RX ADMIN — CLINDAMYCIN PHOSPHATE 900 MG: 900 INJECTION, SOLUTION INTRAVENOUS at 23:21

## 2025-07-20 RX ADMIN — IOPAMIDOL 100 ML: 755 INJECTION, SOLUTION INTRAVENOUS at 21:36

## 2025-07-20 RX ADMIN — FENTANYL CITRATE 50 MCG: 50 INJECTION, SOLUTION INTRAMUSCULAR; INTRAVENOUS at 20:34

## 2025-07-20 RX ADMIN — ACETAMINOPHEN 650 MG: 325 TABLET ORAL at 20:33

## 2025-07-20 RX ADMIN — ALBUTEROL SULFATE 2.5 MG: 2.5 SOLUTION RESPIRATORY (INHALATION) at 22:02

## 2025-07-20 RX ADMIN — PIPERACILLIN AND TAZOBACTAM 4500 MG: 4; .5 INJECTION, POWDER, FOR SOLUTION INTRAVENOUS at 20:38

## 2025-07-20 ASSESSMENT — PAIN SCALES - GENERAL
PAINLEVEL_OUTOF10: 9
PAINLEVEL_OUTOF10: 9

## 2025-07-20 ASSESSMENT — PAIN DESCRIPTION - LOCATION: LOCATION: FOOT

## 2025-07-20 ASSESSMENT — PAIN - FUNCTIONAL ASSESSMENT: PAIN_FUNCTIONAL_ASSESSMENT: 0-10

## 2025-07-20 ASSESSMENT — PAIN DESCRIPTION - DESCRIPTORS: DESCRIPTORS: SHOOTING;TENDER

## 2025-07-20 ASSESSMENT — PAIN DESCRIPTION - ORIENTATION: ORIENTATION: LEFT

## 2025-07-20 NOTE — ED PROVIDER NOTES
[facility] EMERGENCY DEPARTMENT  EMERGENCY DEPARTMENT ENCOUNTER       Pt Name: Job Ruelas  MRN: [id number]  Birthdate 1960  Date of evaluation: 7/20/2025  Provider: Nella Hooks MD   PCP: Rayna Trimble APRN - NP  Note Started: 7:39 PM EDT 7/20/25     CHIEF COMPLAINT       Chief Complaint   Patient presents with    Wound Check     Left foot        HISTORY OF PRESENT ILLNESS: 1 or more elements      History From: Patient  HPI Limitations: None     Job Ruelas is a 65 y.o. male with history of diabetes, hypertension, nonhealing wound on left foot, CHF, aortic valve replacement on Eliquis who presents to the ED with left foot pain, and swelling for the past 2 days.  Patient has been followed by wound care at [facility] Dr. Gan).  He had foot in a cast until about 2 weeks ago when it was removed.  Yesterday he noticed swelling and walked on it through the day.  This morning, he had increased swelling and pain.  He soaked it today.  This afternoon he developed some nausea and vomiting and some shortness of breath.  Denies any chest pain.  Febrile on arrival to ED, but he was unaware that he had a fever.  REVIEW OF SYSTEMS      Review of Systems     Positives and Pertinent negatives as per HPI.    PAST HISTORY     Past Medical History:  Past Medical History:   Diagnosis Date    Blister of foot, left     diabetic blister    Diabetes (HCC)     Hypertension          Past Surgical History:  Past Surgical History:   Procedure Laterality Date    AMPUTATION      CARDIAC SURGERY         Family History:  Family History   Problem Relation Age of Onset    Cancer Mother     Cancer Sister     Cancer Brother        Social History:  Social History     Tobacco Use    Smoking status: Never    Smokeless tobacco: Never   Vaping Use    Vaping status: Never Used   Substance Use Topics    Alcohol use: Not Currently    Drug use: Never       Allergies:  Allergies   Allergen Reactions    Soap Hives     Patient is

## 2025-07-20 NOTE — ED TRIAGE NOTES
Pt arrived by EMS for diabetic foot ulcer.  Per EMS pt reported he developed a blister on his foot 4 months ago and it has progressively gotten worse, pt with history of neuropathy, diabetes and previous amputation of toes, pt complains of pain in his left foot that is radiating up his eg with swelling and warm to touch.  Pt noted with a cough that he states started PTA.  Pt is awake alert and oriented X 4,  pt was able to stand and pivot to get on the awaiting stretcher from the EMS stretcher.  Pt educated on ER flow

## 2025-07-21 ENCOUNTER — ANESTHESIA EVENT (OUTPATIENT)
Facility: HOSPITAL | Age: 65
End: 2025-07-21
Payer: MEDICARE

## 2025-07-21 ENCOUNTER — HOSPITAL ENCOUNTER (INPATIENT)
Facility: HOSPITAL | Age: 65
LOS: 5 days | Discharge: NURSING, REHAB OR LONG-TERM CARE | End: 2025-07-26
Attending: ORTHOPAEDIC SURGERY | Admitting: FAMILY MEDICINE
Payer: MEDICARE

## 2025-07-21 ENCOUNTER — APPOINTMENT (OUTPATIENT)
Facility: HOSPITAL | Age: 65
End: 2025-07-21
Payer: MEDICARE

## 2025-07-21 ENCOUNTER — APPOINTMENT (OUTPATIENT)
Facility: HOSPITAL | Age: 65
End: 2025-07-21
Attending: ORTHOPAEDIC SURGERY
Payer: MEDICARE

## 2025-07-21 VITALS
HEIGHT: 72 IN | OXYGEN SATURATION: 96 % | BODY MASS INDEX: 35.68 KG/M2 | RESPIRATION RATE: 18 BRPM | TEMPERATURE: 98 F | SYSTOLIC BLOOD PRESSURE: 164 MMHG | DIASTOLIC BLOOD PRESSURE: 83 MMHG | HEART RATE: 97 BPM | WEIGHT: 263.4 LBS

## 2025-07-21 DIAGNOSIS — E08.621 DIABETIC ULCER OF TOE OF LEFT FOOT ASSOCIATED WITH DIABETES MELLITUS DUE TO UNDERLYING CONDITION, WITH FAT LAYER EXPOSED (HCC): Primary | ICD-10-CM

## 2025-07-21 DIAGNOSIS — E11.628 DIABETIC FOOT INFECTION (HCC): ICD-10-CM

## 2025-07-21 DIAGNOSIS — L97.522 DIABETIC ULCER OF TOE OF LEFT FOOT ASSOCIATED WITH DIABETES MELLITUS DUE TO UNDERLYING CONDITION, WITH FAT LAYER EXPOSED (HCC): Primary | ICD-10-CM

## 2025-07-21 DIAGNOSIS — L08.9 DIABETIC FOOT INFECTION (HCC): ICD-10-CM

## 2025-07-21 PROBLEM — M86.172 ACUTE OSTEOMYELITIS OF LEFT FOOT (HCC): Status: ACTIVE | Noted: 2025-07-21

## 2025-07-21 PROBLEM — M86.9 OSTEOMYELITIS (HCC): Status: ACTIVE | Noted: 2025-07-21

## 2025-07-21 LAB
APPEARANCE UR: CLEAR
BACTERIA URNS QL MICRO: NEGATIVE /HPF
BILIRUB UR QL: NEGATIVE
COLOR UR: ABNORMAL
CREAT UR-MCNC: 99.5 MG/DL
EPITH CASTS URNS QL MICRO: ABNORMAL /LPF
ERYTHROCYTE [SEDIMENTATION RATE] IN BLOOD: 62 MM/HR (ref 0–20)
EST. AVERAGE GLUCOSE BLD GHB EST-MCNC: 111 MG/DL
GLUCOSE BLD STRIP.AUTO-MCNC: 121 MG/DL (ref 65–117)
GLUCOSE BLD STRIP.AUTO-MCNC: 134 MG/DL (ref 65–117)
GLUCOSE BLD STRIP.AUTO-MCNC: 138 MG/DL (ref 65–117)
GLUCOSE BLD STRIP.AUTO-MCNC: 93 MG/DL (ref 65–117)
GLUCOSE UR STRIP.AUTO-MCNC: >1000 MG/DL
HBA1C MFR BLD: 5.5 % (ref 4–5.6)
HGB UR QL STRIP: NEGATIVE
HYALINE CASTS URNS QL MICRO: ABNORMAL /LPF (ref 0–5)
KETONES UR QL STRIP.AUTO: NEGATIVE MG/DL
LEUKOCYTE ESTERASE UR QL STRIP.AUTO: NEGATIVE
NITRITE UR QL STRIP.AUTO: NEGATIVE
PH UR STRIP: 5.5 (ref 5–8)
PROT UR STRIP-MCNC: 30 MG/DL
PROT UR-MCNC: 45 MG/DL (ref 0–11.9)
PROT/CREAT UR-RTO: 0.5
RBC #/AREA URNS HPF: ABNORMAL /HPF (ref 0–5)
SERVICE CMNT-IMP: ABNORMAL
SERVICE CMNT-IMP: NORMAL
SP GR UR REFRACTOMETRY: 1.03 (ref 1–1.03)
SPECIMEN HOLD: NORMAL
UROBILINOGEN UR QL STRIP.AUTO: 1 EU/DL (ref 0.2–1)
WBC URNS QL MICRO: ABNORMAL /HPF (ref 0–4)

## 2025-07-21 PROCEDURE — 6360000002 HC RX W HCPCS: Performed by: FAMILY MEDICINE

## 2025-07-21 PROCEDURE — 85652 RBC SED RATE AUTOMATED: CPT

## 2025-07-21 PROCEDURE — 82962 GLUCOSE BLOOD TEST: CPT

## 2025-07-21 PROCEDURE — 6370000000 HC RX 637 (ALT 250 FOR IP): Performed by: NURSE PRACTITIONER

## 2025-07-21 PROCEDURE — 82570 ASSAY OF URINE CREATININE: CPT

## 2025-07-21 PROCEDURE — 84156 ASSAY OF PROTEIN URINE: CPT

## 2025-07-21 PROCEDURE — 81001 URINALYSIS AUTO W/SCOPE: CPT

## 2025-07-21 PROCEDURE — 2500000003 HC RX 250 WO HCPCS: Performed by: NURSE PRACTITIONER

## 2025-07-21 PROCEDURE — 93926 LOWER EXTREMITY STUDY: CPT

## 2025-07-21 PROCEDURE — 76770 US EXAM ABDO BACK WALL COMP: CPT

## 2025-07-21 PROCEDURE — 83036 HEMOGLOBIN GLYCOSYLATED A1C: CPT

## 2025-07-21 PROCEDURE — G0545 PR INHERENT VISIT TO INPT: HCPCS | Performed by: INTERNAL MEDICINE

## 2025-07-21 PROCEDURE — 99222 1ST HOSP IP/OBS MODERATE 55: CPT | Performed by: INTERNAL MEDICINE

## 2025-07-21 PROCEDURE — 1100000000 HC RM PRIVATE

## 2025-07-21 RX ORDER — ATORVASTATIN CALCIUM 40 MG/1
40 TABLET, FILM COATED ORAL NIGHTLY
Status: DISCONTINUED | OUTPATIENT
Start: 2025-07-21 | End: 2025-07-26 | Stop reason: HOSPADM

## 2025-07-21 RX ORDER — SODIUM CHLORIDE 0.9 % (FLUSH) 0.9 %
5-40 SYRINGE (ML) INJECTION PRN
Status: DISCONTINUED | OUTPATIENT
Start: 2025-07-21 | End: 2025-07-26 | Stop reason: HOSPADM

## 2025-07-21 RX ORDER — GABAPENTIN 600 MG/1
600 TABLET ORAL 3 TIMES DAILY
Status: DISCONTINUED | OUTPATIENT
Start: 2025-07-21 | End: 2025-07-26 | Stop reason: HOSPADM

## 2025-07-21 RX ORDER — ONDANSETRON 4 MG/1
4 TABLET, ORALLY DISINTEGRATING ORAL EVERY 8 HOURS PRN
Status: DISCONTINUED | OUTPATIENT
Start: 2025-07-21 | End: 2025-07-26 | Stop reason: HOSPADM

## 2025-07-21 RX ORDER — POTASSIUM CHLORIDE 7.45 MG/ML
10 INJECTION INTRAVENOUS PRN
Status: DISCONTINUED | OUTPATIENT
Start: 2025-07-21 | End: 2025-07-22

## 2025-07-21 RX ORDER — MAGNESIUM SULFATE IN WATER 40 MG/ML
2000 INJECTION, SOLUTION INTRAVENOUS PRN
Status: DISCONTINUED | OUTPATIENT
Start: 2025-07-21 | End: 2025-07-22

## 2025-07-21 RX ORDER — POLYETHYLENE GLYCOL 3350 17 G/17G
17 POWDER, FOR SOLUTION ORAL DAILY PRN
Status: DISCONTINUED | OUTPATIENT
Start: 2025-07-21 | End: 2025-07-26 | Stop reason: HOSPADM

## 2025-07-21 RX ORDER — POTASSIUM CHLORIDE 750 MG/1
40 TABLET, EXTENDED RELEASE ORAL PRN
Status: DISCONTINUED | OUTPATIENT
Start: 2025-07-21 | End: 2025-07-22

## 2025-07-21 RX ORDER — OXYCODONE AND ACETAMINOPHEN 10; 325 MG/1; MG/1
1 TABLET ORAL EVERY 4 HOURS PRN
Refills: 0 | Status: DISCONTINUED | OUTPATIENT
Start: 2025-07-21 | End: 2025-07-26 | Stop reason: HOSPADM

## 2025-07-21 RX ORDER — SODIUM CHLORIDE 0.9 % (FLUSH) 0.9 %
5-40 SYRINGE (ML) INJECTION EVERY 12 HOURS SCHEDULED
Status: DISCONTINUED | OUTPATIENT
Start: 2025-07-21 | End: 2025-07-26 | Stop reason: HOSPADM

## 2025-07-21 RX ORDER — AMIODARONE HYDROCHLORIDE 200 MG/1
200 TABLET ORAL DAILY
Status: DISCONTINUED | OUTPATIENT
Start: 2025-07-21 | End: 2025-07-26 | Stop reason: HOSPADM

## 2025-07-21 RX ORDER — ACETAMINOPHEN 650 MG/1
650 SUPPOSITORY RECTAL EVERY 6 HOURS PRN
Status: DISCONTINUED | OUTPATIENT
Start: 2025-07-21 | End: 2025-07-26 | Stop reason: HOSPADM

## 2025-07-21 RX ORDER — ENOXAPARIN SODIUM 100 MG/ML
40 INJECTION SUBCUTANEOUS DAILY
Status: DISCONTINUED | OUTPATIENT
Start: 2025-07-21 | End: 2025-07-21 | Stop reason: SDUPTHER

## 2025-07-21 RX ORDER — ONDANSETRON 2 MG/ML
4 INJECTION INTRAMUSCULAR; INTRAVENOUS EVERY 6 HOURS PRN
Status: DISCONTINUED | OUTPATIENT
Start: 2025-07-21 | End: 2025-07-26 | Stop reason: HOSPADM

## 2025-07-21 RX ORDER — SODIUM CHLORIDE 9 MG/ML
INJECTION, SOLUTION INTRAVENOUS PRN
Status: DISCONTINUED | OUTPATIENT
Start: 2025-07-21 | End: 2025-07-26 | Stop reason: HOSPADM

## 2025-07-21 RX ORDER — ENOXAPARIN SODIUM 100 MG/ML
30 INJECTION SUBCUTANEOUS 2 TIMES DAILY
Status: DISCONTINUED | OUTPATIENT
Start: 2025-07-21 | End: 2025-07-23

## 2025-07-21 RX ORDER — ACETAMINOPHEN 325 MG/1
650 TABLET ORAL EVERY 6 HOURS PRN
Status: DISCONTINUED | OUTPATIENT
Start: 2025-07-21 | End: 2025-07-26 | Stop reason: HOSPADM

## 2025-07-21 RX ORDER — ALBUTEROL SULFATE 90 UG/1
2 INHALANT RESPIRATORY (INHALATION) EVERY 6 HOURS PRN
Status: DISCONTINUED | OUTPATIENT
Start: 2025-07-21 | End: 2025-07-21 | Stop reason: CLARIF

## 2025-07-21 RX ORDER — TRAZODONE HYDROCHLORIDE 50 MG/1
50 TABLET ORAL NIGHTLY
Status: DISCONTINUED | OUTPATIENT
Start: 2025-07-21 | End: 2025-07-26 | Stop reason: HOSPADM

## 2025-07-21 RX ORDER — ALBUTEROL SULFATE 0.83 MG/ML
2.5 SOLUTION RESPIRATORY (INHALATION) EVERY 6 HOURS PRN
Status: DISCONTINUED | OUTPATIENT
Start: 2025-07-21 | End: 2025-07-26 | Stop reason: HOSPADM

## 2025-07-21 RX ADMIN — OXYCODONE AND ACETAMINOPHEN 1 TABLET: 10; 325 TABLET ORAL at 10:40

## 2025-07-21 RX ADMIN — AMIODARONE HYDROCHLORIDE 200 MG: 200 TABLET ORAL at 10:40

## 2025-07-21 RX ADMIN — TRAZODONE HYDROCHLORIDE 50 MG: 50 TABLET ORAL at 21:02

## 2025-07-21 RX ADMIN — ENOXAPARIN SODIUM 30 MG: 100 INJECTION SUBCUTANEOUS at 21:02

## 2025-07-21 RX ADMIN — ATORVASTATIN CALCIUM 40 MG: 40 TABLET, FILM COATED ORAL at 21:02

## 2025-07-21 RX ADMIN — OXYCODONE AND ACETAMINOPHEN 1 TABLET: 10; 325 TABLET ORAL at 17:36

## 2025-07-21 RX ADMIN — GABAPENTIN 600 MG: 600 TABLET, FILM COATED ORAL at 10:40

## 2025-07-21 RX ADMIN — SODIUM CHLORIDE, PRESERVATIVE FREE 10 ML: 5 INJECTION INTRAVENOUS at 21:02

## 2025-07-21 RX ADMIN — GABAPENTIN 600 MG: 600 TABLET, FILM COATED ORAL at 21:02

## 2025-07-21 RX ADMIN — GABAPENTIN 600 MG: 600 TABLET, FILM COATED ORAL at 13:06

## 2025-07-21 ASSESSMENT — PAIN DESCRIPTION - LOCATION
LOCATION: FOOT

## 2025-07-21 ASSESSMENT — PAIN SCALES - GENERAL
PAINLEVEL_OUTOF10: 7
PAINLEVEL_OUTOF10: 8
PAINLEVEL_OUTOF10: 5
PAINLEVEL_OUTOF10: 6
PAINLEVEL_OUTOF10: 6
PAINLEVEL_OUTOF10: 7
PAINLEVEL_OUTOF10: 6

## 2025-07-21 ASSESSMENT — PAIN DESCRIPTION - DESCRIPTORS
DESCRIPTORS: ACHING

## 2025-07-21 ASSESSMENT — PAIN DESCRIPTION - ORIENTATION
ORIENTATION: LEFT

## 2025-07-21 NOTE — CONSULTS
Foot and Ankle Surgery Consult    Subjective:      Job Ruelas is a 65 y.o. male who presents for evaluation of left foot infection. Patient has a long history of foot infection + 5 years. He has been treated with TCC for the last several months. Last week the area broke down significantly and became infected. Patient states he really needs a Right total shoulder but that's not possible with his foot infection.     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 Lives with son.   He would like to go to [facility] vs [facility] after surgery due to lack of family availability to help him     Past Medical History:   Diagnosis Date    Blister of foot, left     diabetic blister    Diabetes (HCC)     Hypertension      Past Surgical History:   Procedure Laterality Date    AMPUTATION      CARDIAC SURGERY        Family History   Problem Relation Age of Onset    Cancer Mother     Cancer Sister     Cancer Brother      Social History     Socioeconomic History    Marital status:     Years of education: 11   Tobacco Use    Smoking status: Never    Smokeless tobacco: Never   Vaping Use    Vaping status: Never Used   Substance and Sexual Activity    Alcohol use: Not Currently    Drug use: Never    Sexual activity: Not Currently     Partners: Female     Social Drivers of Health     Financial Resource Strain: Low Risk  (11/25/2024)    Overall Financial Resource Strain (CARDIA)     Difficulty of Paying Living Expenses: Not hard at all   Food Insecurity: No Food Insecurity (1/28/2025)    Hunger Vital Sign     Worried About Running Out of Food in the Last Year: Never true     Ran Out of Food in the Last Year: Never true   Transportation Needs: No Transportation Needs (1/28/2025)    PRAPARE - Transportation     Lack of Transportation (Medical): No     Lack of Transportation (Non-Medical): No   Physical Activity: Inactive (3/17/2025)    Exercise Vital Sign     Days of Exercise per Week: 0 days     Minutes of Exercise per Session: 0  edema throughout the extending into the toes, especially of the third toe. The patient is status post amputation of the hallux at the level of the MTP articulation, and amputation of the second ray at the base of the proximal shaft of the metatarsal. There is a plantar subcutaneous ulceration overlying the head of the third metatarsal bone which measures 12 mm transverse and 13 mm craniocaudal. Sinus tract extends to the plantar cortex and there are several foci of soft tissue and intraosseous gas within and adjacent to the metatarsal head. Cortical destruction of the metatarsal head is noted as well as the interosseous loss of fat attenuation extending at and distal to the neck. Findings are consistent with osteomyelitis. There is dorsal dislocation of the third MTP articulation with plantar foci of intra-articular gas. There is likely MTP septic arthritis. Loci of soft tissue gas tract distally within the toe especially along the flexor digitorum longus indicative of septic tenosynovitis. There is dorsal soft tissue gas are present as well.     1. Plantar cutaneous ulceration overlying the head of third metatarsal bone. Sinus tract extends to plantar cortex of the metatarsal head. 2. Osteomyelitis of the second metatarsal head and neck. 3. Septic arthritis of the third MTP articulation with dorsal dislocation of the phalangeal base. 4. Soft tissue gas extends within the third toe distally to the level of the middle phalanx including within flexor digitorum tendon sheath, indicative of septic tenosynovitis. Electronically signed by Drake Stewart MD    XR CHEST PORTABLE  Result Date: 7/20/2025  EXAM: XR CHEST PORTABLE, XR FOOT LEFT (MIN 3 VIEWS) INDICATION: SIRS, foot wound with fever COMPARISON: None TECHNIQUE: Single view of the chest. 3 views of the left foot. FINDINGS: Chest: Cardiomediastinal silhouette: Within normal limits. Lungs: No focal airspace opacity. Pleura: No pleural effusion. No pneumothorax. Bones:

## 2025-07-21 NOTE — H&P
History and Physical    Date of Service:  7/21/2025  Primary Care Provider: Rayna Trimble APRN - NP  Source of information: The patient and Chart review    Chief Complaint: No chief complaint on file.      History of Presenting Illness:   Job Ruelas is a 65 y.o. male with a past medical history significant for diabetes, hypertension, A-fib on AC therapy, diabetic neuropathy and a nonhealing left foot wound who was transferred from [facility] with osteomyelitis.  Patient reports he has been in a cast for 3 months up until 2 weeks ago at which time the cast was removed and he was told to use a walking boot. Patient has been followed by Dr Gan at [facility] wound care.  He noticed increased swelling and pain in his left foot and that it \"started turning colors yesterday\".  He soaked the foot in Epsom salts but the swelling and pain continued and he developed some nausea and vomiting as well as shortness of breath so he went to the hospital.  The nausea and vomiting have abated as well as the shortness of breath however on imaging he was found to have osteomyelitis of the second metatarsal head and neck and septic arthritis of the third MTP articulation with dorsal dislocation of the fifth phalangeal base and soft tissues gas extending within the third toe distally indicative of septic tenosynovitis.  The patient was transferred to [facility] for evaluation by podiatry.  He has had previous amputation of the 1st and 2nd toes of the L foot 5 years ago at .    He reports his blood sugars run generally between 130 and 160.  He is followed by Dr. Loyd at [facility] for cardiology.  Reports he needs a right shoulder replacement but the left foot issue has prevented that from occurring.  He receives Percocet from a pain management specialist.  He lives with his son and his son's girlfriend and is concerned if he has surgery about returning home without going to rehab to assist with wound  GM/177ML SOLN solution Take 354 mLs by mouth See Admin Instructions 7/7/25   Kingston Garza MD   albuterol sulfate HFA (VENTOLIN HFA) 108 (90 Base) MCG/ACT inhaler Inhale 2 puffs into the lungs 4 times daily as needed for Wheezing 3/27/25   Nella Hooks MD   diclofenac sodium (VOLTAREN) 1 % GEL APPLY 2 G TOPICALLY 4 TIMES A DAY 11/4/24   Rayna Trimble APRN - NP   albuterol sulfate HFA (PROVENTIL HFA) 108 (90 Base) MCG/ACT inhaler Inhale 2 puffs into the lungs every 6 hours as needed for Wheezing 11/13/23   Rayna Trimble APRN - NP   magnesium oxide (MAG-OX) 400 MG tablet Take 1 tablet by mouth daily    Automatic Reconciliation, Ar     Allergies   Allergen Reactions    Soap Hives     Patient is allergic to Sween body lotion, applied on face twice during hospital stay, with extensive erythema on face. Improving with topical steroids.     Sulfamethoxazole-Trimethoprim Rash     Pt states rash on left arm developed, \"pretty severe\". Pt states Dr told him not to take this anymore.    Ceftriaxone Dermatitis and Rash    Vancomycin Dermatitis and Rash      Family History   Problem Relation Age of Onset    Cancer Mother     Cancer Sister     Cancer Brother       Social History:  reports that he has never smoked. He has never used smokeless tobacco. He reports that he does not currently use alcohol. He reports that he does not use drugs.   Social Drivers of Health     Tobacco Use: Low Risk  (7/20/2025)    Patient History     Smoking Tobacco Use: Never     Smokeless Tobacco Use: Never     Passive Exposure: Not on file   Alcohol Use: Not At Risk (7/20/2025)    AUDIT-C     Frequency of Alcohol Consumption: Never     Average Number of Drinks: Patient does not drink     Frequency of Binge Drinking: Never   Financial Resource Strain: Low Risk  (11/25/2024)    Overall Financial Resource Strain (CARDIA)     Difficulty of Paying Living Expenses: Not hard at all   Food Insecurity: No Food Insecurity (1/28/2025)    Hunger

## 2025-07-21 NOTE — CONSULTS
Infectious Disease Consult Note    Assessment / Plan:      65-year-old male with left lower extremity diabetic foot infection and osteomyelitis pending TMA.    Seems to be doing okay off of antibiotics.  Given impending surgery tomorrow, okay to hold antibiotic therapy pending OR and then to start daptomycin and Zosyn postoperatively pending OR cultures and pathology.    Blood glucose control, defer to primary.    Ultrasound duplex with VINCE studies to assess vascular flow to left lower extremity.       Reason for Consult: Osteomyelitis  Date of Consultation: July 21, 2025  Date of Admission: 7/21/2025  Referring Physician: Dr. Zhu    HPI:    Delightful 65-year-old male with DM 2, hypertension, retired fisherman, aortic bioprosthetic valve replacement due to severe aortic stenosis, transferred from [facility] for worsening of left lower extremity wound.  Has left-sided foot wound that he has been dealing with for years for which she was seeing wound care outpatient and performing local I&D's, was dissatisfied with this course of care as wound became larger instead of smaller and saw Dr. Gan outpatient who placed foot in a boot to offload pressure for which wound had initially improved significantly although it had recurred in terms of erythema, swelling, drainage and seen locally at [facility] for which she was transferred to [facility] for further evaluation.  Found to have fever to 102.7 and CT scan with destructive changes for toes distally 1 through 3 and seen by podiatry pending [facility] tomorrow.  He was given 1 dose of clindamycin and 1 dose of Zosyn and currently afebrile and off of antibiotics.    Recalls being treated with vancomycin and ceftriaxone for which resulted in blistering erythematous rash and concern for \"red man\" syndrome.  It appears that he has tolerated Zosyn inpatient while admitted has been treated with cefazolin in 2023 without issue.    Past Medical

## 2025-07-21 NOTE — ED NOTES
This writer attempted to flush patients line before transfer, line did not flush and was removed,

## 2025-07-21 NOTE — PROGRESS NOTES
Per [facility] approved formulary policy.     SGLT2's are only formulary with the indication of CKD or CHF therefore:    Please note that the  Empagliflozin (Jardiance) is non-formulary with indications of type 2 diabetes and has been discontinued while inpatient.    Please contact the pharmacy with any questions or concerns.    Thank you,  Natalie Poe RPH, RPlucila/PharmD   7/21/2025 10:34 AM

## 2025-07-21 NOTE — PROGRESS NOTES
Pharmacist Review and Automatic Dose Adjustment of Prophylactic Enoxaparin    *Review reason for admission/hospital problem list*    The reviewing pharmacist has made an adjustment to the ordered enoxaparin dose or converted to UFH per the approved [facility] protocol and table as identified below.        Job Ruelas is a 65 y.o. male.     Recent Labs     07/20/25 1920   CREATININE 1.90*       Estimated Creatinine Clearance: 50 mL/min (A) (based on SCr of 1.9 mg/dL (H)).    Height:   Ht Readings from Last 1 Encounters:   07/20/25 1.829 m (6')     Weight:  Wt Readings from Last 1 Encounters:   07/20/25 119.5 kg (263 lb 6.4 oz)               Plan: Based upon the patient's weight (119.5 kg) and renal function, the ordered enoxaparin dose of 40 mg SQ daily has been changed to 30 mg SQ BID      Thank you,  ANDREW MCFARLANE, [facility] MS

## 2025-07-21 NOTE — CONSULTS
Nephrology Consult Note                                                                                Patient: Job Ruelas MRN: [id number]     YOB: 1960  Age: 65 y.o.  Sex: male      Consult requested by: Charlotte Zhu MD    Reason for Consultation - CKD3    Subjective:      Job Ruelas is a 65 y.o. male with a past medical history of diabetes mellitus type 2, hypertension, atrial fibrillation, peripheral arterial disease and diabetic neuropathy was admitted for evaluation of nonhealing ulcer on his left foot after he didn't respond to treatment n the outpatient setting .    CT imaging revealed osteomyelitis and tenosynovitis send he scheduled for left TMA on 7 x 22.  Nephrology consultation has been requested as he had elevated serum creatinine on admission labs     Patient denied having any active complaints like chills or abdominal pain or any lower urinary tract symptoms etc.  He does not follow up with Nephrology in the outpatient setting     Past Medical History:  Past Medical History:   Diagnosis Date    Blister of foot, left     diabetic blister    Diabetes (HCC)     Hypertension        Past Surgical History:  Past Surgical History:   Procedure Laterality Date    AMPUTATION      CARDIAC SURGERY         Family History:  Family History   Problem Relation Age of Onset    Cancer Mother     Cancer Sister     Cancer Brother        Social History:  Social History     Socioeconomic History    Marital status:      Spouse name: Not on file    Number of children: Not on file    Years of education: 11    Highest education level: Not on file   Occupational History    Not on file   Tobacco Use    Smoking status: Never    Smokeless tobacco: Never   Vaping Use    Vaping status: Never Used   Substance and Sexual Activity    Alcohol use: Not Currently    Drug use: Never    Sexual activity: Not Currently     Partners: Female   Other Topics Concern    Not on file   Social History Narrative    Not

## 2025-07-21 NOTE — PROGRESS NOTES
Spoke with Luis E at [facility] to set up ALS transportation to [facility] [address] for osteomyelitis with cardiac monitor and saline lock along with personal belonging. ETA of Ambulance is [phone]

## 2025-07-21 NOTE — ANESTHESIA PRE PROCEDURE
Department of Anesthesiology  Preprocedure Note       Name:  Job Ruelas   Age:  65 y.o.  :  1960                                          MRN:  [id number]         Date:  2025      Surgeon: Surgeon(s):  Maile Orourke DPM    Procedure: Procedure(s):  LEFT TRANSMETATARSAL AMPUTATION    Medications prior to admission:   Prior to Admission medications    Medication Sig Start Date End Date Taking? Authorizing Provider   atorvastatin (LIPITOR) 40 MG tablet TAKE ONE TABLET BY MOUTH DAILY AT 5PM 25  Yes Rayna Trimble APRN - NP   amiodarone (CORDARONE) 200 MG tablet TAKE ONE TABLET BY MOUTH DAILY AT 9AM 25  Yes Rayna Trimble APRN - NP   empagliflozin (JARDIANCE) 25 MG tablet TAKE ONE TABLET BY MOUTH DAILY AT 9AM 3/28/25  Yes Rayna Trimble APRN - NP   traZODone (DESYREL) 50 MG tablet TAKE ONE TABLET BY MOUTH DAILY AT 9PM NIGHTLY 3/28/25  Yes Rayna Trimble APRN - NP   oxyCODONE-acetaminophen (PERCOCET)  MG per tablet Take 1 tablet by mouth every 4 hours as needed for Pain.   Yes ProviderEula MD   gabapentin (NEURONTIN) 600 MG tablet Take 1 tablet by mouth 3 times daily.   Yes ProviderEula MD   apixaban (ELIQUIS) 5 MG TABS tablet Take 1 tablet by mouth 2 times daily 23  Yes Automatic Reconciliation, Ar   thiamine 100 MG tablet Take 1 tablet by mouth daily 23  Yes Automatic Reconciliation, Ar   sodium-potassium-mag sulfate (SUPREP) 17.5-3.13-1.6 GM/177ML SOLN solution Take 354 mLs by mouth See Admin Instructions 25   Kingston Garza MD   albuterol sulfate HFA (VENTOLIN HFA) 108 (90 Base) MCG/ACT inhaler Inhale 2 puffs into the lungs 4 times daily as needed for Wheezing 3/27/25   Nella Hooks MD   diclofenac sodium (VOLTAREN) 1 % GEL APPLY 2 G TOPICALLY 4 TIMES A DAY 24   Rayna Trimble APRN - NP   albuterol sulfate HFA (PROVENTIL HFA) 108 (90 Base) MCG/ACT inhaler Inhale 2 puffs into the lungs every 6 hours as needed for Wheezing

## 2025-07-21 NOTE — CARE COORDINATION
Care Management Initial Assessment     D/c plan:  - patient requesting SNF  - will likely need [facility] transport at d/c      RUR: 16%  Readmission? No  1st IM letter given? Yes - 7/21  1st  letter given: N/A    CM met with patient at the bedside to introduce self and CM role.     Home set up: w step-son, son's wife and 2 children, 2 level house, 1st fl set up, 3 koby (w rails)  ADLs: indep, would schedule [facility] transport to go to appts  DME: RW, rollator, walk in shower, grab bars in shower  PCP confirmed: yes - Rayna Trimble  Previous Home Health: no - patient reports that he has not been unsuccessful in finding an agency that services his area and excepts his insurance  Previous Skilled Nursing Facility: yes - x3, but names unknown  Previous Inpatient Rehab: no  Insurance confirmed: yes - [facility] dual MCR and [facility]  Pharmacy: [facility]  Emergency Contact: ольга/[place] - [phone]  Transportation: will likely need [facility] transport at d/c    During conversation regarding d/c planning, patient inquired about discharging to SNF. CM educated on d/c planning process and need for recommendations from care team. CM to pass along concerns and request to care team and follow up with patient once recommendations have been solidified. Patient acknowledged understanding and expressed appreciation.    CM will follow patient progress and assist as needed with MALISSA plan. At this time, all questions have been answered.       07/21/25 1523   Service Assessment   Patient Orientation Alert and Oriented;Person;Place;Situation;Self   Cognition Alert   History Provided By Patient   Primary Caregiver Self   Support Systems Children   Patient's Healthcare Decision Maker is: Legal Next of Kin   PCP Verified by CM Yes   Prior Functional Level Independent in ADLs/IADLs   Can patient return to prior living arrangement Unknown at present   Ability to make needs known: Good   Family able to assist with home care needs: Yes   Financial Resources

## 2025-07-21 NOTE — PROGRESS NOTES
Spiritual Health History and Assessment/Progress Note  [facility]    Initial Encounter,  ,  ,      Name: Job Ruelas MRN: [id number]    Age: 65 y.o.     Sex: male   Language: English   Jew: Alevism   Osteomyelitis (HCC)     Date: 7/21/2025            Total Time Calculated: 10 min              Spiritual Assessment began in [facility] 5S1 ORTHO [facility]            Encounter Overview/Reason: Initial Encounter  Service Provided For: Patient    Miryam, Belief, Meaning:   Patient identifies as spiritual, is connected with a miryam tradition or spiritual practice, and has beliefs or practices that help with coping during difficult times  Family/Friends No family/friends present      Importance and Influence:  Patient has spiritual/personal beliefs that influence decisions regarding their health  Family/Friends No family/friends present    Community:  Patient is connected with a spiritual community and feels well-supported. Support system includes: Miryam Community and Extended family  Family/Friends No family/friends present    Assessment and Plan of Care:     Patient Interventions include: Facilitated expression of thoughts and feelings and Engaged in theological reflection  Family/Friends Interventions include: No family/friends present    Patient Plan of Care: Spiritual Care available upon further referral  Family/Friends Plan of Care: No family/friends present    Electronically signed by Jace Pope,  Intern on 7/21/2025 at 11:30 AM

## 2025-07-21 NOTE — ED NOTES
TRANSFER - OUT REPORT:    Verbal report given to Amparo MARIO RN on Job Ruelas  being transferred to [place] [id number] for routine progression of patient care       Report consisted of patient's Situation, Background, Assessment and   Recommendations(SBAR).     Information from the following report(s) Nurse Handoff Report, ED Encounter Summary, ED SBAR, MAR, Recent Results, and Event Log was reviewed with the receiving nurse.    [place] Fall Assessment:    Presents to emergency department  because of falls (Syncope, seizure, or loss of consciousness): No  Age > 70: No  Altered Mental Status, Intoxication with alcohol or substance confusion (Disorientation, impaired judgment, poor safety awaremess, or inability to follow instructions): No  Impaired Mobility: Ambulates or transfers with assistive devices or assistance; Unable to ambulate or transer.: No  Nursing Judgement: No          Lines:   Peripheral IV 07/20/25 Left;Posterior Hand (Active)   Site Assessment Clean, dry & intact 07/20/25 1913   Line Status Blood return noted;Brisk blood return 07/20/25 1913   Line Care Connections checked and tightened 07/20/25 1913   Phlebitis Assessment No symptoms 07/20/25 1913   Infiltration Assessment 0 07/20/25 1913   Alcohol Cap Used Yes 07/20/25 1913   Dressing Status Clean, dry & intact 07/20/25 1913   Dressing Type Transparent 07/20/25 1913   Dressing Intervention New 07/20/25 1913        Opportunity for questions and clarification was provided.      Patient transported with:  Monitor

## 2025-07-22 ENCOUNTER — ANESTHESIA (OUTPATIENT)
Facility: HOSPITAL | Age: 65
End: 2025-07-22
Payer: MEDICARE

## 2025-07-22 LAB
ANION GAP SERPL CALC-SCNC: 6 MMOL/L (ref 2–12)
BUN SERPL-MCNC: 26 MG/DL (ref 6–20)
BUN/CREAT SERPL: 18 (ref 12–20)
CALCIUM SERPL-MCNC: 8.5 MG/DL (ref 8.5–10.1)
CHLORIDE SERPL-SCNC: 104 MMOL/L (ref 97–108)
CK SERPL-CCNC: 28 U/L (ref 39–308)
CO2 SERPL-SCNC: 26 MMOL/L (ref 21–32)
CREAT SERPL-MCNC: 1.47 MG/DL (ref 0.7–1.3)
CRP SERPL-MCNC: 12.5 MG/DL (ref 0–0.3)
EKG ATRIAL RATE: 95 BPM
EKG DIAGNOSIS: NORMAL
EKG P AXIS: 7 DEGREES
EKG P-R INTERVAL: 224 MS
EKG Q-T INTERVAL: 370 MS
EKG QRS DURATION: 102 MS
EKG QTC CALCULATION (BAZETT): 464 MS
EKG R AXIS: -49 DEGREES
EKG T AXIS: 51 DEGREES
EKG VENTRICULAR RATE: 95 BPM
ERYTHROCYTE [DISTWIDTH] IN BLOOD BY AUTOMATED COUNT: 15.1 % (ref 11.5–14.5)
ERYTHROCYTE [SEDIMENTATION RATE] IN BLOOD: 67 MM/HR (ref 0–20)
GLUCOSE BLD STRIP.AUTO-MCNC: 112 MG/DL (ref 65–117)
GLUCOSE BLD STRIP.AUTO-MCNC: 135 MG/DL (ref 65–117)
GLUCOSE BLD STRIP.AUTO-MCNC: 93 MG/DL (ref 65–117)
GLUCOSE SERPL-MCNC: 91 MG/DL (ref 65–100)
HCT VFR BLD AUTO: 40.2 % (ref 36.6–50.3)
HGB BLD-MCNC: 12.8 G/DL (ref 12.1–17)
MCH RBC QN AUTO: 30 PG (ref 26–34)
MCHC RBC AUTO-ENTMCNC: 31.8 G/DL (ref 30–36.5)
MCV RBC AUTO: 94.1 FL (ref 80–99)
NRBC # BLD: 0 K/UL (ref 0–0.01)
NRBC BLD-RTO: 0 PER 100 WBC
PLATELET # BLD AUTO: 173 K/UL (ref 150–400)
PMV BLD AUTO: 9.8 FL (ref 8.9–12.9)
POTASSIUM SERPL-SCNC: 4.4 MMOL/L (ref 3.5–5.1)
PROCALCITONIN SERPL-MCNC: 0.11 NG/ML
RBC # BLD AUTO: 4.27 M/UL (ref 4.1–5.7)
SERVICE CMNT-IMP: ABNORMAL
SERVICE CMNT-IMP: NORMAL
SERVICE CMNT-IMP: NORMAL
SODIUM SERPL-SCNC: 136 MMOL/L (ref 136–145)
VAS LEFT ATA DIST PSV: 152 CM/S
VAS LEFT ATA PROX PSV: 121.2 CM/S
VAS LEFT CFA PROX PSV: 91.5 CM/S
VAS LEFT PFA PROX PSV: 71.7 CM/S
VAS LEFT POP A DIST PSV: 104.5 CM/S
VAS LEFT POP A PROX PSV: 104.6 CM/S
VAS LEFT POP A PROX VEL RATIO: 0.95
VAS LEFT PTA DIST PSV: 113.6 CM/S
VAS LEFT PTA PROX PSV: 145.4 CM/S
VAS LEFT SFA DIST PSV: 110.3 CM/S
VAS LEFT SFA DIST VEL RATIO: 0.96
VAS LEFT SFA MID PSV: 114.7 CM/S
VAS LEFT SFA MID VEL RATIO: 0.95
VAS LEFT SFA PROX PSV: 121 CM/S
VAS LEFT SFA PROX VEL RATIO: 1.32
WBC # BLD AUTO: 7.7 K/UL (ref 4.1–11.1)

## 2025-07-22 PROCEDURE — 7100000000 HC PACU RECOVERY - FIRST 15 MIN: Performed by: PODIATRIST

## 2025-07-22 PROCEDURE — 2500000003 HC RX 250 WO HCPCS: Performed by: NURSE ANESTHETIST, CERTIFIED REGISTERED

## 2025-07-22 PROCEDURE — 87075 CULTR BACTERIA EXCEPT BLOOD: CPT

## 2025-07-22 PROCEDURE — 1100000000 HC RM PRIVATE

## 2025-07-22 PROCEDURE — 0Y6N0Z9 DETACHMENT AT LEFT FOOT, PARTIAL 1ST RAY, OPEN APPROACH: ICD-10-PCS

## 2025-07-22 PROCEDURE — 2580000003 HC RX 258: Performed by: ANESTHESIOLOGY

## 2025-07-22 PROCEDURE — 7100000001 HC PACU RECOVERY - ADDTL 15 MIN: Performed by: PODIATRIST

## 2025-07-22 PROCEDURE — 87186 SC STD MICRODIL/AGAR DIL: CPT

## 2025-07-22 PROCEDURE — 0Y6N0ZD DETACHMENT AT LEFT FOOT, PARTIAL 4TH RAY, OPEN APPROACH: ICD-10-PCS

## 2025-07-22 PROCEDURE — 82962 GLUCOSE BLOOD TEST: CPT

## 2025-07-22 PROCEDURE — 0Y6N0ZF DETACHMENT AT LEFT FOOT, PARTIAL 5TH RAY, OPEN APPROACH: ICD-10-PCS

## 2025-07-22 PROCEDURE — 87077 CULTURE AEROBIC IDENTIFY: CPT

## 2025-07-22 PROCEDURE — 3700000000 HC ANESTHESIA ATTENDED CARE: Performed by: PODIATRIST

## 2025-07-22 PROCEDURE — 2720000010 HC SURG SUPPLY STERILE: Performed by: PODIATRIST

## 2025-07-22 PROCEDURE — 6370000000 HC RX 637 (ALT 250 FOR IP): Performed by: NURSE PRACTITIONER

## 2025-07-22 PROCEDURE — 3600000002 HC SURGERY LEVEL 2 BASE: Performed by: PODIATRIST

## 2025-07-22 PROCEDURE — 2580000003 HC RX 258: Performed by: NURSE ANESTHETIST, CERTIFIED REGISTERED

## 2025-07-22 PROCEDURE — 2500000003 HC RX 250 WO HCPCS: Performed by: NURSE PRACTITIONER

## 2025-07-22 PROCEDURE — 85652 RBC SED RATE AUTOMATED: CPT

## 2025-07-22 PROCEDURE — 6360000002 HC RX W HCPCS: Performed by: NURSE PRACTITIONER

## 2025-07-22 PROCEDURE — 85027 COMPLETE CBC AUTOMATED: CPT

## 2025-07-22 PROCEDURE — 0Y6N0ZB DETACHMENT AT LEFT FOOT, PARTIAL 2ND RAY, OPEN APPROACH: ICD-10-PCS

## 2025-07-22 PROCEDURE — 88307 TISSUE EXAM BY PATHOLOGIST: CPT

## 2025-07-22 PROCEDURE — 88311 DECALCIFY TISSUE: CPT

## 2025-07-22 PROCEDURE — 87205 SMEAR GRAM STAIN: CPT

## 2025-07-22 PROCEDURE — 86140 C-REACTIVE PROTEIN: CPT

## 2025-07-22 PROCEDURE — 80048 BASIC METABOLIC PNL TOTAL CA: CPT

## 2025-07-22 PROCEDURE — 6360000002 HC RX W HCPCS: Performed by: NURSE ANESTHETIST, CERTIFIED REGISTERED

## 2025-07-22 PROCEDURE — 87185 SC STD ENZYME DETCJ PER NZM: CPT

## 2025-07-22 PROCEDURE — 82550 ASSAY OF CK (CPK): CPT

## 2025-07-22 PROCEDURE — 0Y6N0ZC DETACHMENT AT LEFT FOOT, PARTIAL 3RD RAY, OPEN APPROACH: ICD-10-PCS

## 2025-07-22 PROCEDURE — 87070 CULTURE OTHR SPECIMN AEROBIC: CPT

## 2025-07-22 PROCEDURE — 3600000012 HC SURGERY LEVEL 2 ADDTL 15MIN: Performed by: PODIATRIST

## 2025-07-22 PROCEDURE — 2709999900 HC NON-CHARGEABLE SUPPLY: Performed by: PODIATRIST

## 2025-07-22 PROCEDURE — 3700000001 HC ADD 15 MINUTES (ANESTHESIA): Performed by: PODIATRIST

## 2025-07-22 PROCEDURE — 6360000002 HC RX W HCPCS

## 2025-07-22 PROCEDURE — 6360000002 HC RX W HCPCS: Performed by: FAMILY MEDICINE

## 2025-07-22 PROCEDURE — 0LXW0ZZ TRANSFER LEFT FOOT TENDON, OPEN APPROACH: ICD-10-PCS

## 2025-07-22 PROCEDURE — 87076 CULTURE ANAEROBE IDENT EACH: CPT

## 2025-07-22 RX ORDER — EPHEDRINE SULFATE 50 MG/ML
INJECTION INTRAVENOUS
Status: DISCONTINUED | OUTPATIENT
Start: 2025-07-22 | End: 2025-07-22 | Stop reason: SDUPTHER

## 2025-07-22 RX ORDER — SODIUM CHLORIDE 0.9 % (FLUSH) 0.9 %
5-40 SYRINGE (ML) INJECTION EVERY 12 HOURS SCHEDULED
Status: DISCONTINUED | OUTPATIENT
Start: 2025-07-22 | End: 2025-07-22 | Stop reason: HOSPADM

## 2025-07-22 RX ORDER — SODIUM CHLORIDE 9 MG/ML
INJECTION, SOLUTION INTRAVENOUS PRN
Status: DISCONTINUED | OUTPATIENT
Start: 2025-07-22 | End: 2025-07-22 | Stop reason: HOSPADM

## 2025-07-22 RX ORDER — PROPOFOL 10 MG/ML
INJECTION, EMULSION INTRAVENOUS
Status: DISCONTINUED | OUTPATIENT
Start: 2025-07-22 | End: 2025-07-22 | Stop reason: SDUPTHER

## 2025-07-22 RX ORDER — CEFAZOLIN SODIUM 1 G/3ML
INJECTION, POWDER, FOR SOLUTION INTRAMUSCULAR; INTRAVENOUS
Status: DISCONTINUED | OUTPATIENT
Start: 2025-07-22 | End: 2025-07-22 | Stop reason: SDUPTHER

## 2025-07-22 RX ORDER — PHENYLEPHRINE HCL IN 0.9% NACL 0.4MG/10ML
SYRINGE (ML) INTRAVENOUS
Status: DISCONTINUED | OUTPATIENT
Start: 2025-07-22 | End: 2025-07-22 | Stop reason: SDUPTHER

## 2025-07-22 RX ORDER — SODIUM CHLORIDE, SODIUM LACTATE, POTASSIUM CHLORIDE, CALCIUM CHLORIDE 600; 310; 30; 20 MG/100ML; MG/100ML; MG/100ML; MG/100ML
INJECTION, SOLUTION INTRAVENOUS CONTINUOUS
Status: DISCONTINUED | OUTPATIENT
Start: 2025-07-22 | End: 2025-07-22 | Stop reason: HOSPADM

## 2025-07-22 RX ORDER — SODIUM CHLORIDE 0.9 % (FLUSH) 0.9 %
5-40 SYRINGE (ML) INJECTION PRN
Status: DISCONTINUED | OUTPATIENT
Start: 2025-07-22 | End: 2025-07-22 | Stop reason: HOSPADM

## 2025-07-22 RX ORDER — FENTANYL CITRATE 50 UG/ML
25 INJECTION, SOLUTION INTRAMUSCULAR; INTRAVENOUS EVERY 5 MIN PRN
Status: DISCONTINUED | OUTPATIENT
Start: 2025-07-22 | End: 2025-07-22 | Stop reason: HOSPADM

## 2025-07-22 RX ORDER — LABETALOL HYDROCHLORIDE 5 MG/ML
10 INJECTION, SOLUTION INTRAVENOUS
Status: DISCONTINUED | OUTPATIENT
Start: 2025-07-22 | End: 2025-07-22 | Stop reason: HOSPADM

## 2025-07-22 RX ORDER — SODIUM CHLORIDE 9 MG/ML
INJECTION, SOLUTION INTRAVENOUS CONTINUOUS
Status: DISCONTINUED | OUTPATIENT
Start: 2025-07-22 | End: 2025-07-22 | Stop reason: HOSPADM

## 2025-07-22 RX ORDER — DEXMEDETOMIDINE HYDROCHLORIDE 100 UG/ML
INJECTION, SOLUTION INTRAVENOUS
Status: DISCONTINUED | OUTPATIENT
Start: 2025-07-22 | End: 2025-07-22 | Stop reason: SDUPTHER

## 2025-07-22 RX ORDER — HYDROMORPHONE HYDROCHLORIDE 1 MG/ML
0.5 INJECTION, SOLUTION INTRAMUSCULAR; INTRAVENOUS; SUBCUTANEOUS EVERY 5 MIN PRN
Status: DISCONTINUED | OUTPATIENT
Start: 2025-07-22 | End: 2025-07-22 | Stop reason: HOSPADM

## 2025-07-22 RX ORDER — ONDANSETRON 2 MG/ML
4 INJECTION INTRAMUSCULAR; INTRAVENOUS
Status: DISCONTINUED | OUTPATIENT
Start: 2025-07-22 | End: 2025-07-22 | Stop reason: HOSPADM

## 2025-07-22 RX ORDER — PROCHLORPERAZINE EDISYLATE 5 MG/ML
5 INJECTION INTRAMUSCULAR; INTRAVENOUS
Status: DISCONTINUED | OUTPATIENT
Start: 2025-07-22 | End: 2025-07-22 | Stop reason: HOSPADM

## 2025-07-22 RX ORDER — MIDAZOLAM HYDROCHLORIDE 1 MG/ML
INJECTION, SOLUTION INTRAMUSCULAR; INTRAVENOUS
Status: DISCONTINUED | OUTPATIENT
Start: 2025-07-22 | End: 2025-07-22 | Stop reason: SDUPTHER

## 2025-07-22 RX ORDER — SODIUM CHLORIDE, SODIUM LACTATE, POTASSIUM CHLORIDE, CALCIUM CHLORIDE 600; 310; 30; 20 MG/100ML; MG/100ML; MG/100ML; MG/100ML
INJECTION, SOLUTION INTRAVENOUS
Status: DISCONTINUED | OUTPATIENT
Start: 2025-07-22 | End: 2025-07-22 | Stop reason: SDUPTHER

## 2025-07-22 RX ORDER — BUPIVACAINE HYDROCHLORIDE 5 MG/ML
INJECTION, SOLUTION EPIDURAL; INTRACAUDAL; PERINEURAL PRN
Status: DISCONTINUED | OUTPATIENT
Start: 2025-07-22 | End: 2025-07-22 | Stop reason: HOSPADM

## 2025-07-22 RX ADMIN — SODIUM CHLORIDE, SODIUM LACTATE, POTASSIUM CHLORIDE, AND CALCIUM CHLORIDE: .6; .31; .03; .02 INJECTION, SOLUTION INTRAVENOUS at 09:35

## 2025-07-22 RX ADMIN — Medication 200 MCG: at 11:07

## 2025-07-22 RX ADMIN — ENOXAPARIN SODIUM 30 MG: 100 INJECTION SUBCUTANEOUS at 21:05

## 2025-07-22 RX ADMIN — GABAPENTIN 600 MG: 600 TABLET, FILM COATED ORAL at 21:05

## 2025-07-22 RX ADMIN — MIDAZOLAM 2 MG: 1 INJECTION INTRAMUSCULAR; INTRAVENOUS at 10:08

## 2025-07-22 RX ADMIN — TRAZODONE HYDROCHLORIDE 50 MG: 50 TABLET ORAL at 21:05

## 2025-07-22 RX ADMIN — SODIUM CHLORIDE, PRESERVATIVE FREE 10 ML: 5 INJECTION INTRAVENOUS at 21:07

## 2025-07-22 RX ADMIN — DEXMEDETOMIDINE 20 MCG: 100 INJECTION, SOLUTION, CONCENTRATE INTRAVENOUS at 10:11

## 2025-07-22 RX ADMIN — ATORVASTATIN CALCIUM 40 MG: 40 TABLET, FILM COATED ORAL at 21:05

## 2025-07-22 RX ADMIN — EPHEDRINE SULFATE 10 MG: 50 INJECTION INTRAVENOUS at 10:52

## 2025-07-22 RX ADMIN — EPHEDRINE SULFATE 10 MG: 50 INJECTION INTRAVENOUS at 10:34

## 2025-07-22 RX ADMIN — PROPOFOL 50 MG: 10 INJECTION, EMULSION INTRAVENOUS at 10:11

## 2025-07-22 RX ADMIN — CEFAZOLIN 2 G: 330 INJECTION, POWDER, FOR SOLUTION INTRAMUSCULAR; INTRAVENOUS at 10:39

## 2025-07-22 RX ADMIN — OXYCODONE AND ACETAMINOPHEN 1 TABLET: 10; 325 TABLET ORAL at 06:10

## 2025-07-22 RX ADMIN — ONDANSETRON 4 MG: 2 INJECTION, SOLUTION INTRAMUSCULAR; INTRAVENOUS at 20:01

## 2025-07-22 RX ADMIN — Medication 200 MCG: at 10:32

## 2025-07-22 RX ADMIN — OXYCODONE AND ACETAMINOPHEN 1 TABLET: 10; 325 TABLET ORAL at 14:28

## 2025-07-22 RX ADMIN — GABAPENTIN 600 MG: 600 TABLET, FILM COATED ORAL at 14:26

## 2025-07-22 RX ADMIN — EPHEDRINE SULFATE 10 MG: 50 INJECTION INTRAVENOUS at 11:05

## 2025-07-22 RX ADMIN — SODIUM CHLORIDE, POTASSIUM CHLORIDE, SODIUM LACTATE AND CALCIUM CHLORIDE: 600; 310; 30; 20 INJECTION, SOLUTION INTRAVENOUS at 10:08

## 2025-07-22 RX ADMIN — OXYCODONE AND ACETAMINOPHEN 1 TABLET: 10; 325 TABLET ORAL at 21:05

## 2025-07-22 RX ADMIN — Medication 200 MCG: at 10:50

## 2025-07-22 ASSESSMENT — PAIN DESCRIPTION - LOCATION
LOCATION: FOOT
LOCATION: SHOULDER
LOCATION: FOOT

## 2025-07-22 ASSESSMENT — PAIN SCALES - GENERAL
PAINLEVEL_OUTOF10: 3
PAINLEVEL_OUTOF10: 7
PAINLEVEL_OUTOF10: 6
PAINLEVEL_OUTOF10: 8
PAINLEVEL_OUTOF10: 7
PAINLEVEL_OUTOF10: 7
PAINLEVEL_OUTOF10: 8
PAINLEVEL_OUTOF10: 8
PAINLEVEL_OUTOF10: 6
PAINLEVEL_OUTOF10: 7

## 2025-07-22 ASSESSMENT — PAIN DESCRIPTION - DESCRIPTORS
DESCRIPTORS: ACHING

## 2025-07-22 ASSESSMENT — PAIN DESCRIPTION - ORIENTATION
ORIENTATION: LEFT
ORIENTATION: RIGHT
ORIENTATION: LEFT

## 2025-07-22 ASSESSMENT — PAIN - FUNCTIONAL ASSESSMENT
PAIN_FUNCTIONAL_ASSESSMENT: ACTIVITIES ARE NOT PREVENTED
PAIN_FUNCTIONAL_ASSESSMENT: NONE - DENIES PAIN
PAIN_FUNCTIONAL_ASSESSMENT: NONE - DENIES PAIN

## 2025-07-22 NOTE — PROGRESS NOTES
Nephrology Progress Note     Patient in OR at the time of visit , will return to see him   Reviewed Vitals , BMP from this am.

## 2025-07-22 NOTE — OP NOTE
Operative Note      Patient: Job Ruelas  YOB: 1960  MRN: [id number]    Date of Procedure: 7/22/2025    Pre-Op Diagnosis Codes:      * Infection of bone (HCC) [M86.9]    Post-Op Diagnosis: Same       Procedure(s):  LEFT TRANSMETATARSAL AMPUTATION WITH TRANSFER EXTENSOR HALLUX TENDON    Surgeon(s):  Maile Orourke DPM Wentz, Jennifer, DPM    Assistant:   * No surgical staff found *    Anesthesia: Monitor Anesthesia Care    Estimated Blood Loss (mL): 300     Complications: None    Specimens:   ID Type Source Tests Collected by Time Destination   1 : Left forefoot Tissue Tissue SURGICAL PATHOLOGY Rebecca Siddiqui DPM 7/22/2025 1050    A : Left foot deep wound Swab Foot CULTURE, ANAEROBIC, CULTURE, WOUND (WITH GRAM STAIN) Rebecca Siddiqui DPM 7/22/2025 1040        Implants:  * No implants in log *      Drains: * No LDAs found *    Findings:  Present At Time Of Surgery (PATOS) (choose all levels that have infection present):  - Organ Space infection (below fascia) present as evidenced by abscess, pus, purulent fluid, and fluid consistent with infection    Detailed Description of Procedure:   Discussion took place regarding risks and benefits of surgical and non-surgical interventions as well as alternative treatments. He elected for TMA for source control and hopefully prevent further break down.  Patient wished to pursue surgery.  Risks and benefits were discussed at length including but not limited to risk of infection, nerve damage, wound complications, loss of correction/new deformity, swelling, blood clot formation, persistent infection, persistent pain, limb loss,  and need for further surgery.  All questions were answered and the patient wished to proceed.       SURGERY PROCEDURE DETAIL: Patient was identified in the preoperative hold area.  Operative site was confirmed with the patient and marked with a marking pen.  Consent was reviewed with the patient and updated appropriately.  History and  physical were updated as well. Patient was then transferred to the OR suite by nursing staff and kept on the stretcher and  all bony prominences were well padded.  No tourniquet was placed.  A bump was placed under the ipsilateral hip to internally rotate the leg.  Patient received a local block before formal prep.  The operative extremity was then prepped and draped in the usual sterile fashion.  After satisfactory identification was established by the staff of record, time out was held to confirm the correct operative site and room preparation.  Antibiotics were given.       Attention was directed to left foot.  30 cc 0.5% Marcaine w/o was placed as an ankle block. Surgical marker was used to draw out a fish mouth incision. A 15 blade was used to perform an incision and drainage through the skin and electrocautery was used to deepen dissection down to bone. A deep wound culture was obtained. The dorsal and plantar flaps were then reflexed proximally. The metatarsal were then exposed and osteotomized at the mid diaphyseal level. The forefoot was then removed and placed on the back table to be sent to pathology.  All necrotic and purulent tissue was removed. Next the EHL were dissected and isolated from the dorsal flap. The EHL tendon was then lassoed  around the 3rd to assist with dorsiflexion and appose achilles pull. The tendons were secured with 2-0 Monocryl.     Area was copiously irrigated with saline and betadine solution. Hemostats was obtained with cauterization. Incision was closed with 2-0 monocryl stratfix and 2-0 nylon. Area was dressed with Xeroform, gauze, abd, cast padding, Kerlix and ace was placed.  Patient tolerated procedure well was taken to the postanesthesia care in stable edition.  All counts are correct. No complications were identified.       Electronically signed by Rebecca Siddiqui DPM on 7/22/2025 at 11:53 AM

## 2025-07-22 NOTE — PROGRESS NOTES
[facility] Adult  Hospitalist Group                                                                                          Hospitalist Progress Note  Migdalia ROSA Roberts NP  Office Phone: [phone]        Date of Service:  2025  NAME:  Job Ruelas  :  1960  MRN:  [id number]       Admission Summary:   Job Ruelas is a 65 y.o. male with a past medical history significant for diabetes, hypertension, A-fib on OAC therapy, diabetic neuropathy and a nonhealing left foot wound who was transferred from [facility] with osteomyelitis.  Patient reports he has been in a cast for 3 months up until 2 weeks ago at which time the cast was removed and he was told to use a walking boot. Patient has been followed by Dr Gan at [facility] wound care.  He noticed increased swelling and pain in his left foot and that it \"started turning colors yesterday\".  He soaked the foot in Epsom salts but the swelling and pain continued and he developed some nausea and vomiting as well as shortness of breath so he went to the hospital.  The nausea and vomiting have abated as well as the shortness of breath however on imaging he was found to have osteomyelitis of the second metatarsal head and neck and septic arthritis of the third MTP articulation with dorsal dislocation of the fifth phalangeal base and soft tissues gas extending within the third toe distally indicative of septic tenosynovitis.  The patient was transferred to [facility] for evaluation by podiatry.  He has had previous amputation of the 1st and 2nd toes of the L foot 5 years ago at [facility].     He reports his blood sugars run generally between 130 and 160.  He is followed by Dr. Loyd at [facility] for cardiology.  Reports he needs a right shoulder replacement but the left foot issue has prevented that from occurring.  He receives Percocet from a pain management specialist.  He lives with his son and his son's girlfriend and is

## 2025-07-22 NOTE — ED NOTES
1000  7/22/2025  Daughter called to confirm patients location because she could not find him. Double identifiers verified, chart accessed and location provided

## 2025-07-22 NOTE — ANESTHESIA POSTPROCEDURE EVALUATION
Department of Anesthesiology  Postprocedure Note    Patient: Job Ruelas  MRN: [id number]  YOB: 1960  Date of evaluation: 7/22/2025    Procedure Summary       Date: 07/22/25 Room / Location: [facility] MAIN OR [address] [place] [address] MAIN OR    Anesthesia Start: 1008 Anesthesia Stop: 1137    Procedure: LEFT TRANSMETATARSAL AMPUTATION WITH TRANSFER EXTENSOR HALLUX TENDON (Left: Foot) Diagnosis:       Infection of bone (HCC)      (Infection of bone (HCC) [M86.9])    Providers: Maile Orourke DPM Responsible Provider: Jordan Bustamante MD    Anesthesia Type: MAC ASA Status: 3            Anesthesia Type: No value filed.    Shaquille Phase I:      Shaquille Phase II:      Anesthesia Post Evaluation    Patient location during evaluation: PACU  Patient participation: complete - patient participated  Level of consciousness: awake  Airway patency: patent  Nausea & Vomiting: no nausea  Cardiovascular status: hemodynamically stable  Respiratory status: acceptable  Hydration status: stable  Pain management: adequate    No notable events documented.

## 2025-07-22 NOTE — PROGRESS NOTES
TRANSFER - OUT REPORT:    Verbal report given to ADAIR King on Job Ruelas  being transferred to  for routine post-op       Report consisted of patient's Situation, Background, Assessment and   Recommendations(SBAR).     Information from the following report(s) Adult Overview, Surgery Report, Intake/Output, and MAR was reviewed with the receiving nurse.           Lines:   Peripheral IV 07/21/25 Left;Proximal;Anterior Forearm (Active)   Site Assessment Clean, dry & intact 07/22/25 1140   Line Status Flushed;Infusing 07/22/25 1140   Line Care Cap changed;Connections checked and tightened 07/22/25 1140   Phlebitis Assessment No symptoms 07/22/25 1140   Infiltration Assessment 0 07/22/25 1140   Alcohol Cap Used Yes 07/22/25 1140   Dressing Status Clean, dry & intact 07/22/25 1140   Dressing Type Transparent 07/22/25 1140        Opportunity for questions and clarification was provided.      Patient transported with:  Tech

## 2025-07-23 LAB
ANION GAP SERPL CALC-SCNC: 8 MMOL/L (ref 2–12)
BUN SERPL-MCNC: 22 MG/DL (ref 6–20)
BUN/CREAT SERPL: 18 (ref 12–20)
CALCIUM SERPL-MCNC: 8.2 MG/DL (ref 8.5–10.1)
CHLORIDE SERPL-SCNC: 102 MMOL/L (ref 97–108)
CO2 SERPL-SCNC: 24 MMOL/L (ref 21–32)
CREAT SERPL-MCNC: 1.25 MG/DL (ref 0.7–1.3)
CRP SERPL-MCNC: 10.8 MG/DL (ref 0–0.3)
ERYTHROCYTE [SEDIMENTATION RATE] IN BLOOD: 107 MM/HR (ref 0–20)
GLUCOSE BLD STRIP.AUTO-MCNC: 133 MG/DL (ref 65–117)
GLUCOSE BLD STRIP.AUTO-MCNC: 176 MG/DL (ref 65–117)
GLUCOSE BLD STRIP.AUTO-MCNC: 196 MG/DL (ref 65–117)
GLUCOSE SERPL-MCNC: 119 MG/DL (ref 65–100)
HCT VFR BLD AUTO: 37.2 % (ref 36.6–50.3)
HGB BLD-MCNC: 11.6 G/DL (ref 12.1–17)
POTASSIUM SERPL-SCNC: 4.4 MMOL/L (ref 3.5–5.1)
SERVICE CMNT-IMP: ABNORMAL
SODIUM SERPL-SCNC: 134 MMOL/L (ref 136–145)

## 2025-07-23 PROCEDURE — G0545 PR INHERENT VISIT TO INPT: HCPCS | Performed by: INTERNAL MEDICINE

## 2025-07-23 PROCEDURE — 2580000003 HC RX 258: Performed by: INTERNAL MEDICINE

## 2025-07-23 PROCEDURE — 80048 BASIC METABOLIC PNL TOTAL CA: CPT

## 2025-07-23 PROCEDURE — 85014 HEMATOCRIT: CPT

## 2025-07-23 PROCEDURE — 2500000003 HC RX 250 WO HCPCS: Performed by: NURSE PRACTITIONER

## 2025-07-23 PROCEDURE — 6370000000 HC RX 637 (ALT 250 FOR IP): Performed by: NURSE PRACTITIONER

## 2025-07-23 PROCEDURE — 97161 PT EVAL LOW COMPLEX 20 MIN: CPT

## 2025-07-23 PROCEDURE — 97530 THERAPEUTIC ACTIVITIES: CPT

## 2025-07-23 PROCEDURE — 97165 OT EVAL LOW COMPLEX 30 MIN: CPT

## 2025-07-23 PROCEDURE — 85652 RBC SED RATE AUTOMATED: CPT

## 2025-07-23 PROCEDURE — 99232 SBSQ HOSP IP/OBS MODERATE 35: CPT | Performed by: INTERNAL MEDICINE

## 2025-07-23 PROCEDURE — 85018 HEMOGLOBIN: CPT

## 2025-07-23 PROCEDURE — 1100000000 HC RM PRIVATE

## 2025-07-23 PROCEDURE — 86140 C-REACTIVE PROTEIN: CPT

## 2025-07-23 PROCEDURE — 6360000002 HC RX W HCPCS: Performed by: INTERNAL MEDICINE

## 2025-07-23 PROCEDURE — 82962 GLUCOSE BLOOD TEST: CPT

## 2025-07-23 PROCEDURE — 6370000000 HC RX 637 (ALT 250 FOR IP): Performed by: INTERNAL MEDICINE

## 2025-07-23 RX ORDER — IODINE/SODIUM IODIDE 2 %
TINCTURE TOPICAL 2 TIMES DAILY
Status: DISCONTINUED | OUTPATIENT
Start: 2025-07-23 | End: 2025-07-26 | Stop reason: HOSPADM

## 2025-07-23 RX ADMIN — FERRIC OXIDE RED: 8; 8 LOTION TOPICAL at 21:09

## 2025-07-23 RX ADMIN — SODIUM CHLORIDE, PRESERVATIVE FREE 10 ML: 5 INJECTION INTRAVENOUS at 08:22

## 2025-07-23 RX ADMIN — GABAPENTIN 600 MG: 600 TABLET, FILM COATED ORAL at 14:12

## 2025-07-23 RX ADMIN — APIXABAN 5 MG: 5 TABLET, FILM COATED ORAL at 21:05

## 2025-07-23 RX ADMIN — OXYCODONE AND ACETAMINOPHEN 1 TABLET: 10; 325 TABLET ORAL at 02:12

## 2025-07-23 RX ADMIN — FERRIC OXIDE RED: 8; 8 LOTION TOPICAL at 11:01

## 2025-07-23 RX ADMIN — PIPERACILLIN AND TAZOBACTAM 3375 MG: 3; .375 INJECTION, POWDER, FOR SOLUTION INTRAVENOUS at 14:36

## 2025-07-23 RX ADMIN — AMIODARONE HYDROCHLORIDE 200 MG: 200 TABLET ORAL at 08:22

## 2025-07-23 RX ADMIN — ATORVASTATIN CALCIUM 40 MG: 40 TABLET, FILM COATED ORAL at 21:06

## 2025-07-23 RX ADMIN — GABAPENTIN 600 MG: 600 TABLET, FILM COATED ORAL at 08:22

## 2025-07-23 RX ADMIN — GABAPENTIN 600 MG: 600 TABLET, FILM COATED ORAL at 21:06

## 2025-07-23 RX ADMIN — APIXABAN 5 MG: 5 TABLET, FILM COATED ORAL at 08:22

## 2025-07-23 RX ADMIN — SODIUM CHLORIDE, PRESERVATIVE FREE 10 ML: 5 INJECTION INTRAVENOUS at 21:14

## 2025-07-23 RX ADMIN — OXYCODONE AND ACETAMINOPHEN 1 TABLET: 10; 325 TABLET ORAL at 21:06

## 2025-07-23 RX ADMIN — PIPERACILLIN AND TAZOBACTAM 4500 MG: 4; .5 INJECTION, POWDER, LYOPHILIZED, FOR SOLUTION INTRAVENOUS at 09:42

## 2025-07-23 RX ADMIN — TRAZODONE HYDROCHLORIDE 50 MG: 50 TABLET ORAL at 21:06

## 2025-07-23 ASSESSMENT — PAIN DESCRIPTION - DESCRIPTORS
DESCRIPTORS: ACHING

## 2025-07-23 ASSESSMENT — PAIN DESCRIPTION - ORIENTATION
ORIENTATION: LEFT

## 2025-07-23 ASSESSMENT — PAIN DESCRIPTION - LOCATION
LOCATION: FOOT

## 2025-07-23 ASSESSMENT — PAIN SCALES - GENERAL
PAINLEVEL_OUTOF10: 7
PAINLEVEL_OUTOF10: 5
PAINLEVEL_OUTOF10: 5
PAINLEVEL_OUTOF10: 4
PAINLEVEL_OUTOF10: 4

## 2025-07-23 NOTE — PROGRESS NOTES
[facility] Adult  Hospitalist Group                                                                                          Hospitalist Progress Note  Migdalia ROSA Roberts NP  Office Phone: [phone]        Date of Service:  2025  NAME:  Job Ruelas  :  1960  MRN:  [id number]       Admission Summary:   Job Ruelas is a 65 y.o. male with a past medical history significant for diabetes, hypertension, A-fib on OAC therapy, diabetic neuropathy and a nonhealing left foot wound who was transferred from [facility] with osteomyelitis.  Patient reports he has been in a cast for 3 months up until 2 weeks ago at which time the cast was removed and he was told to use a walking boot. Patient has been followed by Dr Gan at [facility] wound care.  He noticed increased swelling and pain in his left foot and that it \"started turning colors yesterday\".  He soaked the foot in Epsom salts but the swelling and pain continued and he developed some nausea and vomiting as well as shortness of breath so he went to the hospital.  The nausea and vomiting have abated as well as the shortness of breath however on imaging he was found to have osteomyelitis of the second metatarsal head and neck and septic arthritis of the third MTP articulation with dorsal dislocation of the fifth phalangeal base and soft tissues gas extending within the third toe distally indicative of septic tenosynovitis.  The patient was transferred to [facility] for evaluation by podiatry.  He has had previous amputation of the 1st and 2nd toes of the L foot 5 years ago at [facility].     He reports his blood sugars run generally between 130 and 160.  He is followed by Dr. Loyd at [facility] for cardiology.  Reports he needs a right shoulder replacement but the left foot issue has prevented that from occurring.  He receives Percocet from a pain management specialist.  He lives with his son and his son's girlfriend and is  medicine section of [facility]      I have personally and independently reviewed all pertinent labs, diagnostic studies, imaging, and have provided independent interpretation of the same.     Labs:     Recent Labs     07/20/25 1920 07/22/25 0448 07/23/25 0336   WBC 10.6 7.7  --    HGB 12.8 12.8 11.6*   HCT 38.7 40.2 37.2    173  --      Recent Labs     07/20/25 1920 07/22/25 0448 07/23/25 0336   * 136 134*   K 4.0 4.4 4.4   CL 99 104 102   CO2 26 26 24   BUN 37* 26* 22*     Recent Labs     07/20/25 1920   ALT 13   GLOB 4.4*     No results for input(s): \"INR\", \"APTT\" in the last 72 hours.    Invalid input(s): \"PTP\"   No results for input(s): \"TIBC\" in the last 72 hours.    Invalid input(s): \"FE\", \"PSAT\", \"FERR\"   No results found for: \"RBCF\"   No results for input(s): \"PH\", \"PCO2\", \"PO2\" in the last 72 hours.  No results for input(s): \"CPK\" in the last 72 hours.    Invalid input(s): \"CPKMB\", \"CKNDX\", \"TROIQ\"  Lab Results   Component Value Date/Time    CHOL 113 08/05/2024 01:54 PM    HDL 44 08/05/2024 01:54 PM    LDL 52.4 08/05/2024 01:54 PM    LDL 39.6 01/09/2023 12:21 PM     No results found for: \"GLUCPOC\"  [unfilled]    Notes reviewed from all clinical/nonclinical/nursing services involved in patient's clinical care. Care coordination discussions were held with appropriate clinical/nonclinical/ nursing providers based on care coordination needs.         Patients current active Medications were reviewed, considered, added and adjusted based on the clinical condition today.      Home Medications were reconciled to the best of my ability given all available resources at the time of admission. Route is PO if not otherwise noted.      Admission Status:[id number]:::1}      Medications Reviewed:     Current Facility-Administered Medications   Medication Dose Route Frequency    sodium chloride flush 0.9 % injection 5-40 mL  5-40 mL IntraVENous 2 times per day    sodium chloride flush 0.9 % injection 5-40 mL  5-40

## 2025-07-23 NOTE — CARE COORDINATION
Transition of Care Plan    D/c plan:  - SNF (referrals pending, will need auth)  - will likely need REYNA transport at d/c       RUR: 15%  Prior Level of Functioning: indep, would schedule REY[facility] transport to go to [facility]   Disposition: SNF (pending choice and accepting facility)  TERESA: 1-2 days    If SNF or IPR: Date FOC offered: 7/23  Date FOC received: 7/23 - [facility], [facility], [facility], and [facility]  Accepting facility: [facility]  Date authorization started with reference number:   Date authorization received and expires:   Follow up appointments: per physician recommendation  DME needed: SNF to order ongoing DME needs  Transportation at discharge: will likely need [facility] transport at d/c   IM/IMM Medicare/ letter given: 1st IM /21  Is patient a [place] and connected with [facility]? N/A   If yes, was [place] transfer form completed and [facility] notified?   Caregiver Contact: ольга/[place] - [phone]  Discharge Caregiver contacted prior to discharge?   Care Conference needed? N/A  Barriers to discharge: SNF bed and auth    SNF FOC given - CM met with patient at the bedside to follow up on care and provide SNF choice list. Patient to review list and let CM know 3-4 choices. CM to follow up later to obtain choice.    3:00PM - CM met with patient at the bedside to obtain choice - [facility], [facility], [facility], and [facility] are choice. CM sent referrals via [employer], pending acceptance(s).    At this time, all questions have been answered.    BRAYAN Ocampo  Care Management

## 2025-07-23 NOTE — PROGRESS NOTES
Infectious Disease Progress  Note    Assessment / Plan:      65-year-old male with left lower extremity diabetic foot infection and osteomyelitis pending TMA.    Started Zosyn.    Follow OR cultures and pathology and will adjust antibiotics accordingly.    Blood glucose control, defer to primary.         Reason for Consult: Osteomyelitis  Date of Consultation: July 23, 2025  Date of Admission: 7/21/2025  Referring Physician: Dr. Zhu    HPI:    Delightful 65-year-old male with DM 2, hypertension, retired fisherman, aortic bioprosthetic valve replacement due to severe aortic stenosis, transferred from [facility] for worsening of left lower extremity wound.  Has left-sided foot wound that he has been dealing with for years for which she was seeing wound care outpatient and performing local I&D's, was dissatisfied with this course of care as wound became larger instead of smaller and saw Dr. Gan outpatient who placed foot in a boot to offload pressure for which wound had initially improved significantly although it had recurred in terms of erythema, swelling, drainage and seen locally at [facility] for which she was transferred to [facility] for further evaluation.  Found to have fever to 102.7 and CT scan with destructive changes for toes distally 1 through 3 and seen by podiatry pending TMA tomorrow.  He was given 1 dose of clindamycin and 1 dose of Zosyn and currently afebrile and off of antibiotics.    Recalls being treated with vancomycin and ceftriaxone for which resulted in blistering erythematous rash and concern for \"red man\" syndrome.  It appears that he has tolerated Zosyn inpatient while admitted has been treated with cefazolin in 2023 without issue.    Interval events:    Afebrile, underwent OR yesterday for source control, having some pain postop    Past Medical History:  Past Medical History:   Diagnosis Date    Blister of foot, left     diabetic blister    Diabetes  1530 98.3 °F (36.8 °C) 77 18 120/78 99 %   07/22/25 1435 97.9 °F (36.6 °C) 79 18 112/75 100 %   07/22/25 1300 97.5 °F (36.4 °C) 76 14 (!) 105/46 --   07/22/25 1245 98.1 °F (36.7 °C) 79 12 (!) 100/54 93 %   07/22/25 1230 -- 76 14 (!) 105/48 96 %     GEN: No distress  HEENT: Normocephalic, atraumatic  CV: Normal rate  Lungs: Clear to auscultation bilaterally  Abdomen: soft, non distended  Genitourinary: no rubin  Extremities: Left lower extremity dressed postop  Neuro: Alert, oriented to time, place and situation, moves all extremities to commands, verbal   Skin: Significant edema, drainage, erythema to distal left foot pending OR tomorrow  Psych: good affect, good eye contact, non tearful     Central Line:        Labs:   Recent Results (from the past 24 hours)   POCT Glucose    Collection Time: 07/22/25  8:32 PM   Result Value Ref Range    POC Glucose 135 (H) 65 - 117 mg/dL    Performed by: TENZIN Dave    C-Reactive Protein    Collection Time: 07/23/25  3:36 AM   Result Value Ref Range    CRP 10.80 (H) 0.00 - 0.30 mg/dL   Sedimentation Rate    Collection Time: 07/23/25  3:36 AM   Result Value Ref Range    Sed Rate, Automated 107 (H) 0 - 20 mm/hr   Basic Metabolic Panel    Collection Time: 07/23/25  3:36 AM   Result Value Ref Range    Sodium 134 (L) 136 - 145 mmol/L    Potassium 4.4 3.5 - 5.1 mmol/L    Chloride 102 97 - 108 mmol/L    CO2 24 21 - 32 mmol/L    Anion Gap 8 2 - 12 mmol/L    Glucose 119 (H) 65 - 100 mg/dL    BUN 22 (H) 6 - 20 MG/DL    Creatinine 1.25 0.70 - 1.30 MG/DL    BUN/Creatinine Ratio 18 12 - 20      Est, Glom Filt Rate 63 >60 ml/min/1.73m2    Calcium 8.2 (L) 8.5 - 10.1 MG/DL   Hemoglobin and Hematocrit    Collection Time: 07/23/25  3:36 AM   Result Value Ref Range    Hemoglobin 11.6 (L) 12.1 - 17.0 g/dL    Hematocrit 37.2 36.6 - 50.3 %   POCT Glucose    Collection Time: 07/23/25  5:38 AM   Result Value Ref Range    POC Glucose 133 (H) 65 - 117 mg/dL    Performed by: TENZIN Dave    POCT Glucose

## 2025-07-24 LAB
BACTERIA SPEC CULT: ABNORMAL
CRP SERPL-MCNC: 13.2 MG/DL (ref 0–0.3)
ERYTHROCYTE [SEDIMENTATION RATE] IN BLOOD: 71 MM/HR (ref 0–20)
GLUCOSE BLD STRIP.AUTO-MCNC: 119 MG/DL (ref 65–117)
GLUCOSE BLD STRIP.AUTO-MCNC: 124 MG/DL (ref 65–117)
GLUCOSE BLD STRIP.AUTO-MCNC: 141 MG/DL (ref 65–117)
GLUCOSE BLD STRIP.AUTO-MCNC: 148 MG/DL (ref 65–117)
SERVICE CMNT-IMP: ABNORMAL

## 2025-07-24 PROCEDURE — 2580000003 HC RX 258: Performed by: INTERNAL MEDICINE

## 2025-07-24 PROCEDURE — 6370000000 HC RX 637 (ALT 250 FOR IP): Performed by: NURSE PRACTITIONER

## 2025-07-24 PROCEDURE — 97116 GAIT TRAINING THERAPY: CPT

## 2025-07-24 PROCEDURE — 97530 THERAPEUTIC ACTIVITIES: CPT

## 2025-07-24 PROCEDURE — 86140 C-REACTIVE PROTEIN: CPT

## 2025-07-24 PROCEDURE — 6360000002 HC RX W HCPCS: Performed by: INTERNAL MEDICINE

## 2025-07-24 PROCEDURE — 1100000000 HC RM PRIVATE

## 2025-07-24 PROCEDURE — 2500000003 HC RX 250 WO HCPCS: Performed by: NURSE PRACTITIONER

## 2025-07-24 PROCEDURE — 97535 SELF CARE MNGMENT TRAINING: CPT

## 2025-07-24 PROCEDURE — 82962 GLUCOSE BLOOD TEST: CPT

## 2025-07-24 PROCEDURE — 85652 RBC SED RATE AUTOMATED: CPT

## 2025-07-24 RX ORDER — INSULIN LISPRO 100 [IU]/ML
0-8 INJECTION, SOLUTION INTRAVENOUS; SUBCUTANEOUS
Status: DISCONTINUED | OUTPATIENT
Start: 2025-07-24 | End: 2025-07-26 | Stop reason: HOSPADM

## 2025-07-24 RX ORDER — POLYETHYLENE GLYCOL 3350 17 G/17G
17 POWDER, FOR SOLUTION ORAL DAILY
Status: DISCONTINUED | OUTPATIENT
Start: 2025-07-24 | End: 2025-07-26 | Stop reason: HOSPADM

## 2025-07-24 RX ORDER — SENNA AND DOCUSATE SODIUM 50; 8.6 MG/1; MG/1
1 TABLET, FILM COATED ORAL DAILY
Status: DISCONTINUED | OUTPATIENT
Start: 2025-07-24 | End: 2025-07-26 | Stop reason: HOSPADM

## 2025-07-24 RX ORDER — DEXTROSE MONOHYDRATE 100 MG/ML
INJECTION, SOLUTION INTRAVENOUS CONTINUOUS PRN
Status: DISCONTINUED | OUTPATIENT
Start: 2025-07-24 | End: 2025-07-26 | Stop reason: HOSPADM

## 2025-07-24 RX ADMIN — PIPERACILLIN AND TAZOBACTAM 3375 MG: 3; .375 INJECTION, POWDER, FOR SOLUTION INTRAVENOUS at 09:30

## 2025-07-24 RX ADMIN — GABAPENTIN 600 MG: 600 TABLET, FILM COATED ORAL at 09:23

## 2025-07-24 RX ADMIN — POLYETHYLENE GLYCOL 3350 17 G: 17 POWDER, FOR SOLUTION ORAL at 11:09

## 2025-07-24 RX ADMIN — AMIODARONE HYDROCHLORIDE 200 MG: 200 TABLET ORAL at 09:24

## 2025-07-24 RX ADMIN — OXYCODONE AND ACETAMINOPHEN 1 TABLET: 10; 325 TABLET ORAL at 15:51

## 2025-07-24 RX ADMIN — SODIUM CHLORIDE, PRESERVATIVE FREE 10 ML: 5 INJECTION INTRAVENOUS at 09:24

## 2025-07-24 RX ADMIN — GABAPENTIN 600 MG: 600 TABLET, FILM COATED ORAL at 13:18

## 2025-07-24 RX ADMIN — PIPERACILLIN AND TAZOBACTAM 3375 MG: 3; .375 INJECTION, POWDER, FOR SOLUTION INTRAVENOUS at 16:32

## 2025-07-24 RX ADMIN — OXYCODONE AND ACETAMINOPHEN 1 TABLET: 10; 325 TABLET ORAL at 20:15

## 2025-07-24 RX ADMIN — SODIUM CHLORIDE, PRESERVATIVE FREE 10 ML: 5 INJECTION INTRAVENOUS at 21:09

## 2025-07-24 RX ADMIN — FERRIC OXIDE RED: 8; 8 LOTION TOPICAL at 21:11

## 2025-07-24 RX ADMIN — GABAPENTIN 600 MG: 600 TABLET, FILM COATED ORAL at 21:08

## 2025-07-24 RX ADMIN — APIXABAN 5 MG: 5 TABLET, FILM COATED ORAL at 09:23

## 2025-07-24 RX ADMIN — APIXABAN 5 MG: 5 TABLET, FILM COATED ORAL at 21:09

## 2025-07-24 RX ADMIN — FERRIC OXIDE RED: 8; 8 LOTION TOPICAL at 09:24

## 2025-07-24 RX ADMIN — PIPERACILLIN AND TAZOBACTAM 3375 MG: 3; .375 INJECTION, POWDER, FOR SOLUTION INTRAVENOUS at 01:01

## 2025-07-24 RX ADMIN — OXYCODONE AND ACETAMINOPHEN 1 TABLET: 10; 325 TABLET ORAL at 01:02

## 2025-07-24 RX ADMIN — SENNOSIDES AND DOCUSATE SODIUM 1 TABLET: 8.6; 5 TABLET ORAL at 11:09

## 2025-07-24 RX ADMIN — ATORVASTATIN CALCIUM 40 MG: 40 TABLET, FILM COATED ORAL at 21:08

## 2025-07-24 RX ADMIN — TRAZODONE HYDROCHLORIDE 50 MG: 50 TABLET ORAL at 21:09

## 2025-07-24 ASSESSMENT — PAIN DESCRIPTION - DESCRIPTORS: DESCRIPTORS: ACHING

## 2025-07-24 ASSESSMENT — PAIN SCALES - GENERAL
PAINLEVEL_OUTOF10: 8
PAINLEVEL_OUTOF10: 7
PAINLEVEL_OUTOF10: 6
PAINLEVEL_OUTOF10: 8

## 2025-07-24 ASSESSMENT — PAIN DESCRIPTION - LOCATION
LOCATION: FOOT;SHOULDER
LOCATION: FOOT;SHOULDER

## 2025-07-24 ASSESSMENT — PAIN DESCRIPTION - ORIENTATION: ORIENTATION: LEFT

## 2025-07-24 ASSESSMENT — PAIN DESCRIPTION - FREQUENCY: FREQUENCY: CONTINUOUS

## 2025-07-24 ASSESSMENT — PAIN - FUNCTIONAL ASSESSMENT: PAIN_FUNCTIONAL_ASSESSMENT: ACTIVITIES ARE NOT PREVENTED

## 2025-07-24 ASSESSMENT — PAIN DESCRIPTION - PAIN TYPE: TYPE: ACUTE PAIN;SURGICAL PAIN

## 2025-07-24 ASSESSMENT — PAIN DESCRIPTION - ONSET: ONSET: ON-GOING

## 2025-07-24 NOTE — CARE COORDINATION
Transition of Care Plan    D/c plan:  - [facility] SNF accepted (auth pending)  - will likely need [facility] transport at d/c       RUR: 13%  Prior Level of Functioning: indep, would schedule REYNA transport to go to [facility]   Disposition: SNF (pending choice and accepting facility)  TEERSA: 1-2 days    If SNF or IPR: Date FOC offered: 7/23  Date FOC received: 7/23 - [facility]  Accepting facility: [facility]  Date authorization started with reference number: 7/24  Date authorization received and expires:   Follow up appointments: per physician recommendation  DME needed: SNF to order ongoing DME needs  Transportation at discharge: will likely need [facility] transport at d/c   IM/IMM Medicare/ letter given: 1st IM /21  Is patient a  and connected with [facility]? N/A   If yes, was  transfer form completed and VA notified?   Caregiver Contact: Shiraz Etienne [phone]  Discharge Caregiver contacted prior to discharge?   Care Conference needed? N/A  Barriers to discharge: SNF bed and auth    CM met with patient at the bedside to confirm choice. Patient agreeable to d/c to [facility]. Cousin, who lives in [place], to tour [facility].    Message sent to  TL request auth be started.    12:00PM - per CM TL, they are unable to obtain auth for SNF due to patient's specific dual plan.    CM requested that [facility] SNF start auth. Per Rebecca at [facility], auth has been started.    At this time, all questions have been answered.    BRAYAN Ocampo  Care Management

## 2025-07-24 NOTE — PROGRESS NOTES
Foot and ankle brief note:    - POD 1 left foot TMA with EHL transfer, closed (By Dr. Orourke, 7/22/2025)    - Patient clear for discharge from foot and ankle. No further surgical plans   - Path and culture pending, ABX per ID  - Keep dressing CDI - WILL CHANGE DRESSING TO BE SMALL ON MONDAY TO FIT IN CAM BOOT  - Patient will need to call [facility] for a follow up appt in 5-10 days. Clinic Number [phone].   - WBAT LLE in CAM boot

## 2025-07-24 NOTE — PROGRESS NOTES
[facility] Adult  Hospitalist Group                                                                                          Hospitalist Progress Note  Migdalia ROSA Roberts NP  Office Phone: [phone]        Date of Service:  2025  NAME:  Job Ruelas  :  1960  MRN:  [id number]       Admission Summary:   Job Ruelas is a 65 y.o. male with a past medical history significant for diabetes, hypertension, A-fib on OAC therapy, diabetic neuropathy and a nonhealing left foot wound who was transferred from [facility] with osteomyelitis.  Patient reports he has been in a cast for 3 months up until 2 weeks ago at which time the cast was removed and he was told to use a walking boot. Patient has been followed by Dr Gan at [facility] wound care.  He noticed increased swelling and pain in his left foot and that it \"started turning colors yesterday\".  He soaked the foot in Epsom salts but the swelling and pain continued and he developed some nausea and vomiting as well as shortness of breath so he went to the hospital.  The nausea and vomiting have abated as well as the shortness of breath however on imaging he was found to have osteomyelitis of the second metatarsal head and neck and septic arthritis of the third MTP articulation with dorsal dislocation of the fifth phalangeal base and soft tissues gas extending within the third toe distally indicative of septic tenosynovitis.  The patient was transferred to [facility] for evaluation by podiatry.  He has had previous amputation of the 1st and 2nd toes of the L foot 5 years ago at [facility].     He reports his blood sugars run generally between 130 and 160.  He is followed by Dr. Loyd at [facility] for cardiology.  Reports he needs a right shoulder replacement but the left foot issue has prevented that from occurring.  He receives Percocet from a pain management specialist.  He lives with his son and his son's girlfriend and is

## 2025-07-24 NOTE — PROGRESS NOTES
Nephrology Progress Note         Patient: Job Ruelas MRN: [id number]  SSN: [id number]    YOB: 1960  Age: 65 y.o.  Sex: male          Subjective:     Patient seen at bedside, comfortably sitting up in bed, denied having any active issues.  Denied having any LUTS symptoms    Objective:     Vitals:    07/23/25 1403 07/23/25 2056 07/24/25 0219 07/24/25 0833   BP: 117/63 113/62 111/65 (!) 113/59   Pulse: 76 84 75 80   Resp: 18 18 18 18   Temp: 98.4 °F (36.9 °C) 100.2 °F (37.9 °C) 98.2 °F (36.8 °C) 97.7 °F (36.5 °C)   TempSrc: Oral Oral Oral Oral   SpO2: 95% 93% 93% 97%        Intake and Output:  Yesterday's Intake and Output:  07/23 0701 - 07/24 0700  In: 250 [P.O.:250]  Out: -      Physical Exam:   GENERAL: alert, cooperative, no distress, appears stated age  EYE: negative , clear cornea and conjunctiva   HEENT - Normal nose, moist mucous membranes , no oral ulcers/thrush  ABDOMEN: soft, non-tender. Bowel sounds normal. No masses,  no organomegaly  EXTREMITIES:  extremities normal, atraumatic, no cyanosis or edema, no ulcers,  Skin -  no rash or dry, intact , no abnormalities  NEUROLOGIC: negative, aao*3 , normal speech    Data Review      Lab    Recent Labs     07/22/25  0448 07/23/25  0336   WBC 7.7  --    HGB 12.8 11.6*   HCT 40.2 37.2     --      Recent Labs     07/22/25  0448 07/23/25  0336    134*   K 4.4 4.4    102   CO2 26 24   BUN 26* 22*   CREATININE 1.47* 1.25   LABGLOM 52* 63       Assessment & Plan:     Principal Problem:    Osteomyelitis (HCC)  Active Problems:    Acute osteomyelitis of left foot (HCC)    Diabetic foot infection (HCC)  Resolved Problems:    * No resolved hospital problems. *    CKD3b - Creatinine on admission was 1.9, with EGFR of approximately 40 mL/min  - Serum creatinine has been in this range since 7595-2423  - Likely etiology for CKD is DM 2, hypertension and arteriosclerosis. No evidence of  any CRISTINA in the post op period , also had IV contrast

## 2025-07-25 ENCOUNTER — APPOINTMENT (OUTPATIENT)
Facility: HOSPITAL | Age: 65
End: 2025-07-25
Attending: ORTHOPAEDIC SURGERY
Payer: MEDICARE

## 2025-07-25 LAB
ANION GAP SERPL CALC-SCNC: 6 MMOL/L (ref 2–12)
BACTERIA SPEC CULT: ABNORMAL
BUN SERPL-MCNC: 21 MG/DL (ref 6–20)
BUN/CREAT SERPL: 14 (ref 12–20)
CALCIUM SERPL-MCNC: 9 MG/DL (ref 8.5–10.1)
CHLORIDE SERPL-SCNC: 103 MMOL/L (ref 97–108)
CO2 SERPL-SCNC: 27 MMOL/L (ref 21–32)
COMMENT:: NORMAL
CREAT SERPL-MCNC: 1.46 MG/DL (ref 0.7–1.3)
CRP SERPL-MCNC: 13.8 MG/DL (ref 0–0.3)
ERYTHROCYTE [DISTWIDTH] IN BLOOD BY AUTOMATED COUNT: 15 % (ref 11.5–14.5)
GLUCOSE BLD STRIP.AUTO-MCNC: 112 MG/DL (ref 65–117)
GLUCOSE BLD STRIP.AUTO-MCNC: 114 MG/DL (ref 65–117)
GLUCOSE BLD STRIP.AUTO-MCNC: 153 MG/DL (ref 65–117)
GLUCOSE BLD STRIP.AUTO-MCNC: 164 MG/DL (ref 65–117)
GLUCOSE SERPL-MCNC: 100 MG/DL (ref 65–100)
GRAM STN SPEC: ABNORMAL
HCT VFR BLD AUTO: 35.6 % (ref 36.6–50.3)
HCT VFR BLD AUTO: 35.7 % (ref 36.6–50.3)
HGB BLD-MCNC: 11 G/DL (ref 12.1–17)
HGB BLD-MCNC: 11.4 G/DL (ref 12.1–17)
MCH RBC QN AUTO: 29.7 PG (ref 26–34)
MCHC RBC AUTO-ENTMCNC: 30.9 G/DL (ref 30–36.5)
MCV RBC AUTO: 96.2 FL (ref 80–99)
NRBC # BLD: 0 K/UL (ref 0–0.01)
NRBC BLD-RTO: 0 PER 100 WBC
PLATELET # BLD AUTO: 237 K/UL (ref 150–400)
PMV BLD AUTO: 9.7 FL (ref 8.9–12.9)
POTASSIUM SERPL-SCNC: 4.1 MMOL/L (ref 3.5–5.1)
RBC # BLD AUTO: 3.7 M/UL (ref 4.1–5.7)
SERVICE CMNT-IMP: ABNORMAL
SERVICE CMNT-IMP: NORMAL
SERVICE CMNT-IMP: NORMAL
SODIUM SERPL-SCNC: 136 MMOL/L (ref 136–145)
SPECIMEN HOLD: NORMAL
WBC # BLD AUTO: 7.4 K/UL (ref 4.1–11.1)

## 2025-07-25 PROCEDURE — 80048 BASIC METABOLIC PNL TOTAL CA: CPT

## 2025-07-25 PROCEDURE — 85027 COMPLETE CBC AUTOMATED: CPT

## 2025-07-25 PROCEDURE — 86140 C-REACTIVE PROTEIN: CPT

## 2025-07-25 PROCEDURE — 82962 GLUCOSE BLOOD TEST: CPT

## 2025-07-25 PROCEDURE — G0545 PR INHERENT VISIT TO INPT: HCPCS | Performed by: INTERNAL MEDICINE

## 2025-07-25 PROCEDURE — 02HV33Z INSERTION OF INFUSION DEVICE INTO SUPERIOR VENA CAVA, PERCUTANEOUS APPROACH: ICD-10-PCS | Performed by: FAMILY MEDICINE

## 2025-07-25 PROCEDURE — 99233 SBSQ HOSP IP/OBS HIGH 50: CPT | Performed by: INTERNAL MEDICINE

## 2025-07-25 PROCEDURE — 2580000003 HC RX 258: Performed by: INTERNAL MEDICINE

## 2025-07-25 PROCEDURE — 6360000002 HC RX W HCPCS: Performed by: INTERNAL MEDICINE

## 2025-07-25 PROCEDURE — 94760 N-INVAS EAR/PLS OXIMETRY 1: CPT

## 2025-07-25 PROCEDURE — 6370000000 HC RX 637 (ALT 250 FOR IP): Performed by: NURSE PRACTITIONER

## 2025-07-25 PROCEDURE — 85014 HEMATOCRIT: CPT

## 2025-07-25 PROCEDURE — 6360000002 HC RX W HCPCS: Performed by: NURSE PRACTITIONER

## 2025-07-25 PROCEDURE — 71045 X-RAY EXAM CHEST 1 VIEW: CPT

## 2025-07-25 PROCEDURE — 2500000003 HC RX 250 WO HCPCS: Performed by: NURSE PRACTITIONER

## 2025-07-25 PROCEDURE — 36569 INSJ PICC 5 YR+ W/O IMAGING: CPT

## 2025-07-25 PROCEDURE — 97535 SELF CARE MNGMENT TRAINING: CPT

## 2025-07-25 PROCEDURE — 1100000000 HC RM PRIVATE

## 2025-07-25 PROCEDURE — 85018 HEMOGLOBIN: CPT

## 2025-07-25 RX ORDER — POLYETHYLENE GLYCOL 3350 17 G/17G
17 POWDER, FOR SOLUTION ORAL DAILY PRN
Qty: 527 G | Refills: 1 | Status: SHIPPED | OUTPATIENT
Start: 2025-07-25 | End: 2025-09-25

## 2025-07-25 RX ORDER — TAZOBACTAM SODIUM AND PIPERACILLIN SODIUM 375; 3 MG/50ML; G/50ML
3375 INJECTION, SOLUTION INTRAVENOUS EVERY 6 HOURS
Qty: 4800 ML | Refills: 0 | Status: SHIPPED | OUTPATIENT
Start: 2025-07-25 | End: 2025-08-18

## 2025-07-25 RX ORDER — SENNA AND DOCUSATE SODIUM 50; 8.6 MG/1; MG/1
1 TABLET, FILM COATED ORAL DAILY
Qty: 30 TABLET | Refills: 0 | Status: SHIPPED | OUTPATIENT
Start: 2025-07-26

## 2025-07-25 RX ORDER — IODINE/SODIUM IODIDE 2 %
TINCTURE TOPICAL 2 TIMES DAILY
Qty: 177 ML | Refills: 0 | Status: SHIPPED | OUTPATIENT
Start: 2025-07-25

## 2025-07-25 RX ORDER — OXYCODONE AND ACETAMINOPHEN 10; 325 MG/1; MG/1
1 TABLET ORAL EVERY 4 HOURS PRN
Qty: 28 TABLET | Refills: 0 | Status: SHIPPED | OUTPATIENT
Start: 2025-07-25 | End: 2025-08-01

## 2025-07-25 RX ORDER — VALSARTAN 80 MG/1
80 TABLET ORAL DAILY
Qty: 30 TABLET | Refills: 3 | Status: SHIPPED | OUTPATIENT
Start: 2025-07-25

## 2025-07-25 RX ORDER — VALSARTAN 40 MG/1
80 TABLET ORAL DAILY
Status: DISCONTINUED | OUTPATIENT
Start: 2025-07-25 | End: 2025-07-26 | Stop reason: HOSPADM

## 2025-07-25 RX ADMIN — GABAPENTIN 600 MG: 600 TABLET, FILM COATED ORAL at 09:29

## 2025-07-25 RX ADMIN — POLYETHYLENE GLYCOL 3350 17 G: 17 POWDER, FOR SOLUTION ORAL at 09:28

## 2025-07-25 RX ADMIN — SENNOSIDES AND DOCUSATE SODIUM 1 TABLET: 8.6; 5 TABLET ORAL at 09:27

## 2025-07-25 RX ADMIN — OXYCODONE AND ACETAMINOPHEN 1 TABLET: 10; 325 TABLET ORAL at 17:56

## 2025-07-25 RX ADMIN — ATORVASTATIN CALCIUM 40 MG: 40 TABLET, FILM COATED ORAL at 22:59

## 2025-07-25 RX ADMIN — APIXABAN 5 MG: 5 TABLET, FILM COATED ORAL at 22:59

## 2025-07-25 RX ADMIN — VALSARTAN 80 MG: 40 TABLET, FILM COATED ORAL at 14:41

## 2025-07-25 RX ADMIN — ACETAMINOPHEN 650 MG: 325 TABLET ORAL at 09:27

## 2025-07-25 RX ADMIN — PIPERACILLIN AND TAZOBACTAM 3375 MG: 3; .375 INJECTION, POWDER, FOR SOLUTION INTRAVENOUS at 01:09

## 2025-07-25 RX ADMIN — AMIODARONE HYDROCHLORIDE 200 MG: 200 TABLET ORAL at 09:27

## 2025-07-25 RX ADMIN — PIPERACILLIN AND TAZOBACTAM 3375 MG: 3; .375 INJECTION, POWDER, FOR SOLUTION INTRAVENOUS at 18:00

## 2025-07-25 RX ADMIN — GABAPENTIN 600 MG: 600 TABLET, FILM COATED ORAL at 14:38

## 2025-07-25 RX ADMIN — APIXABAN 5 MG: 5 TABLET, FILM COATED ORAL at 09:28

## 2025-07-25 RX ADMIN — OXYCODONE AND ACETAMINOPHEN 1 TABLET: 10; 325 TABLET ORAL at 03:24

## 2025-07-25 RX ADMIN — SODIUM CHLORIDE, PRESERVATIVE FREE 10 ML: 5 INJECTION INTRAVENOUS at 23:01

## 2025-07-25 RX ADMIN — OXYCODONE AND ACETAMINOPHEN 1 TABLET: 10; 325 TABLET ORAL at 22:59

## 2025-07-25 RX ADMIN — FERRIC OXIDE RED: 8; 8 LOTION TOPICAL at 23:00

## 2025-07-25 RX ADMIN — SODIUM CHLORIDE, PRESERVATIVE FREE 10 ML: 5 INJECTION INTRAVENOUS at 09:55

## 2025-07-25 RX ADMIN — FERRIC OXIDE RED: 8; 8 LOTION TOPICAL at 09:35

## 2025-07-25 RX ADMIN — PIPERACILLIN AND TAZOBACTAM 3375 MG: 3; .375 INJECTION, POWDER, FOR SOLUTION INTRAVENOUS at 09:34

## 2025-07-25 RX ADMIN — ONDANSETRON 4 MG: 2 INJECTION, SOLUTION INTRAMUSCULAR; INTRAVENOUS at 09:25

## 2025-07-25 RX ADMIN — GABAPENTIN 600 MG: 600 TABLET, FILM COATED ORAL at 22:59

## 2025-07-25 ASSESSMENT — PAIN SCALES - GENERAL
PAINLEVEL_OUTOF10: 5
PAINLEVEL_OUTOF10: 0
PAINLEVEL_OUTOF10: 8
PAINLEVEL_OUTOF10: 2
PAINLEVEL_OUTOF10: 9

## 2025-07-25 ASSESSMENT — PAIN DESCRIPTION - DESCRIPTORS: DESCRIPTORS: ACHING

## 2025-07-25 ASSESSMENT — PAIN DESCRIPTION - ORIENTATION: ORIENTATION: LEFT

## 2025-07-25 ASSESSMENT — PAIN DESCRIPTION - LOCATION
LOCATION: FOOT
LOCATION: FOOT

## 2025-07-25 NOTE — PROGRESS NOTES
Physician Progress Note      PATIENT:               EMERALD ECHEVARRIA  CSN #:                  [id number]  :                       1960  ADMIT DATE:       2025 5:32 AM  DISCH DATE:  RESPONDING  PROVIDER #:        Rebecca Siddiqui DPM          QUERY TEXT:    \"Necrotic\" is documented in the medical record  OP note by Rebecca Gutierrez DPM (Podiatrist).  Please clarify further specificity regarding the   Necrosis.    The clinical indicators include:   OP note by Rebecca Gutierrez DPM (Podiatrist) states:  -\"LEFT TRANSMETATARSAL AMPUTATION WITH TRANSFER EXTENSOR HALLUX TENDON\"  -\"The forefoot was then removed and placed on the back table to be sent to   pathology.  All necrotic and purulent tissue was removed.\"    CT Imaging noted in  PN  by Migdalia Roberts APRN - NP states:  -\"CT of left foot with evidence of osteomyelitis for toes 1 through 3 with   noted soft tissue gas and concern for third toe septic tenosynovitis\"    Wound culture resulted :  MODERATE PROBABLE PROTEUS SPECIES Abnormal  MODERATE 2ND GRAM NEGATIVE SARI Abnormal    Pmhx: DM2 with foot ulcer    Treatment: Forefoot amputation, Zosyn  Options provided:  -- Gas gangrene  -- Other - I will add my own diagnosis  -- Disagree - Not applicable / Not valid  -- Disagree - Clinically unable to determine / Unknown  -- Refer to Clinical Documentation Reviewer    PROVIDER RESPONSE TEXT:    This patient has gas gangrene.    Query created by: Delon Flores on 2025 3:32 PM      Electronically signed by:  Rebecca Siddiqui DPM 2025 4:58 PM

## 2025-07-25 NOTE — PROGRESS NOTES
Infectious Disease Progress  Note    Assessment / Plan:      65-year-old male with left lower extremity diabetic foot infection and osteomyelitis pending TMA.    Will need to complete Zosyn course until 8/18/2025, inclusive.    PICC line placement.    Will sign off, please call with questions.         Reason for Consult: Osteomyelitis  Date of Consultation: July 25, 2025  Date of Admission: 7/21/2025  Referring Physician: Dr. Zhu    HPI:    Delightful 65-year-old male with DM 2, hypertension, retired fisherman, aortic bioprosthetic valve replacement due to severe aortic stenosis, transferred from [facility] for worsening of left lower extremity wound.  Has left-sided foot wound that he has been dealing with for years for which she was seeing wound care outpatient and performing local I&D's, was dissatisfied with this course of care as wound became larger instead of smaller and saw Dr. Gan outpatient who placed foot in a boot to offload pressure for which wound had initially improved significantly although it had recurred in terms of erythema, swelling, drainage and seen locally at [facility] for which she was transferred to [facility] for further evaluation.  Found to have fever to 102.7 and CT scan with destructive changes for toes distally 1 through 3 and seen by podiatry pending TMA tomorrow.  He was given 1 dose of clindamycin and 1 dose of Zosyn and currently afebrile and off of antibiotics.    Recalls being treated with vancomycin and ceftriaxone for which resulted in blistering erythematous rash and concern for \"red man\" syndrome.  It appears that he has tolerated Zosyn inpatient while admitted has been treated with cefazolin in 2023 without issue.    Interval events:    Adamant about going to rehab for optimal wound care.,  Afebrile, constipated, no diarrhea    Past Medical History:  Past Medical History:   Diagnosis Date    Blister of foot, left     diabetic blister     present  Extremities: Left lower extremity dressed postop  Neuro: Alert, oriented to time, place and situation, moves all extremities to commands, verbal   Skin: Significant edema, drainage, erythema to distal left foot pending OR tomorrow  Psych: good affect, good eye contact, non tearful     Central Line:        Labs:   Recent Results (from the past 24 hours)   POCT Glucose    Collection Time: 07/24/25  4:15 PM   Result Value Ref Range    POC Glucose 141 (H) 65 - 117 mg/dL    Performed by: ESTELA Stubbs    POCT Glucose    Collection Time: 07/24/25  8:27 PM   Result Value Ref Range    POC Glucose 148 (H) 65 - 117 mg/dL    Performed by: MARIANA Grullon    POCT Glucose    Collection Time: 07/25/25  6:31 AM   Result Value Ref Range    POC Glucose 112 65 - 117 mg/dL    Performed by: MARIANA Grullon    C-Reactive Protein    Collection Time: 07/25/25  6:59 AM   Result Value Ref Range    CRP 13.80 (H) 0.00 - 0.30 mg/dL   Hemoglobin and Hematocrit    Collection Time: 07/25/25  6:59 AM   Result Value Ref Range    Hemoglobin 11.4 (L) 12.1 - 17.0 g/dL    Hematocrit 35.7 (L) 36.6 - 50.3 %   Basic Metabolic Panel    Collection Time: 07/25/25  6:59 AM   Result Value Ref Range    Sodium 136 136 - 145 mmol/L    Potassium 4.1 3.5 - 5.1 mmol/L    Chloride 103 97 - 108 mmol/L    CO2 27 21 - 32 mmol/L    Anion Gap 6 2 - 12 mmol/L    Glucose 100 65 - 100 mg/dL    BUN 21 (H) 6 - 20 MG/DL    Creatinine 1.46 (H) 0.70 - 1.30 MG/DL    BUN/Creatinine Ratio 14 12 - 20      Est, Glom Filt Rate 52 (L) >60 ml/min/1.73m2    Calcium 9.0 8.5 - 10.1 MG/DL   POCT Glucose    Collection Time: 07/25/25 11:09 AM   Result Value Ref Range    POC Glucose 164 (H) 65 - 117 mg/dL    Performed by: HEBER Delgadlilo    CBC    Collection Time: 07/25/25 11:13 AM   Result Value Ref Range    WBC 7.4 4.1 - 11.1 K/uL    RBC 3.70 (L) 4.10 - 5.70 M/uL    Hemoglobin 11.0 (L) 12.1 - 17.0 g/dL    Hematocrit 35.6 (L) 36.6 - 50.3 %    MCV 96.2 80.0 - 99.0 FL    MCH 29.7 26.0 -

## 2025-07-25 NOTE — DISCHARGE SUMMARY
Discharge Summary       PATIENT ID: Job Ruelas  MRN: [id number]   YOB: 1960    DATE OF ADMISSION: 7/21/2025  5:32 AM    DATE OF DISCHARGE: 7/26/2025   PRIMARY CARE PROVIDER: Rayna Trimble APRN - NP     ATTENDING PHYSICIAN: Charlotte Zhu MD   DISCHARGING PROVIDER: ROSA Loaiza CNP    To contact this individual call [phone] and ask the  to page.  If unavailable ask to be transferred the Adult Hospitalist Department.    CONSULTATIONS: IP CONSULT TO PODIATRY  IP CONSULT TO INFECTIOUS DISEASES  IP CONSULT TO NEPHROLOGY  IP CONSULT TO ORTHOTIST  IP CONSULT TO VASCULAR ACCESS TEAM    PROCEDURES/SURGERIES: Procedure(s):  LEFT TRANSMETATARSAL AMPUTATION WITH TRANSFER EXTENSOR HALLUX TENDON    ADMITTING DIAGNOSES & HOSPITAL COURSE:   Job Ruelas is a 65 y.o. male with a past medical history significant for diabetes, hypertension, A-fib on OAC therapy, diabetic neuropathy and a nonhealing left foot wound who was transferred from [facility] with osteomyelitis.  Patient reports he has been in a cast for 3 months up until 2 weeks ago at which time the cast was removed and he was told to use a walking boot. Patient has been followed by Dr Gan at [facility] wound care.  He noticed increased swelling and pain in his left foot and that it \"started turning colors yesterday\".  He soaked the foot in Epsom salts but the swelling and pain continued and he developed some nausea and vomiting as well as shortness of breath so he went to the hospital.  The nausea and vomiting have abated as well as the shortness of breath however on imaging he was found to have osteomyelitis of the second metatarsal head and neck and septic arthritis of the third MTP articulation with dorsal dislocation of the fifth phalangeal base and soft tissues gas extending within the third  MCG/ACT inhaler Inhale 2 puffs into the lungs every 6 hours as needed for Wheezing  Qty: 18 g, Refills: 3    Associated Diagnoses: Community acquired pneumonia of right upper lobe of lung      magnesium oxide (MAG-OX) 400 MG tablet Take 1 tablet by mouth daily                NOTIFY YOUR PHYSICIAN FOR ANY OF THE FOLLOWING:   Fever over 101 degrees for 24 hours.   Chest pain, shortness of breath, fever, chills, nausea, vomiting, diarrhea, change in mentation, falling, weakness, bleeding. Severe pain or pain not relieved by medications.  Or, any other signs or symptoms that you may have questions about.    DISPOSITION:    Home With:   OT  PT  HH  RN      X Long term SNF/Inpatient Rehab    Independent/assisted living    Hospice    Other:       PATIENT CONDITION AT DISCHARGE:     Functional status    Poor    X Deconditioned     Independent      Cognition   X Lucid     Forgetful     Dementia      Catheters/lines (plus indication)    Rubin    X PICC long-term antibiotics    PEG     None      Code status   X Full code     DNR      PHYSICAL EXAMINATION AT DISCHARGE:    General : alert, oriented x 3, awake, conversational, obese  HEENT: EOMI, normocephalic, atraumatic, moist mucous membranes, edentulous  Neck: supple, nontender, no JVD, no masses or swelling   Respiratory: clear to auscultation, no distress, normal resp rate  Cardiovascular: regular rate and rhythm, no murmur appreciated, normal heart sounds   Abd: Obese, non-tender, soft, no distention, bowel sounds active x 4 quadrants  Genitourinary: no rubin  Extremities: moves all with decreased ROM RUE 2/2 frozen shoulder, normal capillary refill, left foot dressing c/d/i  Neuro: alert, oriented x 3, normal speech, no obvious motor deficits   Skin: warm, dry, normal color, no rash  Psych: normal affect, normal judgment/insight, normal mood       CHRONIC MEDICAL DIAGNOSES:      Greater than 31 minutes were spent with the patient on counseling and coordination of

## 2025-07-25 NOTE — PLAN OF CARE
Problem: Chronic Conditions and Co-morbidities  Goal: Patient's chronic conditions and co-morbidity symptoms are monitored and maintained or improved  7/23/2025 0924 by Pati Johnson RN  Outcome: Progressing  7/22/2025 2130 by Amparo Shelton RN  Outcome: Progressing     Problem: Discharge Planning  Goal: Discharge to home or other facility with appropriate resources  7/23/2025 0924 by Pati Johnson RN  Outcome: Progressing  7/22/2025 2130 by Amparo Shelton RN  Outcome: Progressing     Problem: Pain  Goal: Verbalizes/displays adequate comfort level or baseline comfort level  7/23/2025 0924 by Pati Johnson RN  Outcome: Progressing  7/22/2025 2130 by Amparo Shelton RN  Outcome: Progressing     Problem: Safety - Adult  Goal: Free from fall injury  7/23/2025 0924 by Pati Johnson RN  Outcome: Progressing  7/22/2025 2130 by Amparo Shelton, RN  Outcome: Progressing     
  Problem: Chronic Conditions and Co-morbidities  Goal: Patient's chronic conditions and co-morbidity symptoms are monitored and maintained or improved  7/24/2025 2143 by Soo Miner LPN  Outcome: Progressing  Flowsheets (Taken 7/24/2025 2030)  Care Plan - Patient's Chronic Conditions and Co-Morbidity Symptoms are Monitored and Maintained or Improved: Monitor and assess patient's chronic conditions and comorbid symptoms for stability, deterioration, or improvement  7/24/2025 1459 by Dominic Guzman RN  Outcome: Progressing  Flowsheets (Taken 7/24/2025 0833)  Care Plan - Patient's Chronic Conditions and Co-Morbidity Symptoms are Monitored and Maintained or Improved: Monitor and assess patient's chronic conditions and comorbid symptoms for stability, deterioration, or improvement     Problem: Discharge Planning  Goal: Discharge to home or other facility with appropriate resources  7/24/2025 2143 by Soo Miner LPN  Outcome: Progressing  Flowsheets (Taken 7/24/2025 2030)  Discharge to home or other facility with appropriate resources: Identify barriers to discharge with patient and caregiver  7/24/2025 1459 by Dominic Guzman RN  Outcome: Progressing  Flowsheets (Taken 7/24/2025 0833)  Discharge to home or other facility with appropriate resources: Identify barriers to discharge with patient and caregiver     Problem: Pain  Goal: Verbalizes/displays adequate comfort level or baseline comfort level  7/24/2025 2143 by Soo Miner LPN  Outcome: Progressing  Flowsheets  Taken 7/24/2025 2015 by Dominic Guzman RN  Verbalizes/displays adequate comfort level or baseline comfort level: Assess pain using appropriate pain scale  Taken 7/24/2025 1545 by Dominic Guzman RN  Verbalizes/displays adequate comfort level or baseline comfort level: Assess pain using appropriate pain scale  7/24/2025 1459 by Dominic Guzman RN  Outcome: Progressing  Flowsheets  Taken 7/24/2025 1349  Verbalizes/displays 
  Problem: Chronic Conditions and Co-morbidities  Goal: Patient's chronic conditions and co-morbidity symptoms are monitored and maintained or improved  Outcome: Progressing     Problem: Discharge Planning  Goal: Discharge to home or other facility with appropriate resources  Outcome: Progressing     Problem: Pain  Goal: Verbalizes/displays adequate comfort level or baseline comfort level  Outcome: Progressing     
  Problem: Chronic Conditions and Co-morbidities  Goal: Patient's chronic conditions and co-morbidity symptoms are monitored and maintained or improved  Outcome: Progressing     Problem: Discharge Planning  Goal: Discharge to home or other facility with appropriate resources  Outcome: Progressing     Problem: Pain  Goal: Verbalizes/displays adequate comfort level or baseline comfort level  Outcome: Progressing     
  Problem: Chronic Conditions and Co-morbidities  Goal: Patient's chronic conditions and co-morbidity symptoms are monitored and maintained or improved  Outcome: Progressing     Problem: Discharge Planning  Goal: Discharge to home or other facility with appropriate resources  Outcome: Progressing     Problem: Pain  Goal: Verbalizes/displays adequate comfort level or baseline comfort level  Outcome: Progressing     Problem: Safety - Adult  Goal: Free from fall injury  Outcome: Progressing     
  Problem: Chronic Conditions and Co-morbidities  Goal: Patient's chronic conditions and co-morbidity symptoms are monitored and maintained or improved  Outcome: Progressing     Problem: Discharge Planning  Goal: Discharge to home or other facility with appropriate resources  Outcome: Progressing     Problem: Pain  Goal: Verbalizes/displays adequate comfort level or baseline comfort level  Outcome: Progressing     Problem: Safety - Adult  Goal: Free from fall injury  Outcome: Progressing     
  Problem: Chronic Conditions and Co-morbidities  Goal: Patient's chronic conditions and co-morbidity symptoms are monitored and maintained or improved  Outcome: Progressing     Problem: Discharge Planning  Goal: Discharge to home or other facility with appropriate resources  Outcome: Progressing     Problem: Pain  Goal: Verbalizes/displays adequate comfort level or baseline comfort level  Outcome: Progressing     Problem: Safety - Adult  Goal: Free from fall injury  Outcome: Progressing     Problem: Physical Therapy - Adult  Goal: By Discharge: Performs mobility at highest level of function for planned discharge setting.  See evaluation for individualized goals.  7/24/2025 1328 by Vicky Clayton, PT  Outcome: Progressing  7/24/2025 1307 by Vicky Clayton, PT  Outcome: Progressing     Problem: Occupational Therapy - Adult  Goal: By Discharge: Performs self-care activities at highest level of function for planned discharge setting.  See evaluation for individualized goals.  7/24/2025 1257 by Shira Turk, OT  Outcome: Progressing     
  Problem: Occupational Therapy - Adult  Goal: By Discharge: Performs self-care activities at highest level of function for planned discharge setting.  See evaluation for individualized goals.  Description: FUNCTIONAL STATUS PRIOR TO ADMISSION:  Patient was ambulatory using no DME and was independent for ADLs and household IADLs. Does report difficulty with ambulating long distances in community due to SOB.     Receives Help From: Family, Prior Level of Assist for ADLs: Independent, Prior Level of Assist for Homemaking: Independent, Prior Level of Assist for Transfers: Independent, Active : No     HOME SUPPORT: Patient lived with his stepson and sarahon's wife but didn't require assistance. Reports in the process of looking for a new place to move due to several issues with current living situation.     Occupational Therapy Goals:  Initiated 7/23/2025  1.  Patient will perform grooming in standing with Modified [place] within 7 day(s).  2.  Patient will perform lower body dressing with Minimal Assist within 7 day(s).  3.  Patient will perform bathing with Minimal Assist within 7 day(s).  4.  Patient will perform toilet transfers with Modified [place]  within 7 day(s).  5.  Patient will perform all aspects of toileting with Modified [place] within 7 day(s).  6.  Patient will participate in upper extremity therapeutic exercise/activities with [place] for 10 minutes within 7 day(s).    7.  Patient will utilize energy conservation techniques during functional activities with verbal cues within 7 day(s).  Outcome: Progressing   OCCUPATIONAL THERAPY TREATMENT  Patient: Job Ruelas (65 y.o. male)  Date: 7/25/2025  Primary Diagnosis: Osteomyelitis (HCC) [M86.9]  Procedure(s) (LRB):  LEFT TRANSMETATARSAL AMPUTATION WITH TRANSFER EXTENSOR HALLUX TENDON (Left) 3 Days Post-Op   Precautions: Weight Bearing   Left Lower Extremity Weight Bearing:  (wbat in cam boot; heel weight bearing)   Chart, 
  Problem: Occupational Therapy - Adult  Goal: By Discharge: Performs self-care activities at highest level of function for planned discharge setting.  See evaluation for individualized goals.  Description: FUNCTIONAL STATUS PRIOR TO ADMISSION:  Patient was ambulatory using no DME and was independent for ADLs and household IADLs. Does report difficulty with ambulating long distances in community due to SOB.     Receives Help From: Family, Prior Level of Assist for ADLs: Independent, Prior Level of Assist for Homemaking: Independent, Prior Level of Assist for Transfers: Independent, Active : No     HOME SUPPORT: Patient lived with his stepson and sarahon's wife but didn't require assistance. Reports in the process of looking for a new place to move due to several issues with current living situation.     Occupational Therapy Goals:  Initiated 7/23/2025  1.  Patient will perform grooming in standing with Modified [place] within 7 day(s).  2.  Patient will perform lower body dressing with Minimal Assist within 7 day(s).  3.  Patient will perform bathing with Minimal Assist within 7 day(s).  4.  Patient will perform toilet transfers with Modified [place]  within 7 day(s).  5.  Patient will perform all aspects of toileting with Modified [place] within 7 day(s).  6.  Patient will participate in upper extremity therapeutic exercise/activities with [place] for 10 minutes within 7 day(s).    7.  Patient will utilize energy conservation techniques during functional activities with verbal cues within 7 day(s).  Outcome: Progressing     OCCUPATIONAL THERAPY TREATMENT  Patient: Job Ruelas (65 y.o. male)  Date: 7/24/2025  Primary Diagnosis: Osteomyelitis (HCC) [M86.9]  Procedure(s) (LRB):  LEFT TRANSMETATARSAL AMPUTATION WITH TRANSFER EXTENSOR HALLUX TENDON (Left) 2 Days Post-Op   Precautions: Weight Bearing   Left Lower Extremity Weight Bearing:  (wbat in cam boot; heel weight bearing)          
  Problem: Occupational Therapy - Adult  Goal: By Discharge: Performs self-care activities at highest level of function for planned discharge setting.  See evaluation for individualized goals.  Description: FUNCTIONAL STATUS PRIOR TO ADMISSION:  Patient was ambulatory using no DME and was independent for ADLs and household IADLs. Does report difficulty with ambulating long distances in community due to SOB.     Receives Help From: Family, Prior Level of Assist for ADLs: Independent, Prior Level of Assist for Homemaking: Independent, Prior Level of Assist for Transfers: Independent, Active : No     HOME SUPPORT: Patient lived with his stepson and sarahon's wife but didn't require assistance. Reports in the process of looking for a new place to move due to several issues with current living situation.     Occupational Therapy Goals:  Initiated 7/23/2025  1.  Patient will perform grooming in standing with Modified [place] within 7 day(s).  2.  Patient will perform lower body dressing with Minimal Assist within 7 day(s).  3.  Patient will perform bathing with Minimal Assist within 7 day(s).  4.  Patient will perform toilet transfers with Modified [place]  within 7 day(s).  5.  Patient will perform all aspects of toileting with Modified [place] within 7 day(s).  6.  Patient will participate in upper extremity therapeutic exercise/activities with [place] for 10 minutes within 7 day(s).    7.  Patient will utilize energy conservation techniques during functional activities with verbal cues within 7 day(s).  Outcome: Progressing   OCCUPATIONAL THERAPY EVALUATION    Patient: Job Ruelas (65 y.o. male)  Date: 7/23/2025  Primary Diagnosis: Osteomyelitis (HCC) [M86.9]  Procedure(s) (LRB):  LEFT TRANSMETATARSAL AMPUTATION WITH TRANSFER EXTENSOR HALLUX TENDON (Left) 1 Day Post-Op     Precautions: Weight Bearing   Left Lower Extremity Weight Bearing:  (wbat in cam boot; heel weight bearing)          
  Problem: Physical Therapy - Adult  Goal: By Discharge: Performs mobility at highest level of function for planned discharge setting.  See evaluation for individualized goals.  Description: FUNCTIONAL STATUS PRIOR TO ADMISSION: Patient was independent and active without use of DME in home.    HOME SUPPORT PRIOR TO ADMISSION: The patient lived with family but did not require assistance.    Physical Therapy Goals  Initiated 7/23/2025  2.  Patient will perform sit to stand with modified independence within 7 day(s).  3.  Patient will transfer from bed to chair and chair to bed with modified independence using the least restrictive device within 7 day(s).  4.  Patient will ambulate with modified independence for 50 feet with the least restrictive device within 7 day(s).   5.  Patient will ascend/descend 4 stairs with one handrail(s) with contact guard assist within 7 day(s).   Outcome: Progressing       PHYSICAL THERAPY EVALUATION    Patient: Job Ruelas (65 y.o. male)  Date: 7/23/2025  Primary Diagnosis: Osteomyelitis (HCC) [M86.9]  Procedure(s) (LRB):  LEFT TRANSMETATARSAL AMPUTATION WITH TRANSFER EXTENSOR HALLUX TENDON (Left) 1 Day Post-Op   Precautions: Restrictions/Precautions  Restrictions/Precautions: Weight Bearing Lower Extremity Weight Bearing Restrictions  Left Lower Extremity Weight Bearing:  (wbat in cam boot; heel weight bearing)          ASSESSMENT : Job Ruelas is a 65 y.o. male with a past medical history significant for diabetes, hypertension, A-fib on OAC therapy, diabetic neuropathy and a nonhealing left foot wound now POD 1 of left transmet amputation.     DEFICITS/IMPAIRMENTS:   The patient is limited by decreased functional mobility, activity tolerance, balance, increased pain levels who would benefit from PT to address these impairments. Per podiatry-- WBAT LLE in CAM boot. CAM boot obtained - difficulty with donning secondary to bulky dressing. Podiatry perfect served to alert of issue. 
  Problem: Physical Therapy - Adult  Goal: By Discharge: Performs mobility at highest level of function for planned discharge setting.  See evaluation for individualized goals.  Description: FUNCTIONAL STATUS PRIOR TO ADMISSION: Patient was independent and active without use of DME in home.    HOME SUPPORT PRIOR TO ADMISSION: The patient lived with family but did not require assistance.    Physical Therapy Goals  Initiated 7/23/2025  2.  Patient will perform sit to stand with modified independence within 7 day(s).  3.  Patient will transfer from bed to chair and chair to bed with modified independence using the least restrictive device within 7 day(s).  4.  Patient will ambulate with modified independence for 50 feet with the least restrictive device within 7 day(s).   5.  Patient will ascend/descend 4 stairs with one handrail(s) with contact guard assist within 7 day(s).   Outcome: Progressing   PHYSICAL THERAPY TREATMENT    Patient: Job Ruelas (65 y.o. male)  Date: 7/24/2025  Diagnosis: Osteomyelitis (HCC) [M86.9] Osteomyelitis (HCC)  Procedure(s) (LRB):  LEFT TRANSMETATARSAL AMPUTATION WITH TRANSFER EXTENSOR HALLUX TENDON (Left) 2 Days Post-Op  Precautions: Restrictions/Precautions  Restrictions/Precautions: Weight Bearing Lower Extremity Weight Bearing Restrictions  Left Lower Extremity Weight Bearing:  (wbat in cam boot; heel weight bearing)          ASSESSMENT:  Patient continues to benefit from skilled PT services and is progressing towards goals. Pt tolerates mobility well at bedside and standing, unable to fit CAM boot on over bulky ace wrap. Messaged MD regarding rewrapping acewrap vs changing boot to cast boot for adequate width, no reply. Pt tolerates gait to the bathroom with excellent tipping up/avoiding placing pressure through midfoot, able to maintain heel only. Pt performs tasks at sink in sitting and mobilizing back to bed. Pt tolerates overall well and progressing towards PLOF.     
INFECTIOUS DISEASE discharge plan:    Diagnosis: Left foot osteomyelitis    Antibiotic: Zosyn 3.375 g IV every 6 hours    PICC line insertion for outpatient antibiotic.     Routine PICC/ Rachel/ Portacath Care including PRN catheter flow management    Weekly labs with results fax to # [phone]. Call critical lab values to [phone].   [x] CBC w/diff  [x] BUN/Creatinine  [x] AST, ALT  [] CPK  [x] CRP    Home Health to pull PICC line after last dose or send to IR for Rachel removal    May send to IR for line evaluation or replacement PRN Phone # [phone]    Follow up with podiatry, PCP.        
of Assist for Homemaking: Independent, Prior Level of Assist for Transfers: Independent, Active : No     HOME SUPPORT: Patient lived with his stepson and cheryl's wife but didn't require assistance. Reports in the process of looking for a new place to move due to several issues with current living situation.     Occupational Therapy Goals:  Initiated 7/23/2025  1.  Patient will perform grooming in standing with Modified [place] within 7 day(s).  2.  Patient will perform lower body dressing with Minimal Assist within 7 day(s).  3.  Patient will perform bathing with Minimal Assist within 7 day(s).  4.  Patient will perform toilet transfers with Modified [place]  within 7 day(s).  5.  Patient will perform all aspects of toileting with Modified [place] within 7 day(s).  6.  Patient will participate in upper extremity therapeutic exercise/activities with [place] for 10 minutes within 7 day(s).    7.  Patient will utilize energy conservation techniques during functional activities with verbal cues within 7 day(s).  7/23/2025 1224 by Sonya Nicole, OT  Outcome: Progressing

## 2025-07-25 NOTE — PROCEDURES
PICC Line Insertion Procedure Note    Procedure: Insertion of #4 FR/18G PICC    Indications:  Long Term IV therapy, Home IV therapy, Pt./Dr. Preference    Procedure Details   Informed consent was obtained for the procedure.  Risks of hemorrhage, thrombus and adverse drug reaction were discussed. Vessel assessment revealed compressible vessels with no evidence of clot.     UE PICC placed without complication for patient.  2ml of Lidocaine 1% administered via infiltration prior to PICC insertion.  Time-Out performed at bedside with ADAIR SMITH.      #4 FR/18G PICC inserted to the L BRACHIAL vein per hospital protocol.   Blood return:  yes    Catheter length 48 cm, with 0 cm exposed. Mid upper arm circumference is 34 cm.   Catheter was flushed with 20 cc NS. Patient did tolerate procedure well.    Unable to confirm PICC tip location with [place] 3CG. STAT CXR ordered to confirm PICC placement.      Upon completion of procedure, all PICC kit components accounted for and intact.  Post procedure hand-off given to Nurse.  PICC Brochure given to patient with teaching instruction. Bed returned to low locked position with call bell in reach.  PROCEDURE NOTE  Date: 7/25/2025   Name: Job Ruelas  YOB: 1960    Procedures

## 2025-07-25 NOTE — PROGRESS NOTES
[facility] Adult  Hospitalist Group                                                                                          Hospitalist Progress Note  Bettye Hendrickson, ROSA - CNP  Office Phone: [phone]        Date of Service:  2025  NAME:  Job Ruelas  :  1960  MRN:  [id number]       Admission Summary:   Job Ruelas is a 65 y.o. male with a past medical history significant for diabetes, hypertension, A-fib on OAC therapy, diabetic neuropathy and a nonhealing left foot wound who was transferred from [facility] with osteomyelitis.  Patient reports he has been in a cast for 3 months up until 2 weeks ago at which time the cast was removed and he was told to use a walking boot. Patient has been followed by Dr Gan at [facility] wound care.  He noticed increased swelling and pain in his left foot and that it \"started turning colors yesterday\".  He soaked the foot in Epsom salts but the swelling and pain continued and he developed some nausea and vomiting as well as shortness of breath so he went to the hospital.  The nausea and vomiting have abated as well as the shortness of breath however on imaging he was found to have osteomyelitis of the second metatarsal head and neck and septic arthritis of the third MTP articulation with dorsal dislocation of the fifth phalangeal base and soft tissues gas extending within the third toe distally indicative of septic tenosynovitis.  The patient was transferred to [facility] for evaluation by podiatry.  He has had previous amputation of the 1st and 2nd toes of the L foot 5 years ago at [facility].     He reports his blood sugars run generally between 130 and 160.  He is followed by Dr. Loyd at [facility] for cardiology.  Reports he needs a right shoulder replacement but the left foot issue has prevented that from occurring.  He receives Percocet from a pain management specialist.  He lives with his son and his son's girlfriend and is  (SENOKOT-S) 8.6-50 MG tablet 1 tablet  1 tablet Oral Daily    insulin lispro (HUMALOG,ADMELOG) injection vial 0-8 Units  0-8 Units SubCUTAneous 4x Daily AC & HS    glucose chewable tablet 16 g  4 tablet Oral PRN    dextrose bolus 10% 125 mL  125 mL IntraVENous PRN    Or    dextrose bolus 10% 250 mL  250 mL IntraVENous PRN    glucagon injection 1 mg  1 mg SubCUTAneous PRN    dextrose 10 % infusion   IntraVENous Continuous PRN    piperacillin-tazobactam (ZOSYN) 3,375 mg in sodium chloride 0.9 % 50 mL IVPB (addEASE)  3,375 mg IntraVENous Q8H    Calamine 8-8 % lotion   Topical BID    sodium chloride flush 0.9 % injection 5-40 mL  5-40 mL IntraVENous 2 times per day    sodium chloride flush 0.9 % injection 5-40 mL  5-40 mL IntraVENous PRN    0.9 % sodium chloride infusion   IntraVENous PRN    ondansetron (ZOFRAN-ODT) disintegrating tablet 4 mg  4 mg Oral Q8H PRN    Or    ondansetron (ZOFRAN) injection 4 mg  4 mg IntraVENous Q6H PRN    polyethylene glycol (GLYCOLAX) packet 17 g  17 g Oral Daily PRN    acetaminophen (TYLENOL) tablet 650 mg  650 mg Oral Q6H PRN    Or    acetaminophen (TYLENOL) suppository 650 mg  650 mg Rectal Q6H PRN    amiodarone (CORDARONE) tablet 200 mg  200 mg Oral Daily    apixaban (ELIQUIS) tablet 5 mg  5 mg Oral BID    atorvastatin (LIPITOR) tablet 40 mg  40 mg Oral Nightly    gabapentin (NEURONTIN) tablet 600 mg  600 mg Oral TID    oxyCODONE-acetaminophen (PERCOCET)  MG per tablet 1 tablet  1 tablet Oral Q4H PRN    traZODone (DESYREL) tablet 50 mg  50 mg Oral Nightly    albuterol (PROVENTIL) (2.5 MG/3ML) 0.083% nebulizer solution 2.5 mg  2.5 mg Nebulization Q6H PRN     ______________________________________________________________________  EXPECTED LENGTH OF STAY: 4  ACTUAL LENGTH OF STAY:          4                 Bettye Hendrickson, APRN - CNP

## 2025-07-25 NOTE — PROGRESS NOTES
Nephrology Progress Note         Patient: Job Ruelas MRN: [id number]  SSN: [id number]    YOB: 1960  Age: 65 y.o.  Sex: male          Subjective:     Patient seen at bedside, denied any active complaints     Objective:     Vitals:    07/24/25 1621 07/24/25 2025 07/25/25 0255 07/25/25 0753   BP:  109/70 123/74 138/69   Pulse:  82 76 73   Resp: 18   16   Temp:  98.4 °F (36.9 °C) 97.7 °F (36.5 °C) 98.1 °F (36.7 °C)   TempSrc:    Oral   SpO2:  97% 95% 93%        Intake and Output:  Yesterday's Intake and Output:  07/24 0701 - 07/25 0700  In: 240 [P.O.:240]  Out: -      Physical Exam:   GENERAL: alert, cooperative, no distress, appears stated age  EYE: negative , clear cornea and conjunctiva   HEENT - Normal nose, moist mucous membranes , no oral ulcers/thrush  ABDOMEN: soft, non-tender. Bowel sounds normal. No masses,  no organomegalySkin -  no rash or dry, intact , no abnormalities  NEUROLOGIC: negative, aao*3 , normal speech    Data Review      Lab    Recent Labs     07/23/25  0336 07/25/25  0659   HGB 11.6* 11.4*   HCT 37.2 35.7*     Recent Labs     07/23/25  0336 07/25/25  0659   * 136   K 4.4 4.1    103   CO2 24 27   BUN 22* 21*   CREATININE 1.25 1.46*   LABGLOM 63 52*       Assessment & Plan:     Principal Problem:    Osteomyelitis (HCC)  Active Problems:    Acute osteomyelitis of left foot (HCC)    Diabetic foot infection (HCC)  Resolved Problems:    * No resolved hospital problems. *    CKD3b - Creatinine on admission was 1.9, with EGFR of approximately 40 mL/min  - Serum creatinine has been in this range since 3048-0415  - Likely etiology for CKD is DM 2, hypertension and arteriosclerosis. No evidence of  any CRISTINA in the post op period , also had IV contrast exposure on 7/21  -  renal ultrasound - No hydronephrosis   - Review of UACR from the past revealed he had microalbuminuria.  - will hold sglt2 for now - given hs H/o PAD and non healing foot ulcer   - ARB may be resumed post

## 2025-07-25 NOTE — CARE COORDINATION
Transition of Care Plan    D/c plan:  - [facility] SNF accepted, bed available 7/26       - room # 132A       - report # [phone]       [phone]  - [facility] scheduled for 1pm (not )      RUR: 14%  Prior Level of Functioning: indep, would schedule [facility] transport to go to [facility]   Disposition: SNF (pending choice and accepting facility)  TERESA: 1-2 days    If SNF or IPR: Date FOC offered: 7/23  Date FOC received: 7/23 - [facility], [facility], [facility], and [facility]  Accepting facility: [facility]  Date authorization started with reference number: auth started 7/24  Date authorization received and expires: auth approved 7/26  Follow up appointments: per physician recommendation  DME needed: SNF to order ongoing DME needs  Transportation at discharge: will likely need REYNA transport at d/c   IM/IMM Medicare/ letter given: 1st IM 7/21  Is patient a  and connected with [facility]? N/A   If yes, was [place] transfer form completed and VA notified?   Caregiver Contact: ольга/[place] - [phone]  Discharge Caregiver contacted prior to discharge?   Care Conference needed? N/A  Barriers to discharge: SNF bed and auth    CM spoke with rep Rebecca at [facility]. Per dre Fernandez has been accepted and [facility] has bed available 7/26.    CM confirmed with attending that patient will be stable on 7/26.    CM met with patient at the bedside to follow up on care and provide update. Per patient, preference is for CM to set up transportation.    [facility] scheduled for 1pm (not ). CM provided update on transportation to Rebecca at [facility].    At this time, all questions have been answered.    Transition of Care Plan to SNF/Rehab    Communication to Patient/Family:  Met with patient and family and they are agreeable to the transition plan. The Plan for Transition of Care is related to the following treatment goals: SNF    The Patient and/or patient representative was  provided with a choice of provider and agrees  with the discharge plan.      Yes [x] No []    A Freedom of choice list was provided with basic dialogue that supports the patient's individualized plan of care/goals and shares the quality data associated with the providers.       Yes [x] No []    SNF/Rehab Transition:  Patient has been accepted to [facility] SNF/Rehab and meets criteria for admission.   Date of Inpatient Status Order: 7/21  Patient will transported by [facility] and expected to leave at 1pm.    Communication to SNF/Rehab:  Bedside RN, Bubba, has been notified to update the transition plan to the facility and call report (report # [phone]).  Discharge information has been updated on the AVS. And communicated to facility via [employer], or CC link.     Discharge instructions to be uploaded in Careport.    Nursing Please include all hard scripts for controlled substances, med rec and dc summary, and AVS in packet.     Reviewed and confirmed with facility, [facility], can manage the patient care needs for the following:     [place] with (X) only those applicable:  Medication:  []Medications are available at the facility  []IV Antibiotics    []Controlled Substance - hard copies available sent.  []Weekly Labs    Equipment:  []CPAP/BiPAP  []Wound Vacuum  []Rosales or Urinary Device  []PICC/Central Line  []Nebulizer  []Ventilator    Treatment:  []Isolation (for MRSA, VRE, etc.)  []Surgical Drain Management  []Tracheostomy Care  []Dressing Changes  []Dialysis with transportation  []PEG Care  []Oxygen  []Daily Weights for Heart Failure    Dietary:  []Any diet limitations  []Tube Feedings   []Total Parenteral Management (TPN)    Financial Resources:  []Medicaid Application Completed    []UAI Completed and copy given to pt/family  and copy given to pt/family  []A screening has previously been completed.    []Level II Completed    [] Private pay individual who will not become   financially eligible for

## 2025-07-25 NOTE — PROGRESS NOTES
Foot and ankle brief note:    - POD 2 left foot TMA with [facility] transfer, closed (By Dr. Orourke, 7/22/2025)    - Incision with slight necrosis and hematoma. Area decompressed and packed. NIVS normal. Will need to watch outpatient.   - patient fit with CAM boot  - PICC line placed. ABX per ID. Path still pending   - Dispo: tomorrow  - Patient will need to call [facility] for a follow up appt in 5-10 days. Clinic Number [phone].

## 2025-07-26 ENCOUNTER — OFFICE VISIT (OUTPATIENT)
Facility: CLINIC | Age: 65
End: 2025-07-26

## 2025-07-26 VITALS
SYSTOLIC BLOOD PRESSURE: 125 MMHG | OXYGEN SATURATION: 96 % | TEMPERATURE: 98.1 F | RESPIRATION RATE: 17 BRPM | HEART RATE: 70 BPM | DIASTOLIC BLOOD PRESSURE: 92 MMHG

## 2025-07-26 VITALS
HEART RATE: 82 BPM | DIASTOLIC BLOOD PRESSURE: 78 MMHG | SYSTOLIC BLOOD PRESSURE: 126 MMHG | OXYGEN SATURATION: 99 % | RESPIRATION RATE: 18 BRPM | TEMPERATURE: 97.3 F

## 2025-07-26 DIAGNOSIS — I50.32 CHRONIC HEART FAILURE WITH PRESERVED EJECTION FRACTION (HCC): Chronic | ICD-10-CM

## 2025-07-26 DIAGNOSIS — M15.9 GENERALIZED OSTEOARTHRITIS: Chronic | ICD-10-CM

## 2025-07-26 DIAGNOSIS — E11.42 DIABETIC POLYNEUROPATHY ASSOCIATED WITH TYPE 2 DIABETES MELLITUS (HCC): Chronic | ICD-10-CM

## 2025-07-26 DIAGNOSIS — I73.9 PAD (PERIPHERAL ARTERY DISEASE): Chronic | ICD-10-CM

## 2025-07-26 DIAGNOSIS — L97.422 DIABETIC ULCER OF LEFT MIDFOOT ASSOCIATED WITH DIABETES MELLITUS DUE TO UNDERLYING CONDITION, WITH FAT LAYER EXPOSED (HCC): Chronic | ICD-10-CM

## 2025-07-26 DIAGNOSIS — E08.621 DIABETIC ULCER OF LEFT MIDFOOT ASSOCIATED WITH DIABETES MELLITUS DUE TO UNDERLYING CONDITION, WITH FAT LAYER EXPOSED (HCC): Chronic | ICD-10-CM

## 2025-07-26 DIAGNOSIS — E66.9 OBESITY (BMI 30-39.9): Chronic | ICD-10-CM

## 2025-07-26 DIAGNOSIS — N18.32 STAGE 3B CHRONIC KIDNEY DISEASE (HCC): Chronic | ICD-10-CM

## 2025-07-26 DIAGNOSIS — E04.2 MULTIPLE THYROID NODULES: Chronic | ICD-10-CM

## 2025-07-26 DIAGNOSIS — I35.0 NONRHEUMATIC AORTIC VALVE STENOSIS: Primary | Chronic | ICD-10-CM

## 2025-07-26 DIAGNOSIS — I48.0 PAROXYSMAL ATRIAL FIBRILLATION (HCC): Chronic | ICD-10-CM

## 2025-07-26 DIAGNOSIS — M19.011 PRIMARY OSTEOARTHRITIS, RIGHT SHOULDER: Chronic | ICD-10-CM

## 2025-07-26 DIAGNOSIS — I10 ESSENTIAL HYPERTENSION: Chronic | ICD-10-CM

## 2025-07-26 DIAGNOSIS — Z95.2 HISTORY OF TRANSCATHETER AORTIC VALVE REPLACEMENT (TAVR): Chronic | ICD-10-CM

## 2025-07-26 DIAGNOSIS — E11.51 TYPE 2 DIABETES MELLITUS WITH PERIPHERAL VASCULAR DISEASE (HCC): Chronic | ICD-10-CM

## 2025-07-26 DIAGNOSIS — E78.2 MIXED HYPERLIPIDEMIA: Chronic | ICD-10-CM

## 2025-07-26 PROBLEM — L08.9 DIABETIC FOOT INFECTION (HCC): Status: RESOLVED | Noted: 2025-07-21 | Resolved: 2025-07-26

## 2025-07-26 PROBLEM — M86.172 ACUTE OSTEOMYELITIS OF LEFT FOOT (HCC): Status: RESOLVED | Noted: 2025-07-21 | Resolved: 2025-07-26

## 2025-07-26 PROBLEM — M86.9 OSTEOMYELITIS (HCC): Status: RESOLVED | Noted: 2025-07-21 | Resolved: 2025-07-26

## 2025-07-26 PROBLEM — E11.628 DIABETIC FOOT INFECTION (HCC): Status: RESOLVED | Noted: 2025-07-21 | Resolved: 2025-07-26

## 2025-07-26 PROBLEM — L97.522 DIABETIC ULCER OF LEFT FOOT ASSOCIATED WITH DIABETES MELLITUS DUE TO UNDERLYING CONDITION, WITH FAT LAYER EXPOSED (HCC): Chronic | Status: ACTIVE | Noted: 2025-02-10

## 2025-07-26 LAB
BACTERIA SPEC CULT: NORMAL
BACTERIA SPEC CULT: NORMAL
CRP SERPL-MCNC: 11.3 MG/DL (ref 0–0.3)
ERYTHROCYTE [DISTWIDTH] IN BLOOD BY AUTOMATED COUNT: 15 % (ref 11.5–14.5)
GLUCOSE BLD STRIP.AUTO-MCNC: 117 MG/DL (ref 65–117)
GLUCOSE BLD STRIP.AUTO-MCNC: 143 MG/DL (ref 65–117)
HCT VFR BLD AUTO: 35 % (ref 36.6–50.3)
HGB BLD-MCNC: 11 G/DL (ref 12.1–17)
MCH RBC QN AUTO: 30 PG (ref 26–34)
MCHC RBC AUTO-ENTMCNC: 31.4 G/DL (ref 30–36.5)
MCV RBC AUTO: 95.4 FL (ref 80–99)
NRBC # BLD: 0 K/UL (ref 0–0.01)
NRBC BLD-RTO: 0 PER 100 WBC
PLATELET # BLD AUTO: 236 K/UL (ref 150–400)
PMV BLD AUTO: 9.6 FL (ref 8.9–12.9)
RBC # BLD AUTO: 3.67 M/UL (ref 4.1–5.7)
SERVICE CMNT-IMP: ABNORMAL
SERVICE CMNT-IMP: NORMAL
WBC # BLD AUTO: 7.6 K/UL (ref 4.1–11.1)

## 2025-07-26 PROCEDURE — 6370000000 HC RX 637 (ALT 250 FOR IP): Performed by: NURSE PRACTITIONER

## 2025-07-26 PROCEDURE — 2580000003 HC RX 258: Performed by: INTERNAL MEDICINE

## 2025-07-26 PROCEDURE — 86140 C-REACTIVE PROTEIN: CPT

## 2025-07-26 PROCEDURE — 82962 GLUCOSE BLOOD TEST: CPT

## 2025-07-26 PROCEDURE — 2500000003 HC RX 250 WO HCPCS: Performed by: NURSE PRACTITIONER

## 2025-07-26 PROCEDURE — 6360000002 HC RX W HCPCS: Performed by: INTERNAL MEDICINE

## 2025-07-26 PROCEDURE — 85027 COMPLETE CBC AUTOMATED: CPT

## 2025-07-26 RX ORDER — GABAPENTIN 600 MG/1
600 TABLET ORAL 3 TIMES DAILY
Qty: 90 TABLET | Refills: 0 | Status: SHIPPED | OUTPATIENT
Start: 2025-07-26 | End: 2025-08-25

## 2025-07-26 RX ADMIN — SENNOSIDES AND DOCUSATE SODIUM 1 TABLET: 8.6; 5 TABLET ORAL at 09:39

## 2025-07-26 RX ADMIN — AMIODARONE HYDROCHLORIDE 200 MG: 200 TABLET ORAL at 09:40

## 2025-07-26 RX ADMIN — PIPERACILLIN AND TAZOBACTAM 3375 MG: 3; .375 INJECTION, POWDER, FOR SOLUTION INTRAVENOUS at 04:10

## 2025-07-26 RX ADMIN — APIXABAN 5 MG: 5 TABLET, FILM COATED ORAL at 09:40

## 2025-07-26 RX ADMIN — VALSARTAN 80 MG: 40 TABLET, FILM COATED ORAL at 09:40

## 2025-07-26 RX ADMIN — FERRIC OXIDE RED: 8; 8 LOTION TOPICAL at 09:42

## 2025-07-26 RX ADMIN — OXYCODONE AND ACETAMINOPHEN 1 TABLET: 10; 325 TABLET ORAL at 09:39

## 2025-07-26 RX ADMIN — OXYCODONE AND ACETAMINOPHEN 1 TABLET: 10; 325 TABLET ORAL at 04:00

## 2025-07-26 RX ADMIN — SODIUM CHLORIDE, PRESERVATIVE FREE 10 ML: 5 INJECTION INTRAVENOUS at 09:40

## 2025-07-26 RX ADMIN — POLYETHYLENE GLYCOL 3350 17 G: 17 POWDER, FOR SOLUTION ORAL at 09:40

## 2025-07-26 RX ADMIN — GABAPENTIN 600 MG: 600 TABLET, FILM COATED ORAL at 09:39

## 2025-07-26 ASSESSMENT — PAIN DESCRIPTION - LOCATION: LOCATION: FOOT

## 2025-07-26 ASSESSMENT — PAIN DESCRIPTION - ORIENTATION: ORIENTATION: LEFT

## 2025-07-26 ASSESSMENT — PAIN SCALES - GENERAL
PAINLEVEL_OUTOF10: 3
PAINLEVEL_OUTOF10: 8

## 2025-07-26 ASSESSMENT — PAIN DESCRIPTION - DESCRIPTORS: DESCRIPTORS: ACHING

## 2025-07-26 NOTE — DISCHARGE SUMMARY
Discharge Summary     PATIENT ID: Job Ruelas  MRN: [id number]   YOB: 1960    DATE OF ADMISSION: 7/21/2025  5:32 AM    DATE OF DISCHARGE: 7/26/2025   PRIMARY CARE PROVIDER: Rayna Trimble APRN - NP   ATTENDING PHYSICIAN: Charlotte Zhu MD   DISCHARGING PROVIDER: ROSA Savage NP      CONSULTATIONS: IP CONSULT TO PODIATRY  IP CONSULT TO INFECTIOUS DISEASES  IP CONSULT TO NEPHROLOGY  IP CONSULT TO ORTHOTIST  IP CONSULT TO VASCULAR ACCESS TEAM    PROCEDURES/SURGERIES: Procedure(s):  LEFT TRANSMETATARSAL AMPUTATION WITH TRANSFER EXTENSOR HALLUX TENDON    ADMITTING DIAGNOSES & HOSPITAL COURSE:     Mr. Ruelas is a 65 y.o. 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  Reported he had been in a cast for 3 months up until 2 weeks PTA at which time the cast was removed and he was told to use a walking boot. Patient has been followed by Dr Gan at [facility] wound care.  He noticed increased swelling and pain in his left foot and that it \"started turning colors\".  He soaked the foot in Epsom salts but the swelling and pain continued and then developed nausea/vomiting as well as shortness of breath so he went to the hospital.  The nausea and vomiting and SOB abated, however on imaging, he was found to have osteomyelitis of the second metatarsal head and neck and septic arthritis of the third MTP articulation with dorsal dislocation of the fifth phalangeal base and soft tissues gas extending within the third toe distally indicative of septic tenosynovitis.  He was then transferred to [facility] for evaluation by podiatry.       He  (HUMALOG,ADMELOG) injection vial 0-8 Units  0-8 Units SubCUTAneous 4x Daily AC & HS Minor, Migdalia, APRN - NP        glucose chewable tablet 16 g  4 tablet Oral PRN Minor, Migdalia, APRN - NP        dextrose bolus 10% 125 mL  125 mL IntraVENous PRN Minor, Migdalia, APRN - NP        Or    dextrose bolus 10% 250 mL  250 mL IntraVENous PRN Minor, Migdalia, APRN - NP        glucagon injection 1 mg  1 mg SubCUTAneous PRN Minor, Migdalia, APRN - NP        dextrose 10 % infusion   IntraVENous Continuous PRN Minor, Migdalia, APRN - NP        piperacillin-tazobactam (ZOSYN) 3,375 mg in sodium chloride 0.9 % 50 mL IVPB (addEASE)  3,375 mg IntraVENous Q8H Surya Donald MD   Stopped at 07/26/25 0943    Calamine 8-8 % lotion   Topical BID Surya Donald MD   Given at 07/26/25 0942    sodium chloride flush 0.9 % injection 5-40 mL  5-40 mL IntraVENous 2 times per day Bettye Hendrickson APRN - CNP   10 mL at 07/26/25 0940    sodium chloride flush 0.9 % injection 5-40 mL  5-40 mL IntraVENous PRN Bettye Hendrickson, APRN - CNP        0.9 % sodium chloride infusion   IntraVENous PRN Bettye Hendrickson APRN - CNP        ondansetron (ZOFRAN-ODT) disintegrating tablet 4 mg  4 mg Oral Q8H PRN Bettye Hendrickson APRN - CNP        Or    ondansetron (ZOFRAN) injection 4 mg  4 mg IntraVENous Q6H PRN Bettye Hendrickson, APRN - CNP   4 mg at 07/25/25 0925    polyethylene glycol (GLYCOLAX) packet 17 g  17 g Oral Daily PRN Bettye Hendrickson, APRN - CNP        acetaminophen (TYLENOL) tablet 650 mg  650 mg Oral Q6H PRN Bettye Hendrickson, APRN - CNP   650 mg at 07/25/25 0927    Or    acetaminophen (TYLENOL) suppository 650 mg  650 mg Rectal Q6H PRN Bettye Hendrickson, APRN - CNP        amiodarone (CORDARONE) tablet 200 mg  200 mg Oral Daily Bettye Hendrickson APRN - CNP   200 mg at 07/26/25 0940    apixaban (ELIQUIS) tablet 5 mg  5 mg Oral BID Migdalia Roberts APRN - NP   5 mg at 07/26/25 0940    atorvastatin (LIPITOR) tablet 40 mg  40 mg Oral Nightly Bettye Hendrickson, APRN -

## 2025-07-26 NOTE — DISCHARGE INSTRUCTIONS
promote healthy weight loss     History of alcohol abuse  - Patient has been in recovery for the past 3 years with no alcohol intake         PENDING TEST RESULTS:   At the time of discharge the following test results are still pending: none     FOLLOW UP APPOINTMENTS:           Future Appointments   Date Time Provider Department Center   9/17/2025 10:10 AM Rayna Trimble APRN - NP [facility] MAIN [facility] E[facility] DEP      Call for outpatient appointment at [facility] in 5-10 days. Clinic Number [phone].  Dr. Siddiqui.     ADDITIONAL CARE RECOMMENDATIONS: PICC line placement for long term antibiotic therapy      ID recommendations:    - Antibiotic: Zosyn 3.375 g IV every 6 hours  - Routine PICC/ Rachel/ Portacath Care including PRN catheter flow management  - Weekly labs with results fax to # [phone]. Call critical lab values to [phone].   [x] CBC w/diff  [x] BUN/Creatinine  [x] AST, ALT  [] CPK  [x] CRP     - Home Health/SNF to pull PICC line after last dose or send to IR for Rachel removal  - May send to IR for line evaluation or replacement PRN Phone # [phone]     Check blood glucose 2-3 times daily     DIET: diabetic diet     ACTIVITY: weight bear as tolerated in CAM boot      WOUND CARE: Keep dressing CDI  Podiatry to change dressing at follow up appointment - please schedule     EQUIPMENT needed: CAM boot      DISCHARGE MEDICATIONS:  Current Facility-Administered Medications             Current Facility-Administered Medications   Medication Dose Route Frequency Provider Last Rate Last Admin    valsartan (DIOVAN) tablet 80 mg  80 mg Oral Daily Bettye Hendrickson APRN - CNP   80 mg at 07/26/25 0940    polyethylene glycol (GLYCOLAX) packet 17 g  17 g Oral Daily Migdalia Roberts APRN - NP   17 g at 07/26/25 0940    sennosides-docusate sodium (SENOKOT-S) 8.6-50 MG tablet 1 tablet  1 tablet Oral Daily Migdalia Roberts APRN - NP   1 tablet at 07/26/25 0939    insulin lispro (HUMALOG,ADMELOG)  anything until he got to SNF.  Denies any dysuria.  Reports pain is as controlled as he feels like it can be.  Discussed discharge later on this afternoon ~1 PM.  Has PICC (placed 7/25). Has belongings at bedside including cam boot which he has been fitted for.  Prescription for percocet and gabapentin have been printed for SNF.      General : Older, obese  male lying in bed in no acute distress, cooperative and interactive with assessment  HEENT: EOMI, normocephalic, atraumatic, moist mucous membranes, edentulous  Neck: Trachea midline  Respiratory: clear to auscultation, oxygen sats 96% on room air  Cardiovascular: RRR.  No murmurs noted.  Heart rate 72  Abd: Obese, non-tender, soft, no distention, bowel sounds active.  Reports very small BM.  Declined offer for stimulant at this time, will ask facility when he arrives  Genitourinary: no rubin, voiding freely  Extremities: moves all with decreased ROM RUE 2/2 frozen shoulder   Neuro: Alert and oriented to person, place, time and situation.  Strength is equal, no paresthesias  Skin: warm, dry.  left foot dressing clean and dry  Psych: normal affect, normal judgment/insight, normal mood  Lines: Left upper extremity PICC, dressing is intact     CHRONIC MEDICAL DIAGNOSES:     Greater than 31 minutes were spent with the patient on counseling and coordination of care     Signed:   ROSA Savage NP  7/26/2025  10:58 AM

## 2025-07-28 ENCOUNTER — OFFICE VISIT (OUTPATIENT)
Facility: CLINIC | Age: 65
End: 2025-07-28

## 2025-07-28 DIAGNOSIS — I10 ESSENTIAL HYPERTENSION: Chronic | ICD-10-CM

## 2025-07-28 DIAGNOSIS — M86.9 OSTEOMYELITIS OF LEFT FOOT, UNSPECIFIED TYPE (HCC): ICD-10-CM

## 2025-07-28 DIAGNOSIS — I48.0 PAROXYSMAL ATRIAL FIBRILLATION (HCC): Primary | Chronic | ICD-10-CM

## 2025-07-28 DIAGNOSIS — E78.2 MIXED HYPERLIPIDEMIA: Chronic | ICD-10-CM

## 2025-07-29 NOTE — PROGRESS NOTES
PLACE OF SERVICE:  [facility] [address] [place] [address]  Back to Dr. Sheehan send you to  SKILLED VISIT        Chief Complaint: Left foot osteomyelitis third MPJ requiring IV antibiotics    HPI : Job Ruelas is a 65 y.o. male patient seen today for follow up.  Patient admitted to the facility from hospital secondary to osteomyelitis of the left foot requiring IV antibiotics until 8/18/2025.  Hospital records reviewed, patient transferred from [facility] With osteomyelitis.  Left leg cast x 3 months, cast was removes 2 weeks ago and place in a walking boot, patient has been followed by Dr. Gan.  Patient reported increased swelling and pain with discoloration to left foot, he developed nausea and vomiting and shortness of breath and was sent to the hospital.  Images positive for osteomyelitis of the second metatarsal head and neck and septic arthritis of the third MTP articulation with dorsal dislocation of the fifth phalangeal base and soft tissues gas extending within the third toe distally indicative of septic tenosynovitis.  Patient was then transferred to [facility] for podiatry evaluation.  ID recommended antibiotic until 8/18/2025 via PICC line.  Patient has limited vision and is unable to adequately provide care to his room.  Podiatry recommendation, dressing to be kept in place until follow-up appointment.  Nursing staff to schedule follow-up appointment with Dr. Binta Siddiqui at [facility] [place] at [phone].  Weekly labs to be collected CBC BUN/Creatinine AST ALT and CRP results to be faxed to [phone].   Patient alert and oriented able to make his needs known.  He denies symptom of respiratory distress, lung clear upon assessment.  Continues on albuterol 2 puffs every 6 hours as needed.  He denies pain or discomfort at time of visit, he continues on Percocet 10/325 mg 1 tab every 4 hours as needed for pain, Voltaren gel

## 2025-07-30 ENCOUNTER — OFFICE VISIT (OUTPATIENT)
Facility: CLINIC | Age: 65
End: 2025-07-30

## 2025-07-30 DIAGNOSIS — E78.2 MIXED HYPERLIPIDEMIA: Chronic | ICD-10-CM

## 2025-07-30 DIAGNOSIS — I10 ESSENTIAL HYPERTENSION: Chronic | ICD-10-CM

## 2025-07-30 DIAGNOSIS — Z76.89 ENCOUNTER FOR SOCIAL WORK INTERVENTION: Primary | ICD-10-CM

## 2025-07-30 DIAGNOSIS — I48.0 PAROXYSMAL ATRIAL FIBRILLATION (HCC): Primary | Chronic | ICD-10-CM

## 2025-07-30 DIAGNOSIS — M86.9 OSTEOMYELITIS OF LEFT FOOT, UNSPECIFIED TYPE (HCC): ICD-10-CM

## 2025-07-30 NOTE — PROGRESS NOTES
Social Work Assessment    Date of referral: 7/30/25  Referral received from: Lucero Pettit DNP  Reason for referral: Assess needs    MSW met with Mr. Ruelas to introduce him to [facility] Social Work.  Patient was laying in bed when MSW arrived.  Mr. Ruelas expressed frustration about not having a follow-up podiatry appointment.     MSW will complete assessment at a later time.  Patient preoccupied with follow-up appointment.    Social history    Living situation prior to SNF:       Anticipated living situation following discharge:      Marital status: [] Single   []   []     []    [] Life Partner   [] Other      Family support:      Previous career:     Was assistance needed for ADLs prior to SNF?  [] Yes   [] No  Additional notes:    Spirituality/Yazidism affiliation:   Additional notes:    Is patient a :          [] Yes    []  No  Additional notes:    Advance Care Plan:          [] Yes   [] No  Additional notes:    Housing:   Are you concerned about housing?                                  [] Yes   [] No  Additional notes:    Food:  Can you afford nutritious meals?                                     [] Yes   [] No  Are you able to prepare your own meals?                       [] Yes   [] No  Are you able to access food?                                           [] Yes   [] No  Additional notes:    Financial:   Do you manage your own finances?                               [] Yes   []  No  Can you afford your household expenses?                    [] Yes   [] No  Can you afford your medical copays?                             [] Yes   [] No  Additional notes:    Transportation:  Do you have reliable transportation to medical appointments?   [] Yes   [] No  Additional notes:    Legal:  Are there any existing legal issues?                                                      [] Yes   [] No       Additional notes:    Medications:  Can you obtain your medications from

## 2025-07-31 ENCOUNTER — OFFICE VISIT (OUTPATIENT)
Facility: CLINIC | Age: 65
End: 2025-07-31

## 2025-07-31 DIAGNOSIS — Z76.89 ENCOUNTER FOR SOCIAL WORK INTERVENTION: Primary | ICD-10-CM

## 2025-07-31 NOTE — ASSESSMENT & PLAN NOTE
Patient continues on Eliquis 5 mg twice a day and amiodarone 200 mg daily.  Nursing staff to monitor signs and symptoms of active bleeding

## 2025-07-31 NOTE — PROGRESS NOTES
Social Work Follow-up note     MSW received TC from Dr. Siddiqui office that patient has follow-up on 8/6/25 @ 2pm.  MSW went to [facility] to meet with patient to inform him of appointment.  Aixa Paez ([facility]) was present in patient room.  Patient was expressing concern to Ms. Paez that he did not know when his follow-up appointment was scheduled.  MSW confirmed that appt time and day.  Ms. Paez stated that she would coordinate transportation if MSW provided scheduling details.     Identified Needs:   Follow-up appointment  Transportation to appointment    Social Work Plan:  Patient and [facility] staff informed of f/u appointment  MSW emailed Aixa Paez transportation details for coordination    Internal/External organizations consulted/involved  Dr. Siddiqui office  [facility]    Progress toward goals  In progress    Melissa SCHMIDT, LMSW, [facility]    Senior Care Services  [facility] Office Building   [address] [place] [address]  Cell [phone]

## 2025-07-31 NOTE — PROGRESS NOTES
PLACE OF SERVICE:  [facility] [address] [place] [address]  Back to Dr. Sheehan send you to  SKILLED VISIT        Chief Complaint: Left foot osteomyelitis third MPJ requiring IV antibiotics    HPI : Job Ruelas is a 65 y.o. male patient seen today for follow up.  Patient admitted to the facility from hospital secondary to osteomyelitis of the left foot requiring IV antibiotics until 8/18/2025.  Hospital records reviewed, patient transferred from [facility] With osteomyelitis.  Left leg cast x 3 months, cast was removes 2 weeks ago and place in a walking boot, patient has been followed by Dr. Gan.  Patient reported increased swelling and pain with discoloration to left foot, he developed nausea and vomiting and shortness of breath and was sent to the hospital.  Images positive for osteomyelitis of the second metatarsal head and neck and septic arthritis of the third MTP articulation with dorsal dislocation of the fifth phalangeal base and soft tissues gas extending within the third toe distally indicative of septic tenosynovitis.  Patient was then transferred to [facility] for podiatry evaluation.  ID recommended antibiotic until 8/18/2025 via PICC line.  Patient has limited vision and is unable to adequately provide care to his room.  Podiatry recommendation, dressing to be kept in place until follow-up appointment.  Nursing staff to schedule follow-up appointment with Dr. Binta Siddiqui at [facility] [place] at [phone].  Weekly labs to be collected CBC BUN/Creatinine AST ALT and CRP results to be faxed to [phone].   Patient alert and oriented able to make his needs known.  He denies symptom of respiratory distress, lung clear upon assessment.  Continues on albuterol 2 puffs every 6 hours as needed.  He denies pain or discomfort at time of visit, he continues on Percocet 10/325 mg 1 tab every 4 hours as needed for pain, Voltaren gel  Update History & Physical    The patient's History and Physical of 6/19/24 was reviewed with the patient and I examined the patient. There was no change. The surgical site was confirmed by the patient and me.     Plan: The risks, benefits, expected outcome, and alternative to the recommended procedure have been discussed with the patient. Patient understands and wants to proceed with the procedure.     Electronically signed by Ravi Tobin MD on 6/27/2024 at 7:00 AM

## 2025-08-02 PROBLEM — Z12.11 SCREENING FOR COLON CANCER: Status: RESOLVED | Noted: 2025-04-30 | Resolved: 2025-08-02

## 2025-08-04 ENCOUNTER — OFFICE VISIT (OUTPATIENT)
Facility: CLINIC | Age: 65
End: 2025-08-04
Payer: MEDICARE

## 2025-08-04 DIAGNOSIS — E11.51 TYPE 2 DIABETES MELLITUS WITH PERIPHERAL VASCULAR DISEASE (HCC): ICD-10-CM

## 2025-08-04 DIAGNOSIS — I35.0 NONRHEUMATIC AORTIC VALVE STENOSIS: ICD-10-CM

## 2025-08-04 DIAGNOSIS — I48.0 PAROXYSMAL ATRIAL FIBRILLATION (HCC): Primary | ICD-10-CM

## 2025-08-04 DIAGNOSIS — Z95.2 HISTORY OF TRANSCATHETER AORTIC VALVE REPLACEMENT (TAVR): ICD-10-CM

## 2025-08-04 DIAGNOSIS — N18.32 STAGE 3B CHRONIC KIDNEY DISEASE (HCC): ICD-10-CM

## 2025-08-04 DIAGNOSIS — I10 ESSENTIAL HYPERTENSION: ICD-10-CM

## 2025-08-04 DIAGNOSIS — E78.2 MIXED HYPERLIPIDEMIA: ICD-10-CM

## 2025-08-04 DIAGNOSIS — I50.32 CHRONIC HEART FAILURE WITH PRESERVED EJECTION FRACTION (HCC): ICD-10-CM

## 2025-08-04 DIAGNOSIS — M86.9 OSTEOMYELITIS OF LEFT FOOT, UNSPECIFIED TYPE (HCC): ICD-10-CM

## 2025-08-04 PROCEDURE — 3044F HG A1C LEVEL LT 7.0%: CPT

## 2025-08-04 PROCEDURE — 99309 SBSQ NF CARE MODERATE MDM 30: CPT

## 2025-08-04 PROCEDURE — 1123F ACP DISCUSS/DSCN MKR DOCD: CPT

## 2025-08-06 ENCOUNTER — OFFICE VISIT (OUTPATIENT)
Facility: CLINIC | Age: 65
End: 2025-08-06

## 2025-08-06 DIAGNOSIS — I35.0 NONRHEUMATIC AORTIC VALVE STENOSIS: ICD-10-CM

## 2025-08-06 DIAGNOSIS — I48.0 PAROXYSMAL ATRIAL FIBRILLATION (HCC): Primary | ICD-10-CM

## 2025-08-06 DIAGNOSIS — Z76.89 ENCOUNTER FOR SOCIAL WORK INTERVENTION: Primary | ICD-10-CM

## 2025-08-06 DIAGNOSIS — M86.9 OSTEOMYELITIS OF LEFT FOOT, UNSPECIFIED TYPE (HCC): ICD-10-CM

## 2025-08-06 DIAGNOSIS — I50.32 CHRONIC HEART FAILURE WITH PRESERVED EJECTION FRACTION (HCC): ICD-10-CM

## 2025-08-06 DIAGNOSIS — I10 ESSENTIAL HYPERTENSION: ICD-10-CM

## 2025-08-06 DIAGNOSIS — E78.2 MIXED HYPERLIPIDEMIA: ICD-10-CM

## 2025-08-06 DIAGNOSIS — E11.51 TYPE 2 DIABETES MELLITUS WITH PERIPHERAL VASCULAR DISEASE (HCC): ICD-10-CM

## 2025-08-06 DIAGNOSIS — N18.32 STAGE 3B CHRONIC KIDNEY DISEASE (HCC): ICD-10-CM

## 2025-08-11 ENCOUNTER — OFFICE VISIT (OUTPATIENT)
Facility: CLINIC | Age: 65
End: 2025-08-11
Payer: MEDICARE

## 2025-08-11 DIAGNOSIS — Z95.2 HISTORY OF TRANSCATHETER AORTIC VALVE REPLACEMENT (TAVR): Chronic | ICD-10-CM

## 2025-08-11 DIAGNOSIS — I50.32 CHRONIC HEART FAILURE WITH PRESERVED EJECTION FRACTION (HCC): Chronic | ICD-10-CM

## 2025-08-11 DIAGNOSIS — I35.0 NONRHEUMATIC AORTIC VALVE STENOSIS: Primary | Chronic | ICD-10-CM

## 2025-08-11 DIAGNOSIS — I73.9 PAD (PERIPHERAL ARTERY DISEASE): Chronic | ICD-10-CM

## 2025-08-11 DIAGNOSIS — M86.9 OSTEOMYELITIS OF LEFT FOOT, UNSPECIFIED TYPE (HCC): ICD-10-CM

## 2025-08-11 DIAGNOSIS — I10 ESSENTIAL HYPERTENSION: Chronic | ICD-10-CM

## 2025-08-11 DIAGNOSIS — I48.0 PAROXYSMAL ATRIAL FIBRILLATION (HCC): Chronic | ICD-10-CM

## 2025-08-11 PROCEDURE — 99308 SBSQ NF CARE LOW MDM 20: CPT | Performed by: FAMILY MEDICINE

## 2025-08-11 PROCEDURE — 1123F ACP DISCUSS/DSCN MKR DOCD: CPT | Performed by: FAMILY MEDICINE

## 2025-08-13 ENCOUNTER — OFFICE VISIT (OUTPATIENT)
Facility: CLINIC | Age: 65
End: 2025-08-13
Payer: MEDICARE

## 2025-08-13 ENCOUNTER — OFFICE VISIT (OUTPATIENT)
Facility: CLINIC | Age: 65
End: 2025-08-13

## 2025-08-13 VITALS — HEART RATE: 68 BPM | SYSTOLIC BLOOD PRESSURE: 134 MMHG | RESPIRATION RATE: 16 BRPM | DIASTOLIC BLOOD PRESSURE: 84 MMHG

## 2025-08-13 DIAGNOSIS — E78.2 MIXED HYPERLIPIDEMIA: Chronic | ICD-10-CM

## 2025-08-13 DIAGNOSIS — E11.51 TYPE 2 DIABETES MELLITUS WITH PERIPHERAL VASCULAR DISEASE (HCC): Primary | Chronic | ICD-10-CM

## 2025-08-13 DIAGNOSIS — I10 ESSENTIAL HYPERTENSION: Chronic | ICD-10-CM

## 2025-08-13 DIAGNOSIS — Z76.89 ENCOUNTER FOR SOCIAL WORK INTERVENTION: Primary | ICD-10-CM

## 2025-08-13 DIAGNOSIS — I48.0 PAROXYSMAL ATRIAL FIBRILLATION (HCC): Chronic | ICD-10-CM

## 2025-08-13 DIAGNOSIS — M86.9 OSTEOMYELITIS OF LEFT FOOT, UNSPECIFIED TYPE (HCC): ICD-10-CM

## 2025-08-13 PROCEDURE — 99309 SBSQ NF CARE MODERATE MDM 30: CPT | Performed by: NURSE PRACTITIONER

## 2025-08-13 PROCEDURE — 3044F HG A1C LEVEL LT 7.0%: CPT | Performed by: NURSE PRACTITIONER

## 2025-08-13 PROCEDURE — 1123F ACP DISCUSS/DSCN MKR DOCD: CPT | Performed by: NURSE PRACTITIONER

## 2025-08-18 ENCOUNTER — OFFICE VISIT (OUTPATIENT)
Facility: CLINIC | Age: 65
End: 2025-08-18
Payer: MEDICARE

## 2025-08-18 DIAGNOSIS — I48.0 PAROXYSMAL ATRIAL FIBRILLATION (HCC): Chronic | ICD-10-CM

## 2025-08-18 DIAGNOSIS — I10 ESSENTIAL HYPERTENSION: Chronic | ICD-10-CM

## 2025-08-18 DIAGNOSIS — M86.9 OSTEOMYELITIS OF LEFT FOOT, UNSPECIFIED TYPE (HCC): ICD-10-CM

## 2025-08-18 DIAGNOSIS — E11.51 TYPE 2 DIABETES MELLITUS WITH PERIPHERAL VASCULAR DISEASE (HCC): Primary | Chronic | ICD-10-CM

## 2025-08-18 PROCEDURE — 1123F ACP DISCUSS/DSCN MKR DOCD: CPT | Performed by: NURSE PRACTITIONER

## 2025-08-18 PROCEDURE — 99308 SBSQ NF CARE LOW MDM 20: CPT | Performed by: NURSE PRACTITIONER

## 2025-08-18 PROCEDURE — 3044F HG A1C LEVEL LT 7.0%: CPT | Performed by: NURSE PRACTITIONER

## 2025-08-20 ENCOUNTER — OFFICE VISIT (OUTPATIENT)
Facility: CLINIC | Age: 65
End: 2025-08-20

## 2025-08-20 DIAGNOSIS — E11.51 TYPE 2 DIABETES MELLITUS WITH PERIPHERAL VASCULAR DISEASE (HCC): Primary | Chronic | ICD-10-CM

## 2025-08-20 DIAGNOSIS — E78.2 MIXED HYPERLIPIDEMIA: Chronic | ICD-10-CM

## 2025-08-20 DIAGNOSIS — I48.0 PAROXYSMAL ATRIAL FIBRILLATION (HCC): Chronic | ICD-10-CM

## 2025-08-20 DIAGNOSIS — M86.9 OSTEOMYELITIS OF LEFT FOOT, UNSPECIFIED TYPE (HCC): ICD-10-CM

## 2025-08-20 DIAGNOSIS — I10 ESSENTIAL HYPERTENSION: Chronic | ICD-10-CM

## 2025-08-27 ENCOUNTER — OFFICE VISIT (OUTPATIENT)
Facility: CLINIC | Age: 65
End: 2025-08-27
Payer: MEDICARE

## 2025-08-27 DIAGNOSIS — I48.0 PAROXYSMAL ATRIAL FIBRILLATION (HCC): Chronic | ICD-10-CM

## 2025-08-27 DIAGNOSIS — I10 ESSENTIAL HYPERTENSION: Chronic | ICD-10-CM

## 2025-08-27 DIAGNOSIS — M86.9 OSTEOMYELITIS OF LEFT FOOT, UNSPECIFIED TYPE (HCC): ICD-10-CM

## 2025-08-27 DIAGNOSIS — E11.51 TYPE 2 DIABETES MELLITUS WITH PERIPHERAL VASCULAR DISEASE (HCC): Primary | Chronic | ICD-10-CM

## 2025-08-27 DIAGNOSIS — E78.2 MIXED HYPERLIPIDEMIA: Chronic | ICD-10-CM

## 2025-08-27 PROCEDURE — 3044F HG A1C LEVEL LT 7.0%: CPT | Performed by: NURSE PRACTITIONER

## 2025-08-27 PROCEDURE — 1123F ACP DISCUSS/DSCN MKR DOCD: CPT | Performed by: NURSE PRACTITIONER

## 2025-08-27 PROCEDURE — 99308 SBSQ NF CARE LOW MDM 20: CPT | Performed by: NURSE PRACTITIONER

## 2025-08-28 ENCOUNTER — OFFICE VISIT (OUTPATIENT)
Facility: CLINIC | Age: 65
End: 2025-08-28

## 2025-08-28 DIAGNOSIS — I35.0 NONRHEUMATIC AORTIC VALVE STENOSIS: Primary | Chronic | ICD-10-CM

## 2025-08-28 DIAGNOSIS — I73.9 PAD (PERIPHERAL ARTERY DISEASE): Chronic | ICD-10-CM

## 2025-08-28 DIAGNOSIS — I48.0 PAROXYSMAL ATRIAL FIBRILLATION (HCC): Chronic | ICD-10-CM

## 2025-08-28 DIAGNOSIS — E08.621 DIABETIC ULCER OF LEFT MIDFOOT ASSOCIATED WITH DIABETES MELLITUS DUE TO UNDERLYING CONDITION, WITH FAT LAYER EXPOSED (HCC): Chronic | ICD-10-CM

## 2025-08-28 DIAGNOSIS — I50.32 CHRONIC HEART FAILURE WITH PRESERVED EJECTION FRACTION (HCC): Chronic | ICD-10-CM

## 2025-08-28 DIAGNOSIS — L97.422 DIABETIC ULCER OF LEFT MIDFOOT ASSOCIATED WITH DIABETES MELLITUS DUE TO UNDERLYING CONDITION, WITH FAT LAYER EXPOSED (HCC): Chronic | ICD-10-CM

## 2025-08-28 DIAGNOSIS — I10 ESSENTIAL HYPERTENSION: Chronic | ICD-10-CM

## 2025-08-28 DIAGNOSIS — E11.51 TYPE 2 DIABETES MELLITUS WITH PERIPHERAL VASCULAR DISEASE (HCC): Chronic | ICD-10-CM

## 2025-08-29 ENCOUNTER — OFFICE VISIT (OUTPATIENT)
Facility: CLINIC | Age: 65
End: 2025-08-29
Payer: MEDICARE

## 2025-08-29 DIAGNOSIS — I48.0 PAROXYSMAL ATRIAL FIBRILLATION (HCC): Chronic | ICD-10-CM

## 2025-08-29 DIAGNOSIS — M86.9 OSTEOMYELITIS OF LEFT FOOT, UNSPECIFIED TYPE (HCC): ICD-10-CM

## 2025-08-29 DIAGNOSIS — I10 ESSENTIAL HYPERTENSION: Chronic | ICD-10-CM

## 2025-08-29 DIAGNOSIS — E11.51 TYPE 2 DIABETES MELLITUS WITH PERIPHERAL VASCULAR DISEASE (HCC): Primary | Chronic | ICD-10-CM

## 2025-08-29 DIAGNOSIS — E78.2 MIXED HYPERLIPIDEMIA: Chronic | ICD-10-CM

## 2025-08-29 PROCEDURE — 3044F HG A1C LEVEL LT 7.0%: CPT | Performed by: NURSE PRACTITIONER

## 2025-08-29 PROCEDURE — 99309 SBSQ NF CARE MODERATE MDM 30: CPT | Performed by: NURSE PRACTITIONER

## 2025-08-29 PROCEDURE — 1123F ACP DISCUSS/DSCN MKR DOCD: CPT | Performed by: NURSE PRACTITIONER

## 2025-08-31 VITALS — SYSTOLIC BLOOD PRESSURE: 140 MMHG | HEART RATE: 72 BPM | DIASTOLIC BLOOD PRESSURE: 83 MMHG | RESPIRATION RATE: 16 BRPM

## 2025-08-31 DIAGNOSIS — R52 PAIN: Primary | ICD-10-CM

## 2025-08-31 DIAGNOSIS — E78.2 MIXED HYPERLIPIDEMIA: ICD-10-CM

## 2025-08-31 DIAGNOSIS — I48.0 PAROXYSMAL ATRIAL FIBRILLATION (HCC): ICD-10-CM

## 2025-08-31 DIAGNOSIS — F51.01 PRIMARY INSOMNIA: ICD-10-CM

## 2025-08-31 RX ORDER — ATORVASTATIN CALCIUM 40 MG/1
40 TABLET, FILM COATED ORAL NIGHTLY
Qty: 30 TABLET | Refills: 0 | Status: SHIPPED | OUTPATIENT
Start: 2025-08-31 | End: 2025-09-30

## 2025-08-31 RX ORDER — LIDOCAINE 4 G/G
1 PATCH TOPICAL DAILY
Qty: 30 PATCH | Refills: 0 | Status: SHIPPED | OUTPATIENT
Start: 2025-08-31 | End: 2025-09-30

## 2025-08-31 RX ORDER — MAGNESIUM OXIDE 400 MG/1
400 TABLET ORAL DAILY
Qty: 30 TABLET | Refills: 0 | Status: SHIPPED | OUTPATIENT
Start: 2025-08-31 | End: 2025-09-30

## 2025-08-31 RX ORDER — TRAZODONE HYDROCHLORIDE 50 MG/1
50 TABLET ORAL NIGHTLY
Qty: 30 TABLET | Refills: 0 | Status: SHIPPED | OUTPATIENT
Start: 2025-08-31 | End: 2025-09-30

## 2025-08-31 RX ORDER — GABAPENTIN 600 MG/1
600 TABLET ORAL DAILY
Qty: 30 TABLET | Refills: 0 | Status: SHIPPED | OUTPATIENT
Start: 2025-08-31 | End: 2025-09-30

## 2025-08-31 RX ORDER — OXYCODONE AND ACETAMINOPHEN 10; 325 MG/1; MG/1
1 TABLET ORAL EVERY 6 HOURS PRN
Qty: 20 TABLET | Refills: 0 | Status: SHIPPED | OUTPATIENT
Start: 2025-08-31 | End: 2025-09-05

## 2025-08-31 RX ORDER — SENNA AND DOCUSATE SODIUM 50; 8.6 MG/1; MG/1
1 TABLET, FILM COATED ORAL DAILY
Qty: 30 TABLET | Refills: 0 | Status: SHIPPED | OUTPATIENT
Start: 2025-08-31 | End: 2025-09-30

## 2025-08-31 RX ORDER — ZINC SULFATE 50(220)MG
220 CAPSULE ORAL DAILY
Qty: 30 CAPSULE | Refills: 0 | Status: SHIPPED | OUTPATIENT
Start: 2025-08-31 | End: 2025-09-30

## 2025-08-31 RX ORDER — VALSARTAN 80 MG/1
80 TABLET ORAL DAILY
Qty: 30 TABLET | Refills: 0 | Status: SHIPPED | OUTPATIENT
Start: 2025-08-31 | End: 2025-09-30

## 2025-08-31 RX ORDER — AMIODARONE HYDROCHLORIDE 200 MG/1
TABLET ORAL
Qty: 30 TABLET | Refills: 0 | Status: SHIPPED | OUTPATIENT
Start: 2025-08-31

## 2025-09-03 ENCOUNTER — OFFICE VISIT (OUTPATIENT)
Facility: CLINIC | Age: 65
End: 2025-09-03

## 2025-09-03 DIAGNOSIS — I10 ESSENTIAL HYPERTENSION: Chronic | ICD-10-CM

## 2025-09-03 DIAGNOSIS — E11.51 TYPE 2 DIABETES MELLITUS WITH PERIPHERAL VASCULAR DISEASE (HCC): Primary | Chronic | ICD-10-CM

## 2025-09-03 DIAGNOSIS — E78.2 MIXED HYPERLIPIDEMIA: Chronic | ICD-10-CM

## 2025-09-03 DIAGNOSIS — I48.0 PAROXYSMAL ATRIAL FIBRILLATION (HCC): Chronic | ICD-10-CM

## 2025-09-03 DIAGNOSIS — Z76.89 ENCOUNTER FOR SOCIAL WORK INTERVENTION: Primary | ICD-10-CM

## 2025-09-03 DIAGNOSIS — M86.9 OSTEOMYELITIS OF LEFT FOOT, UNSPECIFIED TYPE (HCC): ICD-10-CM

## 2025-09-04 PROBLEM — M86.9 OSTEOMYELITIS (HCC): Status: ACTIVE | Noted: 2025-09-04

## 2025-09-05 ENCOUNTER — ANESTHESIA EVENT (OUTPATIENT)
Facility: HOSPITAL | Age: 65
End: 2025-09-05
Payer: MEDICARE

## 2025-09-05 ENCOUNTER — ANESTHESIA (OUTPATIENT)
Facility: HOSPITAL | Age: 65
End: 2025-09-05
Payer: MEDICARE

## 2025-09-05 PROCEDURE — 6360000002 HC RX W HCPCS: Performed by: NURSE ANESTHETIST, CERTIFIED REGISTERED

## 2025-09-05 PROCEDURE — 2580000003 HC RX 258: Performed by: NURSE ANESTHETIST, CERTIFIED REGISTERED

## 2025-09-05 PROCEDURE — 2500000003 HC RX 250 WO HCPCS: Performed by: NURSE ANESTHETIST, CERTIFIED REGISTERED

## 2025-09-05 RX ORDER — SODIUM CHLORIDE, SODIUM LACTATE, POTASSIUM CHLORIDE, CALCIUM CHLORIDE 600; 310; 30; 20 MG/100ML; MG/100ML; MG/100ML; MG/100ML
INJECTION, SOLUTION INTRAVENOUS
Status: DISCONTINUED | OUTPATIENT
Start: 2025-09-05 | End: 2025-09-05 | Stop reason: SDUPTHER

## 2025-09-05 RX ORDER — MIDAZOLAM HYDROCHLORIDE 1 MG/ML
INJECTION, SOLUTION INTRAMUSCULAR; INTRAVENOUS
Status: DISCONTINUED | OUTPATIENT
Start: 2025-09-05 | End: 2025-09-05 | Stop reason: SDUPTHER

## 2025-09-05 RX ORDER — FENTANYL CITRATE 50 UG/ML
INJECTION, SOLUTION INTRAMUSCULAR; INTRAVENOUS
Status: DISCONTINUED | OUTPATIENT
Start: 2025-09-05 | End: 2025-09-05 | Stop reason: SDUPTHER

## 2025-09-05 RX ORDER — LIDOCAINE HYDROCHLORIDE 20 MG/ML
INJECTION, SOLUTION EPIDURAL; INFILTRATION; INTRACAUDAL; PERINEURAL
Status: DISCONTINUED | OUTPATIENT
Start: 2025-09-05 | End: 2025-09-05 | Stop reason: SDUPTHER

## 2025-09-05 RX ORDER — EPHEDRINE SULFATE 50 MG/ML
INJECTION INTRAVENOUS
Status: DISCONTINUED | OUTPATIENT
Start: 2025-09-05 | End: 2025-09-05 | Stop reason: SDUPTHER

## 2025-09-05 RX ORDER — PROPOFOL 10 MG/ML
INJECTION, EMULSION INTRAVENOUS
Status: DISCONTINUED | OUTPATIENT
Start: 2025-09-05 | End: 2025-09-05 | Stop reason: SDUPTHER

## 2025-09-05 RX ORDER — ONDANSETRON 2 MG/ML
INJECTION INTRAMUSCULAR; INTRAVENOUS
Status: DISCONTINUED | OUTPATIENT
Start: 2025-09-05 | End: 2025-09-05 | Stop reason: SDUPTHER

## 2025-09-05 RX ORDER — DEXMEDETOMIDINE HYDROCHLORIDE 100 UG/ML
INJECTION, SOLUTION INTRAVENOUS
Status: DISCONTINUED | OUTPATIENT
Start: 2025-09-05 | End: 2025-09-05 | Stop reason: SDUPTHER

## 2025-09-05 RX ADMIN — SODIUM CHLORIDE, POTASSIUM CHLORIDE, SODIUM LACTATE AND CALCIUM CHLORIDE: 600; 310; 30; 20 INJECTION, SOLUTION INTRAVENOUS at 11:10

## 2025-09-05 RX ADMIN — PROPOFOL 75 MCG/KG/MIN: 10 INJECTION, EMULSION INTRAVENOUS at 11:35

## 2025-09-05 RX ADMIN — DEXMEDETOMIDINE 10 MCG: 100 INJECTION, SOLUTION, CONCENTRATE INTRAVENOUS at 11:40

## 2025-09-05 RX ADMIN — PIPERACILLIN AND TAZOBACTAM 3375 MG: 3; .375 INJECTION, POWDER, LYOPHILIZED, FOR SOLUTION INTRAVENOUS at 11:45

## 2025-09-05 RX ADMIN — LIDOCAINE HYDROCHLORIDE 100 MG: 20 INJECTION, SOLUTION EPIDURAL; INFILTRATION; INTRACAUDAL; PERINEURAL at 11:34

## 2025-09-05 RX ADMIN — DEXMEDETOMIDINE 10 MCG: 100 INJECTION, SOLUTION, CONCENTRATE INTRAVENOUS at 11:34

## 2025-09-05 RX ADMIN — FENTANYL CITRATE 50 MCG: 50 INJECTION INTRAMUSCULAR; INTRAVENOUS at 11:34

## 2025-09-05 RX ADMIN — ONDANSETRON 4 MG: 2 INJECTION, SOLUTION INTRAMUSCULAR; INTRAVENOUS at 12:23

## 2025-09-05 RX ADMIN — MIDAZOLAM 2 MG: 1 INJECTION INTRAMUSCULAR; INTRAVENOUS at 11:30

## 2025-09-05 RX ADMIN — PROPOFOL 50 MG: 10 INJECTION, EMULSION INTRAVENOUS at 11:34

## 2025-09-05 RX ADMIN — EPHEDRINE SULFATE 5 MG: 50 INJECTION INTRAVENOUS at 12:22

## 2025-09-05 RX ADMIN — EPHEDRINE SULFATE 5 MG: 50 INJECTION INTRAVENOUS at 12:11

## (undated) DEVICE — SWAB CULT DBL W/O CHAR RAYON TIP AMIES GEL CLMN FOR COLL

## (undated) DEVICE — CONTAINER,SPECIMEN,3OZ,OR STRL: Brand: MEDLINE

## (undated) DEVICE — BANDAGE,ELASTIC,ESMARK,STERILE,4"X9',LF: Brand: MEDLINE

## (undated) DEVICE — GLOVE SURG SZ 8 L12IN FNGR THK79MIL GRN LTX FREE

## (undated) DEVICE — BLADE,CARBON-STEEL,15,STRL,DISPOSABLE,TB: Brand: MEDLINE

## (undated) DEVICE — SOLUTION WND IRRIGATION 450 ML 0.5 PVP-I 0.9 NACL

## (undated) DEVICE — EXTREMITY - SMH: Brand: MEDLINE INDUSTRIES, INC.

## (undated) DEVICE — DISPOSABLE TOURNIQUET CUFF SINGLE BLADDER, DUAL PORT AND QUICK CONNECT CONNECTOR: Brand: COLOR CUFF

## (undated) DEVICE — BANDAGE,GAUZE,BULKEE II,4.5"X4.1YD,STRL: Brand: MEDLINE

## (undated) DEVICE — GLOVE ORANGE PI 7 1/2   MSG9075

## (undated) DEVICE — SUTURE MONOCRYL + ABSORBABLE MONOFILAMENT 2-0 CT1 12 IN UD SXMP1B412

## (undated) DEVICE — SOLUTION IRRIG 1000ML 09% SOD CHL USP PIC PLAS CONTAINER

## (undated) DEVICE — PENCIL SMK EVAC 10 FT BLADE ELECTRD ROCKER FOR TELSCP

## (undated) DEVICE — HYPODERMIC SAFETY NEEDLE: Brand: MONOJECT

## (undated) DEVICE — PADDING UNDERCAST W3INXL12FT RAYON POLY SYN NONADHESIVE

## (undated) DEVICE — GAUZE,SPONGE,4"X4",12PLY,STRL,LF,10/TRAY: Brand: MEDLINE

## (undated) DEVICE — SOLUTION SURG PREP 26 CC PURPREP

## (undated) DEVICE — PAD,ABDOMINAL,5"X9",ST,LF,25/BX: Brand: MEDLINE

## (undated) DEVICE — SUTURE NONABSORBABLE MONOFILAMENT 2-0 FS 18 IN ETHILON 664H

## (undated) DEVICE — X-RAY DETECTABLE SPONGES,16 PLY: Brand: VISTEC

## (undated) DEVICE — DRESSING,GAUZE,XEROFORM,CURAD,1"X8",ST: Brand: CURAD

## (undated) DEVICE — SKIN PREP TRAY 4 COMPARTM TRAY: Brand: MEDLINE INDUSTRIES, INC.

## (undated) DEVICE — Device